# Patient Record
Sex: MALE
[De-identification: names, ages, dates, MRNs, and addresses within clinical notes are randomized per-mention and may not be internally consistent; named-entity substitution may affect disease eponyms.]

---

## 2017-04-28 ENCOUNTER — HOSPITAL ENCOUNTER (OUTPATIENT)
Dept: HOSPITAL 31 - C.ER | Age: 52
Setting detail: OBSERVATION
LOS: 2 days | Discharge: LEFT BEFORE DISCHARGE | End: 2017-04-30
Payer: MEDICARE

## 2017-04-28 VITALS — BODY MASS INDEX: 20.3 KG/M2

## 2017-04-28 DIAGNOSIS — E11.51: ICD-10-CM

## 2017-04-28 DIAGNOSIS — Z79.4: ICD-10-CM

## 2017-04-28 DIAGNOSIS — Z87.891: ICD-10-CM

## 2017-04-28 DIAGNOSIS — Z89.612: ICD-10-CM

## 2017-04-28 DIAGNOSIS — K50.90: ICD-10-CM

## 2017-04-28 DIAGNOSIS — R10.13: Primary | ICD-10-CM

## 2017-04-28 LAB
ALBUMIN/GLOB SERPL: 1.2 {RATIO} (ref 1–2.1)
ALP SERPL-CCNC: 92 U/L (ref 38–126)
ALT SERPL-CCNC: 14 U/L (ref 21–72)
AST SERPL-CCNC: 23 U/L (ref 17–59)
BASOPHILS # BLD AUTO: 0.1 K/UL (ref 0–0.2)
BASOPHILS NFR BLD: 1.1 % (ref 0–2)
BILIRUB SERPL-MCNC: 0.6 MG/DL (ref 0.2–1.3)
BILIRUB UR-MCNC: NEGATIVE MG/DL
BUN SERPL-MCNC: 45 MG/DL (ref 9–20)
CALCIUM SERPL-MCNC: 9.5 MG/DL (ref 8.6–10.4)
CHLORIDE SERPL-SCNC: 102 MMOL/L (ref 98–107)
CO2 SERPL-SCNC: 19 MMOL/L (ref 22–30)
EOSINOPHIL # BLD AUTO: 0.7 K/UL (ref 0–0.7)
EOSINOPHIL NFR BLD: 8.3 % (ref 0–4)
ERYTHROCYTE [DISTWIDTH] IN BLOOD BY AUTOMATED COUNT: 13.8 % (ref 11.5–14.5)
GLOBULIN SER-MCNC: 3.7 GM/DL (ref 2.2–3.9)
GLUCOSE SERPL-MCNC: 204 MG/DL (ref 75–110)
GLUCOSE UR STRIP-MCNC: (no result) MG/DL
HCT VFR BLD CALC: 29.5 % (ref 35–51)
HYALINE CASTS #/AREA URNS LPF: (no result) /LPF (ref 0–2)
KETONES UR STRIP-MCNC: (no result) MG/DL
LEUKOCYTE ESTERASE UR-ACNC: (no result) LEU/UL
LYMPHOCYTES # BLD AUTO: 2.3 K/UL (ref 1–4.3)
LYMPHOCYTES NFR BLD AUTO: 27.2 % (ref 20–40)
MCH RBC QN AUTO: 30 PG (ref 27–31)
MCHC RBC AUTO-ENTMCNC: 32.8 G/DL (ref 33–37)
MCV RBC AUTO: 91.4 FL (ref 80–94)
MONOCYTES # BLD: 0.4 K/UL (ref 0–0.8)
MONOCYTES NFR BLD: 5.2 % (ref 0–10)
NRBC BLD AUTO-RTO: 0 % (ref 0–2)
PH UR STRIP: 5 [PH] (ref 5–8)
PLATELET # BLD: 293 K/UL (ref 130–400)
PMV BLD AUTO: 8.2 FL (ref 7.2–11.7)
POTASSIUM SERPL-SCNC: 4.8 MMOL/L (ref 3.6–5.2)
PROT SERPL-MCNC: 8.1 G/DL (ref 6.3–8.3)
PROT UR STRIP-MCNC: (no result) MG/DL
RBC # UR STRIP: NEGATIVE /UL
RBC #/AREA URNS HPF: 1 /HPF (ref 0–3)
SODIUM SERPL-SCNC: 139 MMOL/L (ref 132–148)
SP GR UR STRIP: 1.02 (ref 1–1.03)
UROBILINOGEN UR-MCNC: NORMAL MG/DL (ref 0.2–1)
WBC # BLD AUTO: 8.5 K/UL (ref 4.8–10.8)
WBC #/AREA URNS HPF: 2 /HPF (ref 0–5)

## 2017-04-28 PROCEDURE — 82948 REAGENT STRIP/BLOOD GLUCOSE: CPT

## 2017-04-28 PROCEDURE — 96360 HYDRATION IV INFUSION INIT: CPT

## 2017-04-28 PROCEDURE — 76700 US EXAM ABDOM COMPLETE: CPT

## 2017-04-28 PROCEDURE — 85025 COMPLETE CBC W/AUTO DIFF WBC: CPT

## 2017-04-28 PROCEDURE — 83690 ASSAY OF LIPASE: CPT

## 2017-04-28 PROCEDURE — 81001 URINALYSIS AUTO W/SCOPE: CPT

## 2017-04-28 PROCEDURE — 80053 COMPREHEN METABOLIC PANEL: CPT

## 2017-04-28 PROCEDURE — 96374 THER/PROPH/DIAG INJ IV PUSH: CPT

## 2017-04-28 PROCEDURE — 36415 COLL VENOUS BLD VENIPUNCTURE: CPT

## 2017-04-28 PROCEDURE — 74176 CT ABD & PELVIS W/O CONTRAST: CPT

## 2017-04-28 PROCEDURE — 99285 EMERGENCY DEPT VISIT HI MDM: CPT

## 2017-04-28 NOTE — CT
PROCEDURE:  CT Abdomen and Pelvis without intravenous contrast



HISTORY:

cramping abdominal pain



COMPARISON:

None.



TECHNIQUE:

Unenhanced study.  Neither oral nor intravenous contrast 

administered. Sensitivity and specificity for acute inflammatory 

processes limited by the absence of oral and intravenous contrast. 



Radiation dose:



Total exam DLP = 535.58 mGy-cm.



This CT exam was performed using one or more of the following dose 

reduction techniques: Automated exposure control, adjustment of the 

mA and/or kV according to patient size, and/or use of iterative 

reconstruction technique.



FINDINGS:



LOWER THORAX:

Unremarkable. 



LIVER:

Unremarkable. No gross lesion or ductal dilatation.  



GALLBLADDER AND BILE DUCTS:

Unremarkable. 



PANCREAS:

Unremarkable. No gross lesion or ductal dilatation.



SPLEEN:

Unremarkable. 



ADRENALS:

Unremarkable. No mass. 



KIDNEYS AND URETERS:

Unremarkable. No hydronephrosis. No solid mass. 



VASCULATURE:

Unremarkable. No aortic aneurysm. 



BOWEL:

Unremarkable. No obstruction. No gross mural thickening. The 

Constipation without fecal impaction or obstruction. 



APPENDIX:

Unremarkable. Normal appendix. 



PERITONEUM:

Free fluid confined to the pelvis. 



LYMPH NODES:

Unremarkable. No enlarged lymph nodes. 



BLADDER:

Bladder wall thickening either reflective of lack of adequate 

distention or cystitis. No focal bladder abnormalities identified. 



REPRODUCTIVE:

Unremarkable. 



BONES:

No acute fracture. 



OTHER FINDINGS:

None.



IMPRESSION:

Diffuse bladder wall thickening.  Cystitis should be considered.



Trace fluid in the pelvis.

## 2017-04-28 NOTE — C.PDOC
History Of Present Illness





52 y/o male presents to ED with complaint of cramping occasional severe 

abdominal pain for 2 days. Referred by Dr. Brito. Denies history of biliary 

colic. Dr. Brito requests CT scan of abdomen. Denies fever, chills, nausea, 

vomiting, diarrhea, or other associated symptoms.  





Time Seen by Provider: 04/28/17 14:49


Chief Complaint (Nursing): Abdominal Pain


History Per: Patient


History/Exam Limitations: no limitations


Onset/Duration Of Symptoms: Days


Current Symptoms Are (Timing): Still Present


Location Of Pain/Discomfort: RLQ


Radiation Of Pain To:: None


Quality Of Discomfort: Cramping, "Pain"


Associated Symptoms: denies: Fever, Chills, Nausea, Vomiting, Diarrhea, Urinary 

Symptoms


Recent travel outside of the [place]: No





Past Medical History


Reviewed: Historical Data, Nursing Documentation, Vital Signs


Vital Signs: 


 Last Vital Signs











Temp  98.0 F   04/28/17 14:42


 


Pulse  90   04/28/17 14:42


 


Resp  18   04/28/17 14:42


 


BP  110/73   04/28/17 14:42


 


Pulse Ox  98   04/28/17 16:15














- Medical History


PMH: Crohn's Disease, Diabetes, Fractures, HTN





- [facility] Procedures








CLOSED RED-INT FIX FEMUR (06/24/14)


FLUOROSCOPY OF SUPERIOR VENA CAVA, GUIDANCE (10/26/16)


INSERTION OF INFUSION DEV INTO SUP VENA CAVA, PERC APPROACH (10/26/16)








Family History: States: Unknown Family Hx





- Social History


Hx Tobacco Use: Yes


Hx Alcohol Use: No


Hx Substance Use: No





- Immunization History


Hx Tetanus Toxoid Vaccination: No


Hx Influenza Vaccination: No


Hx Pneumococcal Vaccination: No





Review Of Systems


Except As Marked, All Systems Reviewed And Found Negative.


Constitutional: Negative for: Fever, Chills


Cardiovascular: Negative for: Chest Pain


Respiratory: Negative for: Cough


Gastrointestinal: Positive for: Abdominal Pain.  Negative for: Nausea, Vomiting


Skin: Negative for: Rash





Physical Exam





- Physical Exam


Appears: Non-toxic, No Acute Distress


Skin: Warm, Dry


Head: Atraumatic, Normacephalic


Oral Mucosa: Moist


Chest: Symmetrical


Cardiovascular: Rhythm Regular


Respiratory: No Rales, No Rhonchi, No Wheezing


Gastrointestinal/Abdominal: Soft, No Tenderness, Other (dull to percussion)


Back: Normal Inspection, No CVA Tenderness


Extremity: Capillary Refill (< 2 sec. ), Other (Left BKA )


Neurological/Psych: Oriented x3, Normal Speech, Normal Cognition





ED Course And Treatment





- Laboratory Results


Result Diagrams: 


 04/28/17 15:11





 04/28/17 15:11


O2 Sat by Pulse Oximetry: 98 (RA)


Pulse Ox Interpretation: Normal


Progress Note: Treated with Toradol and IVFs. Labs and CT abdomen/pelvis 

ordered.


Reevaluation Time: 17:14


Reassessment Condition: Improved





- Physician Consult Information


Outcome Of Conversation: 1700, 1710; d/w Dr. Brito, ok to Obs





Medical Decision Making


Medical Decision Making: 





mild pancreatitis, lipase 620, normal CT abd





Disposition


Doctor Will See Patient In The: Hospital


Counseled Patient/Family Regarding: Studies Performed, Diagnosis





- Disposition


Disposition: HOSPITALIZED


Disposition Time: 17:15


Condition: GOOD





- Clinical Impression


Clinical Impression: 


 Pancreatitis








- Scribe Statement


The provider has reviewed the documentation as recorded by the Suze Lloyd 





Provider Scribe Attestation:


All medical record entries made by the Edyibkyara were at my direction and 

personally dictated by me. I have reviewed the chart and agree that the record 

accurately reflects my personal performance of the history, physical exam, 

medical decision making, and the department course for this patient. I have 

also personally directed, reviewed, and agree with the discharge instructions 

and disposition.

## 2017-04-29 VITALS — OXYGEN SATURATION: 98 % | RESPIRATION RATE: 20 BRPM

## 2017-04-29 LAB
ALBUMIN/GLOB SERPL: 1.1 {RATIO} (ref 1–2.1)
ALP SERPL-CCNC: 81 U/L (ref 38–126)
ALT SERPL-CCNC: 24 U/L (ref 21–72)
AST SERPL-CCNC: 25 U/L (ref 17–59)
BASOPHILS # BLD AUTO: 0.1 K/UL (ref 0–0.2)
BASOPHILS NFR BLD: 1.3 % (ref 0–2)
BILIRUB SERPL-MCNC: 0.6 MG/DL (ref 0.2–1.3)
BUN SERPL-MCNC: 38 MG/DL (ref 9–20)
CALCIUM SERPL-MCNC: 8.6 MG/DL (ref 8.6–10.4)
CHLORIDE SERPL-SCNC: 108 MMOL/L (ref 98–107)
CO2 SERPL-SCNC: 20 MMOL/L (ref 22–30)
EOSINOPHIL # BLD AUTO: 0.6 K/UL (ref 0–0.7)
EOSINOPHIL NFR BLD: 10.8 % (ref 0–4)
ERYTHROCYTE [DISTWIDTH] IN BLOOD BY AUTOMATED COUNT: 13.6 % (ref 11.5–14.5)
GLOBULIN SER-MCNC: 3.2 GM/DL (ref 2.2–3.9)
GLUCOSE SERPL-MCNC: 158 MG/DL (ref 75–110)
HCT VFR BLD CALC: 28.3 % (ref 35–51)
LYMPHOCYTES # BLD AUTO: 2 K/UL (ref 1–4.3)
LYMPHOCYTES NFR BLD AUTO: 35.9 % (ref 20–40)
MCH RBC QN AUTO: 30.4 PG (ref 27–31)
MCHC RBC AUTO-ENTMCNC: 32.6 G/DL (ref 33–37)
MCV RBC AUTO: 93 FL (ref 80–94)
MONOCYTES # BLD: 0.3 K/UL (ref 0–0.8)
MONOCYTES NFR BLD: 5.5 % (ref 0–10)
NRBC BLD AUTO-RTO: 0 % (ref 0–2)
PLATELET # BLD: 233 K/UL (ref 130–400)
PMV BLD AUTO: 8.4 FL (ref 7.2–11.7)
POTASSIUM SERPL-SCNC: 5.2 MMOL/L (ref 3.6–5.2)
PROT SERPL-MCNC: 6.8 G/DL (ref 6.3–8.3)
SODIUM SERPL-SCNC: 140 MMOL/L (ref 132–148)
WBC # BLD AUTO: 5.5 K/UL (ref 4.8–10.8)

## 2017-04-29 RX ADMIN — INSULIN ASPART SCH: 100 INJECTION, SOLUTION INTRAVENOUS; SUBCUTANEOUS at 08:04

## 2017-04-29 RX ADMIN — INSULIN ASPART SCH UNIT: 100 INJECTION, SOLUTION INTRAVENOUS; SUBCUTANEOUS at 13:34

## 2017-04-29 RX ADMIN — PANTOPRAZOLE SODIUM SCH MG: 40 TABLET, DELAYED RELEASE ORAL at 13:34

## 2017-04-29 RX ADMIN — INSULIN ASPART SCH UNIT: 100 INJECTION, SOLUTION INTRAVENOUS; SUBCUTANEOUS at 22:07

## 2017-04-29 RX ADMIN — INSULIN ASPART SCH UNIT: 100 INJECTION, SOLUTION INTRAVENOUS; SUBCUTANEOUS at 16:19

## 2017-04-29 NOTE — US
HISTORY:

abdominal pain



COMPARISON:

CT abdomen and pelvis without oral or IV contrast performed 4/28/17



TECHNIQUE:

Sonographic evaluation of the abdomen.



FINDINGS:

Examination limited by excessive bowel gas. 



LIVER:

Measures 17.9 cm in sagittal dimension and appears otherwise grossly 

unremarkable. No focal hepatic mass identified. The main portal vein 

appears patent with normal directional flow.   No intrahepatic bile 

duct dilatation.



GALLBLADDER:

No gallstones. No gallbladder wall thickening. Negative sonographic 

Núñez's sign as assessed by the sonographer.



COMMON BILE DUCT:

Measures 6 mm. 



PANCREAS:

Not well visualized.



RIGHT KIDNEY:

Measures 8.4 x 3.8 x 3.5 cm. 1.7 x 1.5 x 2.2 cm hypodensity with 

central 0.8 x 0.4 x 1.1 cm echogenicity; this is not evident on CT 

performed 4/28/17 . No hydronephrosis. 



LEFT KIDNEY:

Measures 10.2 x 5.2 x 4.2 cm. No obstructing calculus or 

hydronephrosis identified. 



SPLEEN:

Measures approximately 9.9 cm. 



AORTA:

Limited views appear unremarkable. 



IVC:

Limited views appear unremarkable. 



OTHER FINDINGS:

None. 



IMPRESSION:

Right-sided 1.7 x 1.5 x 2.2 cm hypo echogenicity with central 0.8 x 

0.4 x 1.1 cm echogenic focus ; this is not evident on CT performed 

4/28/17 and is of dubious significance. Dedicated repeat renal 

ultrasound suggested as this may be artifactual.

## 2017-04-29 NOTE — CP.PCM.CON
Addendum entered and electronically signed by Maye Gutierrez DO  04/29/ 17 11:11: 





colonoscopy over ten years ago- endorsed to be normal





Original Note:








<Maye Gutierrez - Last Filed: 04/29/17 11:06>





History of Present Illness





- History of Present Illness


History of Present Illness: 


Gastroenterology Fellow/PGY4 Consult Note





51 year old male with history of Hypertension, Diabetes on Humalog and Toujeo, 

CKD Stage 3, and left AKA secondary to diabetes complication presenting with 

abdominal pain. Patient describes progressive epigastric discomfort without 

radiation to back for the last two days. Denies nausea, vomiting, diarrhea, 

constipation, heartburn, indigestion, bloating, melena, hematochezia, 

hematemesis, or unintentional weight loss. Denies NSAID use or alcohol use. No 

recent travel, antibiotics, or sick contacts. Notes remote history of 

intermittent abdominal pain throughout adult life. States he was told he may 

have Crohn's disease but never required therapy. Prior colonoscopy over ten 

years ago endorsed to be normal. No prior EGD.





Family-denies liver cancer, colon cancer, stomach cancer


Social-denies tobacco, alcohol, illicit drug use


Surgery- left AKA, , B/L LE vein stripping, multiple stitches





Review of Systems





- Review of Systems


Review of Systems: 


A 12-point review of systems negative except for as above





Past Patient History





- Past Medical History & Family History


Past Medical History?: Yes





- Past Social History


Smoking Status: Former Smoker





- CARDIAC


Hx Hypertension: Yes





- PULMONARY


Hx Respiratory Disorders: No


Other/Comment: QUIT SMOKING X 7 MONTH AGO





- NEUROLOGICAL


Hx Neurological Disorder: No


Other/Comment: AAOX3





- HEENT


Hx HEENT Problems: No


Other/Comment: WEARS GLASSES





- RENAL


Hx Chronic Kidney Disease: No





- ENDOCRINE/METABOLIC


Hx Diabetes Mellitus Type 1: Yes





- HEMATOLOGICAL/ONCOLOGICAL


Hx Blood Disorders: Yes


Hx Blood Transfusions: Yes


Hx Blood Transfusion Reaction: No





- INTEGUMENTARY


Hx Dermatological Problems: No





- MUSCULOSKELETAL/RHEUMATOLOGICAL


Hx Falls: Yes (last fall 04/26/2017)


Hx Fractures: Yes (Broke hip 2014 or 2015)





- GASTROINTESTINAL


Hx Crohn's Disease: Yes





- GENITOURINARY/GYNECOLOGICAL


Hx Genitourinary Disorders: No





- PSYCHIATRIC


Hx Depression: Yes


Hx Substance Use: No





- SURGICAL HISTORY


Hx Surgeries: Yes


Hx Amputation: Yes (left BKA)





- ANESTHESIA


Hx Anesthesia: Yes


Hx Anesthesia Reactions: No


Hx Malignant Hyperthermia: No


Has any member of the family had a problem w/ anesthesia?: No





Meds


Allergies/Adverse Reactions: 


 Allergies











Allergy/AdvReac Type Severity Reaction Status Date / Time


 


acetaminophen [From Percocet] Allergy   Verified 10/26/16 10:52


 


oxycodone HCl [From Percocet] Allergy   Verified 10/26/16 10:52


 


Penicillins Allergy   Verified 10/26/16 10:52














- Medications


Medications: 


 Current Medications





Sodium Chloride (Sodium Chloride 0.9%)  1,000 mls @ 100 mls/hr IV .Q10H [facility]


   Last Admin: 04/29/17 04:13 Dose:  100 mls/hr


Insulin Aspart (Novolog)  0 unit SC ACHS [facility]


   PRN Reason: Protocol


   Last Admin: 04/29/17 08:04 Dose:  Not Given


Morphine Sulfate (Morphine)  2 mg IVP Q6 PRN


   PRN Reason: Pain, moderate (4-7)


   Last Admin: 04/29/17 08:18 Dose:  2 mg











Physical Exam





- Constitutional


Appears: Non-toxic, No Acute Distress





- Head Exam


Head Exam: ATRAUMATIC, NORMOCEPHALIC





- Eye Exam


Eye Exam: EOMI, PERRL





- ENT Exam


ENT Exam: Mucous Membranes Moist, Normal Oropharynx





- Neck Exam


Neck exam: Positive for: Full Rom, Normal Inspection





- Respiratory Exam


Respiratory Exam: Clear to Auscultation Bilateral.  absent: Rales, Rhonchi, 

Wheezes





- Cardiovascular Exam


Cardiovascular Exam: RRR, +S1, +S2.  absent: Gallop, Rubs





- GI/Abdominal Exam


GI & Abdominal Exam: Normal Bowel Sounds, Soft, Tenderness.  absent: Distended, 

Firm, Guarding, Organomegaly, Rebound, Rigid


Additional comments: 


epigastric tenderness to palpation 








- Extremities Exam


Extremities exam: Positive for: full ROM.  Negative for: pedal edema





- Neurological Exam


Neurological exam: Alert





- Psychiatric Exam


Psychiatric exam: Normal Affect, Normal Mood





- Skin


Skin Exam: Dry, Intact, Normal Color, Warm





Results





- Vital Signs


Recent Vital Signs: 


 Last Vital Signs











Temp  97.4 F L  04/29/17 07:44


 


Pulse  66   04/29/17 07:44


 


Resp  20   04/29/17 07:44


 


BP  113/69   04/29/17 07:44


 


Pulse Ox  98   04/29/17 07:44














- Labs


Result Diagrams: 


 04/28/17 15:11





 04/28/17 15:11


Labs: 


 Laboratory Results - last 24 hr











  04/29/17





  07:02


 


POC Glucose (mg/dL)  152 H














Assessment & Plan





- Assessment and Plan (Free Text)


Assessment: 


51 year old male with history of Hypertension, Diabetes on Humalog and Toujeo, 

and left AKA secondary to diabetes complication presenting with epigastric 

pain. CT A/P without acute pancreatic findings and lipase 620.  No prior EGD or 

colonoscopy.





Plan: 





>DDX: acute mild pancreatitis, Gastritis, cholelithiasis


>aggressive IVFs-bolus 1L LR


>maintenance /hr


>ordered CBC, CMP


>ordered Lipid panel


>ordered IgG4  to evaluate for autoimmune pancreatitis


>denies alcohol use, no signs of inciting drugs


>monitor for improvement in BUN/Cr


>pending Ultrasound


>evaluate for gallstone pancreatitis


>continue clear liquids


>supportive care: pain control, antiemetics


>further recommendations based on clinical course





<Mercedes Frankel - Last Filed: 04/29/17 11:29>





Meds





- Medications


Medications: 


 Current Medications





Sodium Chloride (Sodium Chloride 0.9%)  1,000 mls @ 100 mls/hr IV .Q10H [facility]


   Last Admin: 04/29/17 04:13 Dose:  100 mls/hr


Insulin Aspart (Novolog)  0 unit SC ACHS [facility]


   PRN Reason: Protocol


   Last Admin: 04/29/17 08:04 Dose:  Not Given


Morphine Sulfate (Morphine)  2 mg IVP Q6 PRN


   PRN Reason: Pain, moderate (4-7)


   Last Admin: 04/29/17 08:18 Dose:  2 mg











Results





- Vital Signs


Recent Vital Signs: 


 Last Vital Signs











Temp  97.4 F L  04/29/17 07:44


 


Pulse  66   04/29/17 07:44


 


Resp  20   04/29/17 07:44


 


BP  113/69   04/29/17 07:44


 


Pulse Ox  98   04/29/17 07:44














- Labs


Result Diagrams: 


 04/28/17 15:11





 04/28/17 15:11


Labs: 


 Laboratory Results - last 24 hr











  04/29/17





  07:02


 


POC Glucose (mg/dL)  152 H














Attending/Attestation





- Attestation


I have personally seen and examined this patient.: Yes


I have fully participated in the care of the patient.: Yes


I have reviewed all pertinent clinical information: Yes


Notes (Text): 


Patient seen and examined with GI fellow.  Agree with her note as documented 

above with the following additions/exceptions.  This is a 51 year old male with 

history of HTN, insulin dependent diabetes, s/p L AKA who is admitted with 

sudden onset abdominal pain.  He is found to have mildly elevated lipase with 

normal CT, consistent with chemical pancreatitis.  There is no associated nausea

/vomiting or diarrhea.He denies ETOH use.  He reports being told he may have 

Crohn's disease in past due to food intolerances, although never formally 

diagnosed and has never been on therapy.  He states that his abdominal pain is 

overall improved today.  Recommend continued supportive care, IVF hydration, 

pain control as needed.  Follow up repeat CMP.  Add PPI therapy.  Abdominal 

ultrasound pending.  Would advance diet slowly as tolerated.  He would benefit 

from outpatient colonoscopy for screening purposes.





04/29/17 11:21

## 2017-04-30 VITALS — TEMPERATURE: 97.9 F | DIASTOLIC BLOOD PRESSURE: 77 MMHG | SYSTOLIC BLOOD PRESSURE: 135 MMHG | HEART RATE: 73 BPM

## 2017-04-30 RX ADMIN — INSULIN ASPART SCH: 100 INJECTION, SOLUTION INTRAVENOUS; SUBCUTANEOUS at 11:40

## 2017-04-30 RX ADMIN — INSULIN ASPART SCH UNIT: 100 INJECTION, SOLUTION INTRAVENOUS; SUBCUTANEOUS at 08:35

## 2017-04-30 RX ADMIN — PANTOPRAZOLE SODIUM SCH MG: 40 TABLET, DELAYED RELEASE ORAL at 10:33

## 2017-04-30 NOTE — CP.PCM.PN
<Maye Gutierrez - Last Filed: 04/30/17 08:55>





Subjective





- Date & Time of Evaluation


Date of Evaluation: 04/30/17


Time of Evaluation: 07:12





- Subjective


Subjective: 


Gastroenterology Fellow/PGY4 Progress Note





Patient notes improved abdominal pain. Tolerating regular diet. Notes two bowel 

movements since yesterday morning. A 12-point review of systems negative except 

for as above.











Objective





- Vital Signs/Intake and Output


Vital Signs (last 24 hours): 


 











Temp Pulse Resp BP Pulse Ox


 


 97.8 F   78   20   135/80   98 


 


 04/29/17 23:40  04/29/17 23:40  04/29/17 23:40  04/29/17 23:40  04/29/17 23:40








Intake and Output: 


 











 04/30/17 04/30/17





 06:59 18:59


 


Intake Total 790 


 


Output Total 200 


 


Balance 590 














- Medications


Medications: 


 Current Medications





Sodium Chloride (Sodium Chloride 0.9%)  1,000 mls @ 100 mls/hr IV .Q10H [facility]


   Last Admin: 04/30/17 06:06 Dose:  Not Given


Insulin Aspart (Novolog)  0 unit SC ACHS [facility]


   PRN Reason: Protocol


   Last Admin: 04/29/17 22:07 Dose:  2 unit


Morphine Sulfate (Morphine)  2 mg IVP Q6 PRN


   PRN Reason: Pain, moderate (4-7)


   Last Admin: 04/29/17 08:18 Dose:  2 mg


Pantoprazole Sodium (Protonix Ec Tab)  40 mg PO DAILY [facility]


   Last Admin: 04/29/17 13:34 Dose:  40 mg











- Labs


Labs: 


 





 04/29/17 11:55 





 04/29/17 11:55 











- Constitutional


Appears: Non-toxic, No Acute Distress





- Head Exam


Head Exam: ATRAUMATIC, NORMOCEPHALIC





- Eye Exam


Eye Exam: EOMI, PERRL


Pupil Exam: PERRL.  absent: Miosis, Mydriatic





- ENT Exam


ENT Exam: Mucous Membranes Moist, Normal Oropharynx





- Neck Exam


Neck Exam: Full ROM, Normal Inspection





- Respiratory Exam


Respiratory Exam: Clear to Ausculation Bilateral.  absent: Rales, Rhonchi, 

Wheezes





- Cardiovascular Exam


Cardiovascular Exam: RRR, +S1, +S2.  absent: Gallop, Rubs





- GI/Abdominal Exam


GI & Abdominal Exam: Soft, Normal Bowel Sounds.  absent: Distended, Firm, 

Guarding, Rigid, Tenderness, Organomegaly, Rebound





- Extremities Exam


Extremities Exam: Full ROM.  absent: Pedal Edema





- Neurological Exam


Neurological Exam: Alert, Awake





- Psychiatric Exam


Psychiatric exam: Normal Affect, Normal Mood





- Skin


Skin Exam: Dry, Intact, Normal Color, Warm





Assessment and Plan





- Assessment and Plan (Free Text)


Assessment: 


51 year old male with history of Hypertension, Diabetes on Humalog and Toujeo, 

and left AKA presenting with epigastric pain. CT A/P without acute pancreatic 

changes and lipase 620.  No prior EGD or colonoscopy.








Plan: 





>likely chemical pancreatitis, Gastritis


>Ultrasound- no gallstones


>symptoms resolved


>regular diet


>clear for discharge from GI standpoint


>outpatient follow up with Dr. Frankel for elective colonoscopy for CRC 

screening





<Mercedes Frankel - Last Filed: 04/30/17 12:42>





Objective





- Vital Signs/Intake and Output


Vital Signs (last 24 hours): 


 











Temp Pulse Resp BP Pulse Ox


 


 97.9 F   73   20   135/77   98 


 


 04/30/17 08:08  04/30/17 08:08  04/30/17 08:08  04/30/17 08:08  04/30/17 08:08








Intake and Output: 


 











 04/30/17 04/30/17





 06:59 18:59


 


Intake Total 790 


 


Output Total 200 


 


Balance 590 














- Medications


Medications: 


 Current Medications





Sodium Chloride (Sodium Chloride 0.9%)  1,000 mls @ 100 mls/hr IV .Q10H [facility]


   Last Admin: 04/30/17 06:06 Dose:  Not Given


Insulin Aspart (Novolog)  0 unit SC ACHS [facility]


   PRN Reason: Protocol


   Last Admin: 04/30/17 11:40 Dose:  Not Given


Morphine Sulfate (Morphine)  2 mg IVP Q6 PRN


   PRN Reason: Pain, moderate (4-7)


   Last Admin: 04/29/17 08:18 Dose:  2 mg


Pantoprazole Sodium (Protonix Ec Tab)  40 mg PO DAILY [facility]


   Last Admin: 04/30/17 10:33 Dose:  40 mg











- Labs


Labs: 


 





 04/29/17 11:55 





 04/29/17 11:55 











Attending/Attestation





- Attestation


I have personally seen and examined this patient.: Yes


I have fully participated in the care of the patient.: Yes


I have reviewed all pertinent clinical information, including history, physical 

exam and plan: Yes


Notes (Text): 


Patient seen and examined with GI fellow.  Agree with her note as documented 

above with the following additions/exceptions.  





This is a 51 year old male with history of HTN, insulin dependent diabetes, s/p 

L AKA who is admitted with sudden onset abdominal pain.  He is found to have 

mildly elevated lipase with normal CT, consistent with chemical pancreatitis.  

Abd sonogram without evidence of hepatobiliary pathology.  He symptoms are 

resolved, no further abdominal pain and he is tolerating PO without difficulty.

  No further inpatient GI work up at this time.  He can follow up with me as 

outpatient (will need colonoscopy for CRC screening).  Office information was 

provided to the patient.  Please call with any further questions or concerns.





04/30/17 12:41

## 2017-05-01 NOTE — DS
REASON FOR ADMISSION:  This is a 51-year-old  male with history of 
multiple medical problems, was admitted for abdominal pain and found to have 
pancreatitis.



COURSE OF HOSPITALIZATION:  The patient was admitted to medical floor and 
thought to have alcohol-related pancreatitis.  The patient's abdominal 
ultrasound did not show any cholelithiasis. The patient was kept on IV steroid 
and initially n.p.o.  The patient had a GI consultation.  The patient's 
ultrasound showed that there is grossly unremarkable liver and gallbladder.  
The patient's diet was advanced and patient was tolerating well, but he signed 
against medical advice to continue hydration and monitor the patient on diet.



FINAL DIAGNOSES:

1.  Acute pancreatitis, alcohol-related

2.  Peripheral vascular disease with left below knee amputation.

3.  Hypertension.





__________________________________________

[facility] JOSH Brito MD







cc:   



DD: 05/01/2017 21:00:24  [id number]

TT: 05/01/2017 22:43:29

Job # [id number]

[email or web address] HUNT

## 2017-05-22 ENCOUNTER — HOSPITAL ENCOUNTER (OUTPATIENT)
Dept: HOSPITAL 31 - C.ENDO | Age: 52
Discharge: HOME | End: 2017-05-22
Attending: INTERNAL MEDICINE
Payer: MEDICARE

## 2017-05-22 VITALS — OXYGEN SATURATION: 97 %

## 2017-05-22 VITALS — DIASTOLIC BLOOD PRESSURE: 75 MMHG | SYSTOLIC BLOOD PRESSURE: 122 MMHG | HEART RATE: 72 BPM

## 2017-05-22 VITALS — RESPIRATION RATE: 12 BRPM

## 2017-05-22 VITALS — TEMPERATURE: 98.6 F

## 2017-05-22 VITALS — BODY MASS INDEX: 20.7 KG/M2

## 2017-05-22 DIAGNOSIS — K20.8: Primary | ICD-10-CM

## 2017-05-22 DIAGNOSIS — K29.80: ICD-10-CM

## 2017-05-22 DIAGNOSIS — K64.8: ICD-10-CM

## 2017-05-22 DIAGNOSIS — Z12.11: ICD-10-CM

## 2017-05-22 DIAGNOSIS — K29.50: ICD-10-CM

## 2017-05-22 DIAGNOSIS — R10.84: ICD-10-CM

## 2017-05-22 PROCEDURE — 88313 SPECIAL STAINS GROUP 2: CPT

## 2017-05-22 PROCEDURE — 88342 IMHCHEM/IMCYTCHM 1ST ANTB: CPT

## 2017-05-22 PROCEDURE — 82948 REAGENT STRIP/BLOOD GLUCOSE: CPT

## 2017-05-22 PROCEDURE — 43239 EGD BIOPSY SINGLE/MULTIPLE: CPT

## 2017-05-22 PROCEDURE — 88305 TISSUE EXAM BY PATHOLOGIST: CPT

## 2017-05-22 PROCEDURE — 45378 DIAGNOSTIC COLONOSCOPY: CPT

## 2017-07-24 ENCOUNTER — HOSPITAL ENCOUNTER (EMERGENCY)
Dept: HOSPITAL 31 - C.ER | Age: 52
Discharge: HOME | End: 2017-07-24
Payer: MEDICARE

## 2017-07-24 VITALS — HEART RATE: 76 BPM | RESPIRATION RATE: 16 BRPM | SYSTOLIC BLOOD PRESSURE: 100 MMHG | DIASTOLIC BLOOD PRESSURE: 68 MMHG

## 2017-07-24 VITALS — BODY MASS INDEX: 20.7 KG/M2

## 2017-07-24 VITALS — TEMPERATURE: 98 F

## 2017-07-24 VITALS — OXYGEN SATURATION: 100 %

## 2017-07-24 DIAGNOSIS — M79.605: Primary | ICD-10-CM
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
## 2017-07-24 NOTE — RAD
Left knee three views 



History: Pain. Below-knee amputation. 



Comparison: None available. 



Findings: 



No evidence of acute displaced fracture or dislocation. 



Mild productive change and or heterotopic bone at the surgical sites 

of the proximal tibia and fibula. 



Impression: 



No evidence of acute displaced fracture or dislocation. 



Mild productive change and or heterotopic bone at the surgical sites 

of the proximal tibia and fibula. 



If pain persists, consider MRI.

## 2017-08-25 ENCOUNTER — HOSPITAL ENCOUNTER (INPATIENT)
Dept: HOSPITAL 31 - C.ER | Age: 52
LOS: 4 days | Discharge: HOME | DRG: 638 | End: 2017-08-29
Payer: MEDICARE

## 2017-08-25 VITALS — BODY MASS INDEX: 21.4 KG/M2

## 2017-08-25 DIAGNOSIS — E10.21: ICD-10-CM

## 2017-08-25 DIAGNOSIS — E10.69: Primary | ICD-10-CM

## 2017-08-25 DIAGNOSIS — I70.209: ICD-10-CM

## 2017-08-25 DIAGNOSIS — Z87.891: ICD-10-CM

## 2017-08-25 DIAGNOSIS — M86.9: ICD-10-CM

## 2017-08-25 DIAGNOSIS — F32.9: ICD-10-CM

## 2017-08-25 DIAGNOSIS — N18.3: ICD-10-CM

## 2017-08-25 DIAGNOSIS — L03.031: ICD-10-CM

## 2017-08-25 DIAGNOSIS — L97.519: ICD-10-CM

## 2017-08-25 DIAGNOSIS — K50.90: ICD-10-CM

## 2017-08-25 DIAGNOSIS — E10.22: ICD-10-CM

## 2017-08-25 DIAGNOSIS — E10.621: ICD-10-CM

## 2017-08-25 DIAGNOSIS — Z79.4: ICD-10-CM

## 2017-08-25 DIAGNOSIS — I12.9: ICD-10-CM

## 2017-08-25 LAB
BUN SERPL-MCNC: 36 MG/DL (ref 9–20)
CALCIUM SERPL-MCNC: 9 MG/DL (ref 8.6–10.4)
CHLORIDE SERPL-SCNC: 95 MMOL/L (ref 98–107)
CO2 SERPL-SCNC: 22 MMOL/L (ref 22–30)
GLUCOSE SERPL-MCNC: 582 MG/DL (ref 75–110)
POTASSIUM SERPL-SCNC: 4.9 MMOL/L (ref 3.6–5.2)
SODIUM SERPL-SCNC: 129 MMOL/L (ref 132–148)

## 2017-08-25 RX ADMIN — INSULIN ASPART SCH UNIT: 100 INJECTION, SOLUTION INTRAVENOUS; SUBCUTANEOUS at 22:00

## 2017-08-25 NOTE — RAD
PROCEDURE:  CHEST RADIOGRAPH, 1 VIEW



HISTORY:

admission, PICC confirmation



COMPARISON:

08/25/2017 at 10:45 a.m.



FINDINGS:

The right PICC line terminates at the cavoatrial junction. 



LUNGS:

The lungs are well inflated and clear.



PLEURA:

No pneumothorax or pleural fluid seen.



CARDIOVASCULAR:

Normal.



OSSEOUS STRUCTURES:

No significant abnormalities.



VISUALIZED UPPER ABDOMEN:

Normal.



OTHER FINDINGS:

None. 



IMPRESSION:

Right PICC line terminates at the cavoatrial junction. No acute 

findings.

## 2017-08-25 NOTE — CP.PCM.CON
History of Present Illness





- History of Present Illness


History of Present Illness: 





iv cubicin ordered empirically


hx dm   left bka  here for right foot infection


allergic to pcn





Review of Systems





- Constitutional


Constitutional: As Per HPI





- EENT


Eyes: absent: As Per HPI, Blind Spots, Blurred Vision, Change in Vision, 

Decreased Night Vision, Diplopia, Discharge, Dry Eye, Exophthalmos, Floaters, 

Irritation, Itchy Eyes, Loss of Peripheral Vision, Pain, Photophobia, Requires 

Corrective Lenses, Sees Flashes, Spots in Vision, Tunnel Vision, Other Visual 

Disturbances, Loss of Vision, Other


Ears: absent: As Per HPI, Decreased Hearing, Ear Discharge, Ear Pain, Tinnitus, 

Abnormal Hearing, Disequilibrium, Dizziness, Other


Nose/Mouth/Throat: absent: As Per HPI, Epistaxis, Nasal Congestion, Nasal 

Discharge, Nasal Obstruction, Nasal Trauma, Nose Pain, Post Nasal Drip, Sinus 

Pain, Sinus Pressure, Bleeding Gums, Change in Voice, Dental Pain, Dry Mouth, 

Dysphagia, Halitosis, Hoarsness, Lip Swelling, Mouth Lesions, Mouth Pain, 

Odynophagia, Sore Throat, Throat Swelling, Tongue Swelling, Facial Pain, Neck 

Pain, Neck Mass, Other





- Cardiovascular


Cardiovascular: absent: As Per HPI, Acrocyanosis, Chest Pain, Chest Pain at Rest

, Chest Pain with Activity, Claudication, Diaphoresis, Dyspnea, Dyspnea on 

Exertion, Edema, Irregular Heart Rhythm, Pain Radiating to Arm/Neck/Jaw, Leg 

Edema, Leg Ulcers, Lightheadedness, Orthopnea, Palpitations, Paroxysmal 

Nocturnal Dyspnea, Pedal Edema, Radiating Pain, Rapid Heart Rate, Slow Heart 

Rate, Syncope, Other





- Respiratory


Respiratory: absent: As Per HPI, Cough, Dyspnea, Hemoptysis, Dyspnea on Exertion

, Wheezing, Snoring, Stridor, Pain on Inspiration, Chest Congestion, Excessive 

Mucous Production, Change in Mucous Color, Pain with Coughing, Other





- Gastrointestinal


Gastrointestinal: absent: As Per HPI, Abdominal Pain, Belching, Bloating, 

Change in Bowel Habits, Change in Stool Character, Coffee Ground Emesis, 

Constipation, Cramping, Diarrhea, Dyspepsia, Dysphagia, Early Satiety, 

Excessive Flatus, Fecal Incontinence, Heartburn, Hematemesis, Hematochezia, 

Loose Stools, Melena, Nausea, Odynophagia, Temesmus, Vomiting, Other





- Genitourinary


Genitourinary: absent: As Per HPI, Change in Urinary Stream, Difficulty 

Urinating, Dysuria, Flank Pain, Hematuria, Pyuria, Nocturia, Urinary 

Incontinence, Urinary Frequency, Urinary Hesitance, Urinary Urgency, Voiding 

Freq/Small Amts, Freq UTI, Hx Renal/Bladder Calculi, Hx /Renal Surgery, 

Bladder Distension, Other





- Musculoskeletal


Musculoskeletal: As Per HPI





- Integumentary


Integumentary: As Per HPI





Past Patient History





- Past Medical History & Family History


Past Medical History?: Yes





- Past Social History


Smoking Status: Former Smoker





- CARDIAC


Hx Hypertension: No (PT DENIES)





- PULMONARY


Hx Respiratory Disorders: No





- NEUROLOGICAL


Hx Neurological Disorder: No





- HEENT


Hx HEENT Problems: No





- RENAL


Hx Chronic Kidney Disease: No





- ENDOCRINE/METABOLIC


Hx Endocrine Disorders: Yes


Hx Diabetes Mellitus Type 2: Yes





- HEMATOLOGICAL/ONCOLOGICAL


Hx Anemia: Yes





- INTEGUMENTARY


Hx Dermatological Problems: No





- MUSCULOSKELETAL/RHEUMATOLOGICAL


Hx Fractures: Yes (Broke hip 2014 or 2015)





- GASTROINTESTINAL


Hx Crohn's Disease: Yes





- GENITOURINARY/GYNECOLOGICAL


Hx Genitourinary Disorders: No





- PSYCHIATRIC


Hx Depression: Yes


Hx Substance Use: No





- SURGICAL HISTORY


Hx Surgeries: Yes


Hx Amputation: Yes (LEFT AKA)


Hx Orthopedic Surgery: Yes (LEFT FOOT X 6-8)


Other/Comment: BILATERAL VEIN STRIPPING AND LIGATION





- ANESTHESIA


Hx Anesthesia: Yes


Hx Anesthesia Reactions: No


Hx Malignant Hyperthermia: No





Meds


Home Medications: 


 Home Medication List











 Medication  Instructions  Recorded  Confirmed  Type


 


DAPTOmycin [Cubicin] 340 mg IV Q24H #42 vial 08/29/17  Rx


 


Pantoprazole [Protonix] 40 mg PO DAILY #30 ect 08/29/17  Rx











Allergies/Adverse Reactions: 


 Allergies











Allergy/AdvReac Type Severity Reaction Status Date / Time


 


acetaminophen [From Percocet] Allergy   Verified 08/25/17 16:53


 


oxycodone HCl [From Percocet] Allergy   Verified 08/25/17 16:53


 


Penicillins Allergy   Verified 08/25/17 16:53














- Medications


Medications: 


 Current Medications





Aspirin (Aspirin Chewable)  81 mg PO DAILY [facility]


Daptomycin (Cubicin)  340 mg 4 mg/kg (340 mg) IV Q24H [facility]


   Stop: 08/30/17 19:46


Enalapril Maleate (Vasotec)  2.5 mg PO DAILY [facility]


Gabapentin (Neurontin)  100 mg PO DAILY [facility]


Home Med (L.Acidoph,Paracasei, B.Lactis [Probiotic])  1 each PO DAILY [facility]


Home Med (Multivitamin [Multivitamins])  1 each PO DAILY [facility]


Insulin Aspart (Novolog)  0 unit SC ACHS [facility]


   PRN Reason: Protocol


Insulin Glargine (Lantus)  16 unit SC BIDAC [facility]


Pantoprazole Sodium (Protonix Ec Tab)  40 mg PO DAILY [facility]


Sertraline HCl (Zoloft)  100 mg PO DAILY [facility]











Physical Exam





- Constitutional


Appears: Non-toxic, Chronically Ill





- Head Exam


Head Exam: NORMOCEPHALIC





- Eye Exam


Eye Exam: PERRL





- ENT Exam


ENT Exam: Mucous Membranes Dry, Normal External Ear Exam





- Neck Exam


Neck exam: Negative for: Lymphadenopathy





- Respiratory Exam


Respiratory Exam: Decreased Breath Sounds





- Cardiovascular Exam


Cardiovascular Exam: REGULAR RHYTHM





- GI/Abdominal Exam


GI & Abdominal Exam: Diminished Bowel Sounds





- Rectal Exam


Rectal Exam: Deferred





-  Exam


 Exam: NORMAL INSPECTION





- Extremities Exam


Extremities exam: Positive for: pedal edema, tenderness, pedal pulses present.  

Negative for: calf tenderness





- Back Exam


Back exam: absent: CVA tenderness (L), CVA tenderness (R)





- Neurological Exam


Neurological exam: Alert, CN II-XII Intact, Oriented x3, Reflexes Normal





- Psychiatric Exam


Psychiatric exam: Normal Mood





- Skin


Skin Exam: Dry





Results





- Vital Signs


Recent Vital Signs: 


 Last Vital Signs











Temp  98.6 F   08/25/17 16:53


 


Pulse  87   08/25/17 16:53


 


Resp  18   08/25/17 16:53


 


BP  131/79   08/25/17 16:53


 


Pulse Ox  98   08/25/17 19:43














- Labs


Result Diagrams: 


 08/29/17 11:34





 08/29/17 11:34





Assessment & Plan


(1) Cellulitis


Status: Acute   





(2) Crohn's disease


Status: Acute   





(3) Diabetes


Status: Acute   





(4) Diabetic ulcer of foot associated with diabetes mellitus due to underlying 

condition, limited to breakdown of skin


Status: Acute   





(5) History of left below knee amputation


Status: Acute   





(6) Leg pain


Status: Acute   





(7) Osteomyelitis of ankle or foot


Status: Acute   





(8) Uncontrolled diabetes mellitus


Status: Acute   





- Assessment and Plan (Free Text)


Assessment: 





cont iv antibotics and wound care

## 2017-08-25 NOTE — C.PDOC
History Of Present Illness


50 y/o male sent to ED by Dr. Maza for admission for osteomyelitis of right 

2nd toe. Pt had a PICC line inserted today, states he was supposed to get 

Avelox today but was that medication is back ordered.  Pt had blood work done 

today which showed elevated creatinine level.   He denies pain, fever, chills, 

falls/ injury.


Time Seen by Provider: 08/25/17 17:15


Chief Complaint (Nursing): Abnormal Labs


History Per: Patient


History/Exam Limitations: no limitations


Onset/Duration Of Symptoms: Days


Current Symptoms Are (Timing): Still Present


Recent travel outside of the [place]: No


Additional History Per: Patient





Past Medical History


Reviewed: Historical Data, Nursing Documentation, Vital Signs


Vital Signs: 


 Last Vital Signs











Temp  98.1 F   08/29/17 15:05


 


Pulse  78   08/29/17 15:05


 


Resp  20   08/29/17 15:05


 


BP  132/79   08/29/17 15:05


 


Pulse Ox  98   08/29/17 15:05














- Medical History


PMH: Anemia, Crohn's Disease, Depression, Diabetes, Fractures (Broke hip 2014 

or 2015)





- [facility] Procedures








CLOSED RED-INT FIX FEMUR (06/24/14)


FLUOROSCOPY OF SUPERIOR VENA CAVA, GUIDANCE (10/26/16)


INSERTION OF INFUSION DEV INTO SUP VENA CAVA, PERC APPROACH (10/26/16)








Family History: States: No Known Family Hx





- Social History


Hx Tobacco Use: Yes


Hx Alcohol Use: No


Hx Substance Use: No





- Immunization History


Hx Tetanus Toxoid Vaccination: No


Hx Influenza Vaccination: No


Hx Pneumococcal Vaccination: No





Review Of Systems


Except As Marked, All Systems Reviewed And Found Negative.


Constitutional: Negative for: Fever, Chills


Cardiovascular: Negative for: Chest Pain, Palpitations


Respiratory: Negative for: Shortness of Breath


Gastrointestinal: Negative for: Nausea, Vomiting, Abdominal Pain


Musculoskeletal: Positive for: Other (redness and swelling to right 2nd toe).  

Negative for: Foot Pain


Neurological: Negative for: Weakness, Numbness





Physical Exam





- Physical Exam


Appears: Well, Non-toxic, No Acute Distress


Skin: Warm, Dry, Other (mild erythema to plantar aspect of right 2nd toe)


Eye(s): bilateral: Normal Inspection


Oral Mucosa: Moist


Neck: Supple


Cardiovascular: Rhythm Regular


Respiratory: Normal Breath Sounds, No Rales, No Rhonchi, No Wheezing


Gastrointestinal/Abdominal: Normal Exam, Bowel Sounds, Soft, No Tenderness


Extremity: Normal ROM, No Tenderness, Capillary Refill (<2 sec all digits ), 

Swelling (right 2nd toe), Other (1cm of open wound to plantar aspect of right 

2nd toe with surrounding swelling; left BKA)


Pulses: Left Dorsalis Pedis: Normal, Right Dorsalis Pedis: Normal


Neurological/Psych: Oriented x3, Normal Motor, No Normal Sensation (decreased 

sensation to right foot)





ED Course And Treatment





- Laboratory Results


Result Diagrams: 


 08/29/17 11:34





 08/29/17 11:34


O2 Sat by Pulse Oximetry: 98 (on RA)


Pulse Ox Interpretation: Normal


Progress Note: Plan: Blood work, CXR ordered and reviewed.  Patient given IV NS 

bolus, IV insulin.  Spoke with Dr. Maza requests I wait to give antibiotics 

until ID Dr. Trinidad consult.  He states he did not want to give outpatient IV 

Vanco due to elevated Bun/Cr.





- Physician Consult Information


Physician Contacted: Liliane Brito


Outcome Of Conversation: Discussed patient with Dr. Brito, agrees with 

admission for osteomyelitis.





Disposition





- Disposition


Disposition: HOSPITALIZED


Disposition Time: 18:14


Condition: STABLE





- Clinical Impression


Clinical Impression: 


 Uncontrolled diabetes mellitus, Renal failure, Osteomyelitis








- Scribe Statement


The provider has reviewed the documentation as recorded by the Scribe





Anabella Gary





All medical record entries made by the Edyibkyara were at my direction and 

personally dictated by me. I have reviewed the chart and agree that the record 

accurately reflects my personal performance of the history, physical exam, 

medical decision making, and the department course for this patient. I have 

also personally directed, reviewed, and agree with the discharge instructions 

and disposition.





Decision To Admit





- .


Admitting Physician: Liliane Brito


Patient Diagnosis: 


 Renal failure, Osteomyelitis, Uncontrolled diabetes mellitus

## 2017-08-26 LAB
ALBUMIN/GLOB SERPL: 1 {RATIO} (ref 1–2.1)
ALP SERPL-CCNC: 80 U/L (ref 38–126)
ALT SERPL-CCNC: 23 U/L (ref 21–72)
AST SERPL-CCNC: 18 U/L (ref 17–59)
BASOPHILS # BLD AUTO: 0 K/UL (ref 0–0.2)
BASOPHILS NFR BLD: 0.8 % (ref 0–2)
BILIRUB SERPL-MCNC: 0.5 MG/DL (ref 0.2–1.3)
BUN SERPL-MCNC: 27 MG/DL (ref 9–20)
CALCIUM SERPL-MCNC: 9.3 MG/DL (ref 8.6–10.4)
CHLORIDE SERPL-SCNC: 105 MMOL/L (ref 98–107)
CO2 SERPL-SCNC: 22 MMOL/L (ref 22–30)
EOSINOPHIL # BLD AUTO: 0.3 K/UL (ref 0–0.7)
EOSINOPHIL NFR BLD: 6.1 % (ref 0–4)
ERYTHROCYTE [DISTWIDTH] IN BLOOD BY AUTOMATED COUNT: 14.1 % (ref 11.5–14.5)
GLOBULIN SER-MCNC: 3.5 GM/DL (ref 2.2–3.9)
GLUCOSE SERPL-MCNC: 285 MG/DL (ref 75–110)
HCT VFR BLD CALC: 26.8 % (ref 35–51)
LYMPHOCYTES # BLD AUTO: 1.9 K/UL (ref 1–4.3)
LYMPHOCYTES NFR BLD AUTO: 33.6 % (ref 20–40)
MCH RBC QN AUTO: 29.9 PG (ref 27–31)
MCHC RBC AUTO-ENTMCNC: 33.5 G/DL (ref 33–37)
MCV RBC AUTO: 89.1 FL (ref 80–94)
MONOCYTES # BLD: 0.3 K/UL (ref 0–0.8)
MONOCYTES NFR BLD: 5.7 % (ref 0–10)
NRBC BLD AUTO-RTO: 0.1 % (ref 0–2)
PLATELET # BLD: 231 K/UL (ref 130–400)
PMV BLD AUTO: 8 FL (ref 7.2–11.7)
POTASSIUM SERPL-SCNC: 4.4 MMOL/L (ref 3.6–5.2)
PROT SERPL-MCNC: 7 G/DL (ref 6.3–8.3)
SODIUM SERPL-SCNC: 141 MMOL/L (ref 132–148)
WBC # BLD AUTO: 5.7 K/UL (ref 4.8–10.8)

## 2017-08-26 RX ADMIN — Medication SCH CAP: at 09:45

## 2017-08-26 RX ADMIN — INSULIN ASPART SCH UNIT: 100 INJECTION, SOLUTION INTRAVENOUS; SUBCUTANEOUS at 22:12

## 2017-08-26 RX ADMIN — Medication SCH TAB: at 09:46

## 2017-08-26 RX ADMIN — INSULIN GLARGINE SCH: 100 INJECTION, SOLUTION SUBCUTANEOUS at 17:30

## 2017-08-26 RX ADMIN — INSULIN GLARGINE SCH UNITS: 100 INJECTION, SOLUTION SUBCUTANEOUS at 22:12

## 2017-08-26 RX ADMIN — INSULIN ASPART SCH UNIT: 100 INJECTION, SOLUTION INTRAVENOUS; SUBCUTANEOUS at 17:30

## 2017-08-26 RX ADMIN — INSULIN GLARGINE SCH: 100 INJECTION, SOLUTION SUBCUTANEOUS at 08:22

## 2017-08-26 RX ADMIN — INSULIN ASPART SCH UNIT: 100 INJECTION, SOLUTION INTRAVENOUS; SUBCUTANEOUS at 12:23

## 2017-08-26 RX ADMIN — PANTOPRAZOLE SODIUM SCH MG: 40 TABLET, DELAYED RELEASE ORAL at 09:46

## 2017-08-26 RX ADMIN — INSULIN ASPART SCH UNIT: 100 INJECTION, SOLUTION INTRAVENOUS; SUBCUTANEOUS at 08:17

## 2017-08-26 NOTE — CP.PCM.CON
History of Present Illness





- History of Present Illness


History of Present Illness: 





Surgery: Dr. Guerrero





CC: Vascular assessment B/L LE





HPI: 51M w. PMH of DM and anemia presents to [facility] for evaluation of B/L LE 

ulcers. Pt states that he has been seeing Dr. Maza for R 2nd toe osteomyelitis 

for the last 3-4 months. Pt states that over the last few weeks the ulcer has 

been getting redder and he notices drainage ranging from blood to pus. He has 

not noticed any odor. He does not have any pain 2/2 neuropathy. He denies F/C. 

Pt also has a hx of L BKA. Pt states that about a month ago he fell and injured 

the stump on his left leg. He states that an ulcer developed. It has been 

gradually improving over the last month but still persists. He has not noticed 

any drainage from this. Pt has no other complaints. Denies HA/blurred vision, 

no CP/palpitations, no SOB/cough, no hematuria/dysuria, no weakness/fatigue.





PMH: DM, anemia


PSH: L BKA, L hip


Meds: MAR reviewed


ALL: Percocet, PCN


SOcial: Former smoker, no ETOH/drugs


FHx: non-contributory





Review of Systems





- Review of Systems


All systems: reviewed and no additional remarkable complaints except (HPI)





Past Patient History





- Past Medical History & Family History


Past Medical History?: Yes





- Past Social History


Smoking Status: Former Smoker





- CARDIAC


Hx Hypertension: No (PT DENIES)





- PULMONARY


Hx Respiratory Disorders: No





- NEUROLOGICAL


Hx Neurological Disorder: No





- HEENT


Hx HEENT Problems: No





- RENAL


Hx Chronic Kidney Disease: No





- ENDOCRINE/METABOLIC


Hx Endocrine Disorders: Yes


Hx Diabetes Mellitus Type 2: Yes





- HEMATOLOGICAL/ONCOLOGICAL


Hx Anemia: Yes





- INTEGUMENTARY


Hx Dermatological Problems: No





- MUSCULOSKELETAL/RHEUMATOLOGICAL


Hx Falls: Yes





- GASTROINTESTINAL


Hx Crohn's Disease: No





- GENITOURINARY/GYNECOLOGICAL


Hx Genitourinary Disorders: No





- PSYCHIATRIC


Hx Substance Use: No





- SURGICAL HISTORY


Hx Surgeries: Yes


Hx Amputation: Yes (LEFT AKA)


Hx Orthopedic Surgery: Yes (LEFT FOOT X 6-8)


Other/Comment: BILATERAL VEIN STRIPPING AND LIGATION





- ANESTHESIA


Hx Anesthesia: Yes


Hx Anesthesia Reactions: No


Hx Malignant Hyperthermia: No





Meds


Allergies/Adverse Reactions: 


 Allergies











Allergy/AdvReac Type Severity Reaction Status Date / Time


 


acetaminophen [From Percocet] Allergy   Verified 08/25/17 16:53


 


oxycodone HCl [From Percocet] Allergy   Verified 08/25/17 16:53


 


Penicillins Allergy   Verified 08/25/17 16:53














- Medications


Medications: 


 Current Medications





Aspirin (Aspirin Chewable)  81 mg PO DAILY [facility]


   Last Admin: 08/26/17 09:46 Dose:  81 mg


Enalapril Maleate (Vasotec)  2.5 mg PO DAILY [facility]


   Last Admin: 08/26/17 09:50 Dose:  2.5 mg


Gabapentin (Neurontin)  100 mg PO DAILY [facility]


   Last Admin: 08/26/17 09:46 Dose:  100 mg


Daptomycin 340 mg/ Sodium (Chloride)  100 mls @ 200 mls/hr IV Q24H [facility]


   Stop: 08/30/17 20:01


   Last Admin: 08/26/17 20:26 Dose:  200 mls/hr


Insulin Aspart (Novolog)  0 unit SC ACHS [facility]


   PRN Reason: Protocol


   Last Admin: 08/26/17 17:30 Dose:  4 unit


Insulin Glargine (Lantus)  16 unit SC BIDAC [facility]


   Last Admin: 08/26/17 17:30 Dose:  Not Given


Lactobacillus Acidophilus (Bacid Acidophilus)  1 cap PO DAILY [facility]


   Last Admin: 08/26/17 09:45 Dose:  1 cap


Multivitamins (Hexavitamin)  1 tab PO DAILY [facility]


   Last Admin: 08/26/17 09:46 Dose:  1 tab


Mupirocin (Bactroban Ointment)  15 gm TOP BID [facility]


   Last Admin: 08/26/17 18:00 Dose:  1 applic


Pantoprazole Sodium (Protonix Ec Tab)  40 mg PO DAILY [facility]


   Last Admin: 08/26/17 09:46 Dose:  40 mg


Sertraline HCl (Zoloft)  100 mg PO DAILY [facility]


   Last Admin: 08/26/17 09:46 Dose:  100 mg











Physical Exam





- Constitutional


Appears: Non-toxic, No Acute Distress





- Head Exam


Head Exam: ATRAUMATIC, NORMOCEPHALIC





- Eye Exam


Eye Exam: EOMI





- ENT Exam


ENT Exam: Mucous Membranes Moist, Normal External Ear Exam





- Neck Exam


Neck exam: Positive for: Full Rom





- Respiratory Exam


Respiratory Exam: NORMAL BREATHING PATTERN.  absent: Accessory Muscle Use, 

Respiratory Distress





- Cardiovascular Exam


Cardiovascular Exam: REGULAR RHYTHM





- GI/Abdominal Exam


GI & Abdominal Exam: Soft.  absent: Tenderness





- Extremities Exam


Additional comments: 





RLE ~ 1x1cm ulcer distal/plantar aspect of R 2nd toe, no drainage noted


L BKA: ~2.5x2.5 cm ulcer noted over stump





- Neurological Exam


Neurological exam: Alert, Oriented x3





- Psychiatric Exam


Psychiatric exam: Normal Affect, Normal Mood





- Skin


Skin Exam: Dry, Warm





Results





- Vital Signs


Recent Vital Signs: 


 Last Vital Signs











Temp  97.4 F L  08/26/17 15:00


 


Pulse  81   08/26/17 15:00


 


Resp  20   08/26/17 15:00


 


BP  116/76   08/26/17 15:00


 


Pulse Ox  100   08/26/17 15:00














- Labs


Result Diagrams: 


 08/26/17 10:48





 08/26/17 10:48


Labs: 


 Laboratory Results - last 24 hr











  08/25/17 08/26/17 08/26/17





  21:53 01:56 06:16


 


WBC   


 


RBC   


 


Hgb   


 


Hct   


 


MCV   


 


MCH   


 


MCHC   


 


RDW   


 


Plt Count   


 


MPV   


 


Neut % (Auto)   


 


Lymph % (Auto)   


 


Mono % (Auto)   


 


Eos % (Auto)   


 


Baso % (Auto)   


 


Neut #   


 


Lymph #   


 


Mono #   


 


Eos #   


 


Baso #   


 


Sodium   


 


Potassium   


 


Chloride   


 


Carbon Dioxide   


 


Anion Gap   


 


BUN   


 


Creatinine   


 


Est GFR ( Amer)   


 


Est GFR (Non-Af Amer)   


 


POC Glucose (mg/dL)  259 H  230 H  315 H


 


Random Glucose   


 


Calcium   


 


Total Bilirubin   


 


AST   


 


ALT   


 


Alkaline Phosphatase   


 


Total Protein   


 


Albumin   


 


Globulin   


 


Albumin/Globulin Ratio   














  08/26/17 08/26/17 08/26/17





  10:48 10:48 11:17


 


WBC  5.7  


 


RBC  3.01 L  


 


Hgb  9.0 L  


 


Hct  26.8 L  


 


MCV  89.1  


 


MCH  29.9  


 


MCHC  33.5  


 


RDW  14.1  


 


Plt Count  231  


 


MPV  8.0  


 


Neut % (Auto)  53.8  


 


Lymph % (Auto)  33.6  


 


Mono % (Auto)  5.7  


 


Eos % (Auto)  6.1 H  


 


Baso % (Auto)  0.8  


 


Neut #  3.1  


 


Lymph #  1.9  


 


Mono #  0.3  


 


Eos #  0.3  


 


Baso #  0.0  


 


Sodium   141 


 


Potassium   4.4 


 


Chloride   105 


 


Carbon Dioxide   22 


 


Anion Gap   18 


 


BUN   27 H 


 


Creatinine   1.4 


 


Est GFR ( Amer)   > 60 


 


Est GFR (Non-Af Amer)   54 


 


POC Glucose (mg/dL)    303 H


 


Random Glucose   285 H 


 


Calcium   9.3 


 


Total Bilirubin   0.5 


 


AST   18 


 


ALT   23 


 


Alkaline Phosphatase   80 


 


Total Protein   7.0 


 


Albumin   3.5 


 


Globulin   3.5 


 


Albumin/Globulin Ratio   1.0 














  08/26/17





  16:21


 


WBC 


 


RBC 


 


Hgb 


 


Hct 


 


MCV 


 


MCH 


 


MCHC 


 


RDW 


 


Plt Count 


 


MPV 


 


Neut % (Auto) 


 


Lymph % (Auto) 


 


Mono % (Auto) 


 


Eos % (Auto) 


 


Baso % (Auto) 


 


Neut # 


 


Lymph # 


 


Mono # 


 


Eos # 


 


Baso # 


 


Sodium 


 


Potassium 


 


Chloride 


 


Carbon Dioxide 


 


Anion Gap 


 


BUN 


 


Creatinine 


 


Est GFR ( Amer) 


 


Est GFR (Non-Af Amer) 


 


POC Glucose (mg/dL)  278 H


 


Random Glucose 


 


Calcium 


 


Total Bilirubin 


 


AST 


 


ALT 


 


Alkaline Phosphatase 


 


Total Protein 


 


Albumin 


 


Globulin 


 


Albumin/Globulin Ratio 














Assessment & Plan





- Assessment and Plan (Free Text)


Assessment: 





51M w. R 2nd toe osteo and ulcer on L BKA stump


-Arterial PVR ordered, further recommendation based on findings


-c/w local wound care and abx


-d/w attending





[place] PGY3

## 2017-08-26 NOTE — CP.PCM.CON
History of Present Illness





- History of Present Illness


History of Present Illness: 





Podiatry Consult note for Dr. Maza





50 y/o male seen at bedside with attending Dr. Maza for right 2nd digit OM. 

Patient is AAOx3 and is in NAD. Patient is well known to attending Dr. Maza 

and has been following up with him for a while. Patient denies of any pain to 

the 2nd digit today. Patient appears to be resting comfortably in his bed. 

Patient denies of any other pedal complains. Patient denies of any recent F/N/V/

C/SOB/CP today.





Review of Systems





- Constitutional


Constitutional: As Per HPI





Past Patient History





- Past Medical History & Family History


Past Medical History?: Yes





- Past Social History


Smoking Status: Former Smoker





- CARDIAC


Hx Hypertension: No (PT DENIES)





- PULMONARY


Hx Respiratory Disorders: No





- NEUROLOGICAL


Hx Neurological Disorder: No





- HEENT


Hx HEENT Problems: No





- RENAL


Hx Chronic Kidney Disease: No





- ENDOCRINE/METABOLIC


Hx Endocrine Disorders: Yes


Hx Diabetes Mellitus Type 2: Yes





- HEMATOLOGICAL/ONCOLOGICAL


Hx Anemia: Yes





- INTEGUMENTARY


Hx Dermatological Problems: No





- MUSCULOSKELETAL/RHEUMATOLOGICAL


Hx Falls: Yes





- GASTROINTESTINAL


Hx Crohn's Disease: No





- GENITOURINARY/GYNECOLOGICAL


Hx Genitourinary Disorders: No





- PSYCHIATRIC


Hx Substance Use: No





- SURGICAL HISTORY


Hx Surgeries: Yes


Hx Amputation: Yes (LEFT AKA)


Hx Orthopedic Surgery: Yes (LEFT FOOT X 6-8)


Other/Comment: BILATERAL VEIN STRIPPING AND LIGATION





- ANESTHESIA


Hx Anesthesia: Yes


Hx Anesthesia Reactions: No


Hx Malignant Hyperthermia: No





Meds


Allergies/Adverse Reactions: 


 Allergies











Allergy/AdvReac Type Severity Reaction Status Date / Time


 


acetaminophen [From Percocet] Allergy   Verified 08/25/17 16:53


 


oxycodone HCl [From Percocet] Allergy   Verified 08/25/17 16:53


 


Penicillins Allergy   Verified 08/25/17 16:53














- Medications


Medications: 


 Current Medications





Aspirin (Aspirin Chewable)  [facility]


   Last Admin: 08/26/17 09:46 Dose:  81 mg


Enalapril Maleate (Vasotec)  2.5 mg PO [facility]ILY [facility]


   Last Admin: 08/26/17 09:50 Dose:  2.5 mg


Gabapentin (Neurontin)  100 mg PO [facility]ILY [facility]


   Last Admin: 08/26/17 09:46 Dose:  100 mg


Daptomycin 340 mg/ Sodium (Chloride)  100 mls @ 200 mls/hr IV [facility]H [facility]


   Stop: 08/30/17 20:01


   Last Admin: 08/25/17 21:01 Dose:  200 mls/hr


Insulin Aspart (Novolog)  0 unit SC [facility]CHS [facility]


   PRN Reason: Protocol


   Last Admin: 08/26/17 12:23 Dose:  6 unit


Insulin Glargine (Lantus)  16 unit SC [facility]IDAC [facility]


   Last Admin: 08/26/17 08:22 Dose:  Not Given


Lactobacillus Acidophilus (Bacid Acidophilus)  1 cap PO DAILY [facility]


   Last Admin: 08/26/17 09:45 Dose:  1 cap


Multivitamins (Hexavitamin)  1 tab PO [facility]ILY [facility]


   Last Admin: 08/26/17 09:46 Dose:  1 tab


Pantoprazole Sodium (Protonix Ec Tab)  40 mg PO [facility]ILY [facility]


   Last Admin: 08/26/17 09:46 Dose:  40 mg


Sertraline HCl (Zoloft)  100 mg PO [facility]ILY [facility]


   Last Admin: 08/26/17 09:46 Dose:  100 mg











Physical Exam





- Constitutional


Appears: Well, Non-toxic, No Acute Distress





- Extremities Exam


Additional comments: 





Right LE examination:





VASC: DP/PT pulses are faintly palpable 1/4, CRT: < 3 sec to digits, TG: warm 

to cool from proximal to distal, non-pitting edema noted on the right 2nd digit 

dorsally





DERM: Circular ulcer measuring approx. 0.8 cm x 0.5 x 0.2 cm noted at the 

distal aspect of the 2nd digit, no active drainage, no malodor, no probe to bone

, no undermining, wound base is fibrogranular, no erythema noted





NEURO: Protective sensation grossly diminished 





ORTHO: mild tenderness on palpation of the 2nd digit, BKA on the left 





- Neurological Exam


Neurological exam: Alert, Oriented x3





- Psychiatric Exam


Psychiatric exam: Normal Affect, Normal Mood





Results





- Vital Signs


Recent Vital Signs: 


 Last Vital Signs











Temp  97.4 F L  08/26/17 15:00


 


Pulse  81   08/26/17 15:00


 


Resp  20   08/26/17 15:00


 


BP  116/76   08/26/17 15:00


 


Pulse Ox  100   08/26/17 15:00














- Labs


Result Diagrams: 


 08/26/17 10:48





 08/26/17 10:48


Labs: 


 Laboratory Results - last 24 hr











  08/25/17 08/25/17 08/26/17





  19:41 21:53 01:56


 


WBC   


 


RBC   


 


Hgb   


 


Hct   


 


MCV   


 


MCH   


 


MCHC   


 


RDW   


 


Plt Count   


 


MPV   


 


Neut % (Auto)   


 


Lymph % (Auto)   


 


Mono % (Auto)   


 


Eos % (Auto)   


 


Baso % (Auto)   


 


Neut #   


 


Lymph #   


 


Mono #   


 


Eos #   


 


Baso #   


 


Sodium   


 


Potassium   


 


Chloride   


 


Carbon Dioxide   


 


Anion Gap   


 


BUN   


 


Creatinine   


 


Est GFR ( Amer)   


 


Est GFR (Non-Af Amer)   


 


POC Glucose (mg/dL)  158 H  259 H  230 H


 


Random Glucose   


 


Calcium   


 


Total Bilirubin   


 


AST   


 


ALT   


 


Alkaline Phosphatase   


 


Total Protein   


 


Albumin   


 


Globulin   


 


Albumin/Globulin Ratio   














  08/26/17 08/26/17 08/26/17





  06:16 10:48 10:48


 


WBC   5.7 


 


RBC   3.01 L 


 


Hgb   9.0 L 


 


Hct   26.8 L 


 


MCV   89.1 


 


MCH   29.9 


 


MCHC   33.5 


 


RDW   14.1 


 


Plt Count   231 


 


MPV   8.0 


 


Neut % (Auto)   53.8 


 


Lymph % (Auto)   33.6 


 


Mono % (Auto)   5.7 


 


Eos % (Auto)   6.1 H 


 


Baso % (Auto)   0.8 


 


Neut #   3.1 


 


Lymph #   1.9 


 


Mono #   0.3 


 


Eos #   0.3 


 


Baso #   0.0 


 


Sodium    141


 


Potassium    4.4


 


Chloride    105


 


Carbon Dioxide    22


 


Anion Gap    18


 


BUN    27 H


 


Creatinine    1.4


 


Est GFR ( Amer)    > 60


 


Est GFR (Non-Af Amer)    54


 


POC Glucose (mg/dL)  315 H  


 


Random Glucose    285 H


 


Calcium    9.3


 


Total Bilirubin    0.5


 


AST    18


 


ALT    23


 


Alkaline Phosphatase    80


 


Total Protein    7.0


 


Albumin    3.5


 


Globulin    3.5


 


Albumin/Globulin Ratio    1.0














  08/26/17





  11:17


 


WBC 


 


RBC 


 


Hgb 


 


Hct 


 


MCV 


 


MCH 


 


MCHC 


 


RDW 


 


Plt Count 


 


MPV 


 


Neut % (Auto) 


 


Lymph % (Auto) 


 


Mono % (Auto) 


 


Eos % (Auto) 


 


Baso % (Auto) 


 


Neut # 


 


Lymph # 


 


Mono # 


 


Eos # 


 


Baso # 


 


Sodium 


 


Potassium 


 


Chloride 


 


Carbon Dioxide 


 


Anion Gap 


 


BUN 


 


Creatinine 


 


Est GFR ( Amer) 


 


Est GFR (Non-Af Amer) 


 


POC Glucose (mg/dL)  303 H


 


Random Glucose 


 


Calcium 


 


Total Bilirubin 


 


AST 


 


ALT 


 


Alkaline Phosphatase 


 


Total Protein 


 


Albumin 


 


Globulin 


 


Albumin/Globulin Ratio 














Assessment & Plan





- Assessment and Plan (Free Text)


Assessment: 





50 y/o male seen at bedside for right 2nd digit OM


Plan: 





Patient evaluated and charts reviewed with attending Dr. Maza


Labs and vitals reviewed (afebrile, WBC @ 5.7)


Dressing applied using bactroban, DSD


Dr. Maza discussed patient with Dr. Guerrero


   - Vascular consult placed


Patient to receive IV abx as per ID


Thank you for the podiatry consult - Podiatry to follow while patient is in 

house








- Date & Time


Date: 08/26/17


Time: 09:00

## 2017-08-26 NOTE — HP
HISTORY OF PRESENT ILLNESS:  The patient is seen today, 08/26/2017.  He is

51 years old  male with history of multiple medical problems, was

admitted to the hospital after evaluation in emergency room for

osteomyelitis of the right foot.  The patient is status post left BKA.  Due

to his abnormal kidney function in addition to the need for extended period

of IV antibiotics, the patient was admitted to the hospital for ID

consultation as well as initiation of the IV antibiotics and decision

regarding the place where the patient will get his needed IV antibiotics. 

The patient admitted that he has pain and some occasional discharge from

the right foot wound.  The patient is status post left BKA about 4 years

ago.



OTHER REVIEW OF SYSTEMS:  Negative.



ALLERGIES:  THE PATIENT HAS ALLERGY TO ACETAMINOPHEN, OXYCODONE AND

PENICILLIN.



SOCIAL HISTORY:  Positive smoker, no ETOH or substance abuse.



FAMILY HISTORY:  Noncontributory.



PAST MEDICAL HISTORY:  Chronic kidney disease, stage III; type 1 diabetes

mellitus; diabetic nephropathy; peripheral vascular disease, status post

left BKA.



PHYSICAL EXAMINATION:

GENERAL:  The patient is in bed, comfortable at the time of this

examination.

VITAL SIGNS:  Blood pressure 129/79, temperature 97.6, respiratory rate 20

and pulse 78.

HEENT:  Pupils equal, reactive to light.  Normal appearing mucosa of the

conjunctivae, oropharyngeal and nasal membrane mucosa.

NECK:  Supple.  No JVD.  No carotid bruit.  No lymph node.  No thyromegaly.

CHEST AND LUNGS:  Bilateral symmetrical expansion.  Good air exchange.  No

rales.  No rhonchi.

CARDIOVASCULAR SYSTEM:  PMI not localized.  S1 and S2.  No additional

sounds.

ABDOMEN:  Normoactive bowel sounds.  No tenderness.  No organomegaly.  No

masses.

EXTREMITIES:  Left BKA with slight redness of the stump and the right foot

is surgically dressed by podiatrist.

CENTRAL NERVOUS SYSTEM:  Alert, awake and oriented x3.  No neurological

deficits could be appreciated.



ASSESSMENT:

1.  Osteomyelitis of the right foot.

2.  Chronic kidney disease.

3.  Diabetic nephropathy.

4.  Chronic kidney disease stage III.

5.  Type 1 diabetes mellitus.



PLAN:  ID and podiatry consult.  Follow recommendations of ID consultant

regarding the IV antibiotics.  Reviewing patient's home medications.







__________________________________________

Liliane Brito MD





DD:  08/26/2017 13:24:30

DT:  08/26/2017 15:20:55

[facility] # [id number]

## 2017-08-26 NOTE — CP.PCM.CON
History of Present Illness





- History of Present Illness


History of Present Illness: 





51 year old male for OM of toe 2 right foot .Pt presented in mid July 2017 with 

swollen toe and MRI


revealed OM .Pt declined hospital at that time stating "business concerns" and 

family vacation.


He was given oral Cipro and instructed he needed Iv antibiotics and vascular 

eval or he could


lose leg .





Past Patient History





- Past Medical History & Family History


Past Medical History?: Yes





- Past Social History


Smoking Status: Former Smoker





- CARDIAC


Hx Hypertension: No (PT DENIES)





- PULMONARY


Hx Respiratory Disorders: No





- NEUROLOGICAL


Hx Neurological Disorder: No





- HEENT


Hx HEENT Problems: No





- RENAL


Hx Chronic Kidney Disease: No





- ENDOCRINE/METABOLIC


Hx Endocrine Disorders: Yes


Hx Diabetes Mellitus Type 2: Yes





- HEMATOLOGICAL/ONCOLOGICAL


Hx Anemia: Yes





- INTEGUMENTARY


Hx Dermatological Problems: No





- MUSCULOSKELETAL/RHEUMATOLOGICAL


Hx Falls: Yes





- GASTROINTESTINAL


Hx Crohn's Disease: No





- GENITOURINARY/GYNECOLOGICAL


Hx Genitourinary Disorders: No





- PSYCHIATRIC


Hx Substance Use: No





- SURGICAL HISTORY


Hx Surgeries: Yes


Hx Amputation: Yes (LEFT AKA)


Hx Orthopedic Surgery: Yes (LEFT FOOT X 6-8)


Other/Comment: BILATERAL VEIN STRIPPING AND LIGATION





- ANESTHESIA


Hx Anesthesia: Yes


Hx Anesthesia Reactions: No


Hx Malignant Hyperthermia: No





Meds


Allergies/Adverse Reactions: 


 Allergies











Allergy/AdvReac Type Severity Reaction Status Date / Time


 


acetaminophen [From Percocet] Allergy   Verified 08/25/17 16:53


 


oxycodone HCl [From Percocet] Allergy   Verified 08/25/17 16:53


 


Penicillins Allergy   Verified 08/25/17 16:53














- Medications


Medications: 


 Current Medications





Aspirin (Aspirin Chewable)  81 mg PO DAILY [facility]


Enalapril Maleate (Vasotec)  2.5 mg PO DAILY [facility]


Gabapentin (Neurontin)  100 mg PO DAILY [facility]


Daptomycin 340 mg/ Sodium (Chloride)  100 mls @ 200 mls/hr IV Q24H [facility]


   Stop: 08/30/17 20:01


   Last Admin: 08/25/17 21:01 Dose:  200 mls/hr


Insulin Aspart (Novolog)  0 unit SC ACHS [facility]


   PRN Reason: Protocol


   Last Admin: 08/26/17 08:17 Dose:  6 unit


Insulin Glargine (Lantus)  16 unit SC BIDAC [facility]


   Last Admin: 08/26/17 08:22 Dose:  Not Given


Lactobacillus Acidophilus (Bacid Acidophilus)  1 cap PO DAILY [facility]


Multivitamins (Hexavitamin)  1 tab PO DAILY [facility]


Pantoprazole Sodium (Protonix Ec Tab)  40 mg PO DAILY [facility]


Sertraline HCl (Zoloft)  100 mg PO DAILY [facility]











Results





- Vital Signs


Recent Vital Signs: 


 Last Vital Signs











Temp  97.6 F   08/26/17 07:50


 


Pulse  78   08/26/17 07:50


 


Resp  20   08/26/17 07:50


 


BP  127/69   08/26/17 07:50


 


Pulse Ox  99   08/26/17 07:50














- Labs


Result Diagrams: 


 08/26/17 10:48





 08/26/17 10:48


Labs: 


 Laboratory Results - last 24 hr











  08/25/17 08/25/17 08/26/17





  19:41 21:53 01:56


 


POC Glucose (mg/dL)  158 H  259 H  230 H














  08/26/17





  06:16


 


POC Glucose (mg/dL)  315 H

## 2017-08-27 RX ADMIN — INSULIN ASPART SCH: 100 INJECTION, SOLUTION INTRAVENOUS; SUBCUTANEOUS at 22:31

## 2017-08-27 RX ADMIN — PANTOPRAZOLE SODIUM SCH MG: 40 TABLET, DELAYED RELEASE ORAL at 09:07

## 2017-08-27 RX ADMIN — Medication SCH CAP: at 09:07

## 2017-08-27 RX ADMIN — INSULIN GLARGINE SCH UNITS: 100 INJECTION, SOLUTION SUBCUTANEOUS at 08:16

## 2017-08-27 RX ADMIN — INSULIN GLARGINE SCH UNITS: 100 INJECTION, SOLUTION SUBCUTANEOUS at 17:06

## 2017-08-27 RX ADMIN — Medication SCH TAB: at 09:07

## 2017-08-27 RX ADMIN — INSULIN ASPART SCH UNIT: 100 INJECTION, SOLUTION INTRAVENOUS; SUBCUTANEOUS at 08:17

## 2017-08-27 RX ADMIN — INSULIN ASPART SCH UNIT: 100 INJECTION, SOLUTION INTRAVENOUS; SUBCUTANEOUS at 12:39

## 2017-08-27 RX ADMIN — INSULIN ASPART SCH UNIT: 100 INJECTION, SOLUTION INTRAVENOUS; SUBCUTANEOUS at 17:06

## 2017-08-27 NOTE — CP.PCM.CON
History of Present Illness





- History of Present Illness


History of Present Illness: 





2 y/o male seen at bedside for right 2nd digit OM. Patient is AAOx3 and is in 

NAD. Patient is well known to attending Dr. Maza and has been following up 

with him for a while. Patient denies of any pain to the 2nd digit today. 

Patient appears to be resting comfortably in his bed. Patient denies of any 

other pedal complains. Patient denies of any recent F/N/V/C/SOB/CP today.








Review of Systems





- Review of Systems


All systems: reviewed and no additional remarkable complaints except





- Constitutional


Constitutional: As Per HPI





- EENT


Eyes: absent: As Per HPI, Blind Spots, Blurred Vision, Change in Vision, 

Decreased Night Vision, Diplopia, Discharge, Dry Eye, Exophthalmos, Floaters, 

Irritation, Itchy Eyes, Loss of Peripheral Vision, Pain, Photophobia, Requires 

Corrective Lenses, Sees Flashes, Spots in Vision, Tunnel Vision, Other Visual 

Disturbances, Loss of Vision, Other


Ears: absent: As Per HPI, Decreased Hearing, Ear Discharge, Ear Pain, Tinnitus, 

Abnormal Hearing, Disequilibrium, Dizziness, Other


Nose/Mouth/Throat: absent: As Per HPI, Epistaxis, Nasal Congestion, Nasal 

Discharge, Nasal Obstruction, Nasal Trauma, Nose Pain, Post Nasal Drip, Sinus 

Pain, Sinus Pressure, Bleeding Gums, Change in Voice, Dental Pain, Dry Mouth, 

Dysphagia, Halitosis, Hoarsness, Lip Swelling, Mouth Lesions, Mouth Pain, 

Odynophagia, Sore Throat, Throat Swelling, Tongue Swelling, Facial Pain, Neck 

Pain, Neck Mass, Other





- Cardiovascular


Cardiovascular: absent: As Per HPI, Acrocyanosis, Chest Pain, Chest Pain at Rest

, Chest Pain with Activity, Claudication, Diaphoresis, Dyspnea, Dyspnea on 

Exertion, Edema, Irregular Heart Rhythm, Pain Radiating to Arm/Neck/Jaw, Leg 

Edema, Leg Ulcers, Lightheadedness, Orthopnea, Palpitations, Paroxysmal 

Nocturnal Dyspnea, Pedal Edema, Radiating Pain, Rapid Heart Rate, Slow Heart 

Rate, Syncope, Other





- Respiratory


Respiratory: absent: As Per HPI, Cough, Dyspnea, Hemoptysis, Dyspnea on Exertion

, Wheezing, Snoring, Stridor, Pain on Inspiration, Chest Congestion, Excessive 

Mucous Production, Change in Mucous Color, Pain with Coughing, Other





- Gastrointestinal


Gastrointestinal: absent: As Per HPI, Abdominal Pain, Belching, Bloating, 

Change in Bowel Habits, Change in Stool Character, Coffee Ground Emesis, 

Constipation, Cramping, Diarrhea, Dyspepsia, Dysphagia, Early Satiety, 

Excessive Flatus, Fecal Incontinence, Heartburn, Hematemesis, Hematochezia, 

Loose Stools, Melena, Nausea, Odynophagia, Temesmus, Vomiting, Other





- Genitourinary


Genitourinary: absent: As Per HPI, Change in Urinary Stream, Difficulty 

Urinating, Dysuria, Flank Pain, Hematuria, Pyuria, Nocturia, Urinary 

Incontinence, Urinary Frequency, Urinary Hesitance, Urinary Urgency, Voiding 

Freq/Small Amts, Freq UTI, Hx Renal/Bladder Calculi, Hx /Renal Surgery, 

Bladder Distension, Other





- Musculoskeletal


Musculoskeletal: absent: As Per HPI, Abnormal Gait, Arthralgias, Atrophy, Back 

Pain, Deformity, Joint Swelling, Limited Range of Motion, Loss of Height, 

Muscle Cramps, Muscle Weakness, Myalgias, Neck Pain, Numbness, Radiating Pain 

into Limb, Stiffness, Tingling, Other





- Integumentary


Integumentary: absent: As Per HPI, Acne, Alopecia, Bleeding Lesions, Change in 

Hair, Change in Nails, Change in Pigmentation, Changing Lesions, Dry Skin, 

Erythema, Furuncle, Hirsutism, Lesions, New Lesions, Non-Healing Lesions, 

Photosensitivity, Pruritus, Rash, Skin Pain, Skin Ulcer, Sores, Striae, Swelling

, Unusual Bruising, Wounds, Jaundice, Other





- Neurological


Neurological: absent: As Per HPI, Abnormal Gait, Abnormal Hearing, Abnormal 

Movements, Abnormal Speech, Behavioral Changes, Burning Sensations, Confusion, 

Convulsions, Disequilibrium, Dizziness, Numbness, Focal Weakness, Frequent Falls

, Headaches, Lack of Coordination, Loss of Vision, Memory Loss, Paresthesias, 

Radicular Pain, Restless Legs, Sensory Deficit, Syncope, Tingling, Tremor, 

Vertigo, Weakness, Other Visual Disturbances, Other





- Psychiatric


Psychiatric: absent: As Per HPI, Abnormal Sleep Pattern, Anhedonia, Anxiety, 

Auditory Hallucinations, Behavioral Changes, Change in Appetite, Change in 

Libido, Confusion, Depression, Difficulty Concentrating, Hallucinations, 

Homicidal Ideation, Hopelessness, Irritability, Memory Loss, Mood Swings, Panic 

Attacks, Paranoia, Suicidal Ideation, Visual Hallucinations, Tactile 

Hallucinations, Other





Past Patient History





- Past Medical History & Family History


Past Medical History?: Yes





- Past Social History


Smoking Status: Former Smoker





- CARDIAC


Hx Hypertension: No (PT DENIES)





- PULMONARY


Hx Respiratory Disorders: No





- NEUROLOGICAL


Hx Neurological Disorder: No





- HEENT


Hx HEENT Problems: No





- RENAL


Hx Chronic Kidney Disease: No





- ENDOCRINE/METABOLIC


Hx Endocrine Disorders: Yes


Hx Diabetes Mellitus Type 2: Yes





- HEMATOLOGICAL/ONCOLOGICAL


Hx Anemia: Yes





- INTEGUMENTARY


Hx Dermatological Problems: No





- MUSCULOSKELETAL/RHEUMATOLOGICAL


Hx Falls: Yes





- GASTROINTESTINAL


Hx Crohn's Disease: No





- GENITOURINARY/GYNECOLOGICAL


Hx Genitourinary Disorders: No





- PSYCHIATRIC


Hx Substance Use: No





- SURGICAL HISTORY


Hx Surgeries: Yes


Hx Amputation: Yes (LEFT AKA)


Hx Orthopedic Surgery: Yes (LEFT FOOT X 6-8)


Other/Comment: BILATERAL VEIN STRIPPING AND LIGATION





- ANESTHESIA


Hx Anesthesia: Yes


Hx Anesthesia Reactions: No


Hx Malignant Hyperthermia: No





Meds


Allergies/Adverse Reactions: 


 Allergies











Allergy/AdvReac Type Severity Reaction Status Date / Time


 


acetaminophen [From Percocet] Allergy   Verified 08/25/17 16:53


 


oxycodone HCl [From Percocet] Allergy   Verified 08/25/17 16:53


 


Penicillins Allergy   Verified 08/25/17 16:53














- Medications


Medications: 


 Current Medications





Aspirin (Aspirin Chewable)  81 mg PO DAILY [facility]


   Last Admin: 08/27/17 09:07 Dose:  81 mg


Enalapril Maleate (Vasotec)  2.5 mg PO DAILY [facility]


   Last Admin: 08/27/17 09:11 Dose:  2.5 mg


Gabapentin (Neurontin)  100 mg PO DAILY [facility]


   Last Admin: 08/27/17 09:07 Dose:  100 mg


Daptomycin 340 mg/ Sodium (Chloride)  100 mls @ 200 mls/hr IV Q24H [facility]


   Stop: 08/30/17 20:01


   Last Admin: 08/26/17 20:26 Dose:  200 mls/hr


Insulin Aspart (Novolog)  0 unit SC ACHS [facility]


   PRN Reason: Protocol


   Last Admin: 08/27/17 12:39 Dose:  3 unit


Insulin Glargine (Lantus)  16 unit SC BIDAC [facility]


   Last Admin: 08/27/17 08:16 Dose:  16 units


Lactobacillus Acidophilus (Bacid Acidophilus)  1 cap PO DAILY [facility]


   Last Admin: 08/27/17 09:07 Dose:  1 cap


Morphine Sulfate (Morphine)  1 mg IVP Q4 PRN


   PRN Reason: Pain, moderate (4-7)


   Last Admin: 08/27/17 02:19 Dose:  1 mg


Multivitamins (Hexavitamin)  1 tab PO DAILY [facility]


   Last Admin: 08/27/17 09:07 Dose:  1 tab


Mupirocin (Bactroban Ointment)  15 gm TOP BID [facility]


   Last Admin: 08/27/17 09:08 Dose:  1 applic


Pantoprazole Sodium (Protonix Ec Tab)  40 mg PO DAILY [facility]


   Last Admin: 08/27/17 09:07 Dose:  40 mg


Sertraline HCl (Zoloft)  100 mg PO DAILY [facility]


   Last Admin: 08/27/17 09:07 Dose:  100 mg











Physical Exam





- Constitutional


Appears: Non-toxic, Chronically Ill





- Head Exam


Head Exam: NORMOCEPHALIC





- Eye Exam


Eye Exam: PERRL





- ENT Exam


ENT Exam: Mucous Membranes Dry, Normal External Ear Exam





- Neck Exam


Neck exam: Negative for: Tenderness





- Respiratory Exam


Respiratory Exam: Clear to Auscultation Bilateral





- Cardiovascular Exam


Cardiovascular Exam: REGULAR RHYTHM





- GI/Abdominal Exam


GI & Abdominal Exam: Diminished Bowel Sounds, Soft





- Rectal Exam


Rectal Exam: Deferred





-  Exam


 Exam: NORMAL INSPECTION





- Extremities Exam


Extremities exam: Positive for: joint swelling, pedal edema, tenderness


Additional comments: 





left bka





- Back Exam


Back exam: absent: CVA tenderness (L), CVA tenderness (R)





- Neurological Exam


Neurological exam: Alert, CN II-XII Intact, Oriented x3, Reflexes Normal





- Psychiatric Exam


Psychiatric exam: Normal Mood





- Skin


Skin Exam: Dry





Results





- Vital Signs


Recent Vital Signs: 


 Last Vital Signs











Temp  97.5 F L  08/27/17 07:25


 


Pulse  73   08/27/17 07:25


 


Resp  20   08/27/17 07:25


 


BP  122/80   08/27/17 09:11


 


Pulse Ox  98   08/27/17 07:25














- Labs


Result Diagrams: 


 08/26/17 10:48





 08/26/17 10:48


Labs: 


 Laboratory Results - last 24 hr











  08/26/17 08/27/17 08/27/17





  21:03 02:18 06:17


 


POC Glucose (mg/dL)  334 H  310 H  315 H














  08/27/17





  11:28


 


POC Glucose (mg/dL)  221 H














Assessment & Plan


(1) Osteomyelitis of ankle or foot


Status: Acute   





(2) Cellulitis


Status: Acute   





(3) Crohn's disease


Status: Acute   





(4) Diabetic ulcer of foot associated with diabetes mellitus due to underlying 

condition, limited to breakdown of skin


Status: Acute   





(5) History of left below knee amputation


Status: Acute   





- Assessment and Plan (Free Text)


Assessment: 





cont iv cubicin x 6-8 weeks 


Plan: 





possible or/ amp

## 2017-08-27 NOTE — CP.PCM.PN
Subjective





- Date & Time of Evaluation


Date of Evaluation: 08/27/17


Time of Evaluation: 08:40





- Subjective


Subjective: 





Podiatry Consult note for Dr. Maza





52 y/o male seen at bedside for right 2nd digit OM. Patient is AAOx3 and is in 

NAD. Patient denies of any pain to the 2nd digit today. Patient appears to be 

resting comfortably in his bed. Patient denies of any acute overnight events. 

Patient denies of any other pedal complains. Patient denies of any recent F/N/V/

C/SOB/CP today.








Objective





- Vital Signs/Intake and Output


Vital Signs (last 24 hours): 


 











Temp Pulse Resp BP Pulse Ox


 


 98.1 F   77   20   116/74   99 


 


 08/27/17 15:15  08/27/17 15:15  08/27/17 15:15  08/27/17 15:15  08/27/17 15:15








Intake and Output: 


 











 08/27/17 08/27/17





 06:59 18:59


 


Intake Total 600 500


 


Output Total 500 


 


Balance 100 500














- Medications


Medications: 


 Current Medications





Aspirin (Aspirin Chewable)  81 mg PO DAILY [facility]


   Last Admin: 08/27/17 09:07 Dose:  81 mg


Enalapril Maleate (Vasotec)  2.5 mg PO DAILY [facility]


   Last Admin: 08/27/17 09:11 Dose:  2.5 mg


Gabapentin (Neurontin)  100 mg PO DAILY [facility]


   Last Admin: 08/27/17 09:07 Dose:  100 mg


Daptomycin 340 mg/ Sodium (Chloride)  100 mls @ 200 mls/hr IV Q24H [facility]


   Stop: 08/30/17 20:01


   Last Admin: 08/26/17 20:26 Dose:  200 mls/hr


Insulin Aspart (Novolog)  0 unit SC ACHS [facility]


   PRN Reason: Protocol


   Last Admin: 08/27/17 17:06 Dose:  3 unit


Insulin Glargine (Lantus)  16 unit SC BIDAC [facility]


   Last Admin: 08/27/17 17:06 Dose:  16 units


Lactobacillus Acidophilus (Bacid Acidophilus)  1 cap PO DAILY [facility]


   Last Admin: 08/27/17 09:07 Dose:  1 cap


Morphine Sulfate (Morphine)  1 mg IVP Q4 PRN


   PRN Reason: Pain, moderate (4-7)


   Last Admin: 08/27/17 02:19 Dose:  1 mg


Multivitamins (Hexavitamin)  1 tab PO DAILY [facility]


   Last Admin: 08/27/17 09:07 Dose:  1 tab


Mupirocin (Bactroban Ointment)  15 gm TOP BID [facility]


   Last Admin: 08/27/17 17:10 Dose:  Not Given


Pantoprazole Sodium (Protonix Ec Tab)  40 mg PO DAILY [facility]


   Last Admin: 08/27/17 09:07 Dose:  40 mg


Sertraline HCl (Zoloft)  100 mg PO DAILY [facility]


   Last Admin: 08/27/17 09:07 Dose:  100 mg











- Labs


Labs: 


 





 08/26/17 10:48 





 08/26/17 10:48 











- Constitutional


Appears: Well, Non-toxic, No Acute Distress





- Extremities Exam


Additional comments: 





Right LE examination:





VASC: DP/PT pulses are faintly palpable 1/4, CRT: < 3 sec to digits, TG: warm 

to cool from proximal to distal, non-pitting edema noted on the right 2nd digit 

dorsally





DERM: Circular ulcer measuring approx. 0.8 cm x 0.5 x 0.2 cm noted at the 

distal aspect of the 2nd digit, no active drainage, no malodor, no probe to bone

, no undermining, wound base is fibrogranular, no erythema noted





NEURO: Protective sensation grossly diminished 





ORTHO: mild tenderness on palpation of the 2nd digit, BKA on the left 





- Neurological Exam


Neurological Exam: Alert, Awake, Oriented x3





- Psychiatric Exam


Psychiatric exam: Normal Affect, Normal Mood





Assessment and Plan





- Assessment and Plan (Free Text)


Assessment: 





52 y/o male seen at bedside for right 2nd digit OM


Plan: 





Patient evaluated and charts reviewed with attending Dr. Maza


Labs and vitals reviewed (afebrile, WBC @ 5.7 as of yesterday)


Dressing applied using bactroban, DSD


Vascular team is following the patient


   - Vascular team ordered PVR and will act according to the findings 


Patient to receive IV abx as per ID


   -IV cubicin for 6-8 weeks as per Dr. Trinidad 


Podiatry to follow while patient is in house

## 2017-08-27 NOTE — CP.PCM.PN
Subjective





- Date & Time of Evaluation


Date of Evaluation: 08/27/17


Time of Evaluation: 07:50





- Subjective


Subjective: 








S: Patient was seen and examined at bedside. Resting comfortably in NAD. No 

events noted overnight. This morning, patient reports mild pain to the R foot 

and left lower extremity stump. Currently denies signs of CP, SOB, abdominal 

pain, n/v/d. No other complaints noted at this time.











Objective





- Vital Signs/Intake and Output


Vital Signs (last 24 hours): 


 











Temp Pulse Resp BP Pulse Ox


 


 97.5 F L  73   20   122/80   98 


 


 08/27/17 07:25  08/27/17 07:25  08/27/17 07:25  08/27/17 09:11  08/27/17 07:25








Intake and Output: 


 











 08/27/17 08/27/17





 06:59 18:59


 


Intake Total 600 


 


Output Total 500 


 


Balance 100 














- Medications


Medications: 


 Current Medications





Aspirin (Aspirin Chewable)  81 mg PO DAILY [facility]


   Last Admin: 08/27/17 09:07 Dose:  81 mg


Enalapril Maleate (Vasotec)  2.5 mg PO DAILY [facility]


   Last Admin: 08/27/17 09:11 Dose:  2.5 mg


Gabapentin (Neurontin)  100 mg PO DAILY [facility]


   Last Admin: 08/27/17 09:07 Dose:  100 mg


Daptomycin 340 mg/ Sodium (Chloride)  100 mls @ 200 mls/hr IV Q24H [facility]


   Stop: 08/30/17 20:01


   Last Admin: 08/26/17 20:26 Dose:  200 mls/hr


Insulin Aspart (Novolog)  0 unit SC ACHS [facility]


   PRN Reason: Protocol


   Last Admin: 08/27/17 08:17 Dose:  6 unit


Insulin Glargine (Lantus)  16 unit SC BIDAC [facility]


   Last Admin: 08/27/17 08:16 Dose:  16 units


Lactobacillus Acidophilus (Bacid Acidophilus)  1 cap PO DAILY [facility]


   Last Admin: 08/27/17 09:07 Dose:  1 cap


Morphine Sulfate (Morphine)  1 mg IVP Q4 PRN


   PRN Reason: Pain, moderate (4-7)


   Last Admin: 08/27/17 02:19 Dose:  1 mg


Multivitamins (Hexavitamin)  1 tab PO DAILY [facility]


   Last Admin: 08/27/17 09:07 Dose:  1 tab


Mupirocin (Bactroban Ointment)  15 gm TOP BID [facility]


   Last Admin: 08/27/17 09:08 Dose:  1 applic


Pantoprazole Sodium (Protonix Ec Tab)  40 mg PO DAILY [facility]


   Last Admin: 08/27/17 09:07 Dose:  40 mg


Sertraline HCl (Zoloft)  100 mg PO DAILY [facility]


   Last Admin: 08/27/17 09:07 Dose:  100 mg











- Labs


Labs: 


 





 08/26/17 10:48 





 08/26/17 10:48 











- Constitutional


Appears: Non-toxic, No Acute Distress





- Respiratory Exam


Respiratory Exam: Clear to Ausculation Bilateral, NORMAL BREATHING PATTERN





- Cardiovascular Exam


Cardiovascular Exam: Tachycardia, +S1, +S2





- Extremities Exam


Additional comments: 





    Exam: (+) R foot wrapped in dressing, C/D/I. (+) Left stump has a small <

1cm skin irritation to the anteromedial aspect, tender to palpation, no 

fluctuance or drainage noted.











- Neurological Exam


Neurological Exam: Alert, Awake





Assessment and Plan





- Assessment and Plan (Free Text)


Assessment: 





50 yo M s/p left BKA who is here for eval of R foot 2nd digit osteomyelitis and 

left stump lesion.





P: 


- Left stump nodule likely due to chronic compression from prosthetic. Soft 

cushioning was provided.


- F/u PVR tomorrow





Further recs discuss with Dr Cesar Begum PGY2

## 2017-08-28 VITALS — RESPIRATION RATE: 20 BRPM

## 2017-08-28 RX ADMIN — INSULIN GLARGINE SCH UNITS: 100 INJECTION, SOLUTION SUBCUTANEOUS at 08:26

## 2017-08-28 RX ADMIN — INSULIN ASPART SCH UNIT: 100 INJECTION, SOLUTION INTRAVENOUS; SUBCUTANEOUS at 08:26

## 2017-08-28 RX ADMIN — INSULIN ASPART SCH: 100 INJECTION, SOLUTION INTRAVENOUS; SUBCUTANEOUS at 12:30

## 2017-08-28 RX ADMIN — Medication SCH TAB: at 09:33

## 2017-08-28 RX ADMIN — INSULIN GLARGINE SCH UNITS: 100 INJECTION, SOLUTION SUBCUTANEOUS at 18:35

## 2017-08-28 RX ADMIN — PANTOPRAZOLE SODIUM SCH MG: 40 TABLET, DELAYED RELEASE ORAL at 09:33

## 2017-08-28 RX ADMIN — INSULIN ASPART SCH UNIT: 100 INJECTION, SOLUTION INTRAVENOUS; SUBCUTANEOUS at 18:35

## 2017-08-28 RX ADMIN — Medication SCH CAP: at 09:33

## 2017-08-28 NOTE — CP.PCM.PN
Subjective





- Date & Time of Evaluation


Date of Evaluation: 08/28/17


Time of Evaluation: 08:25





- Subjective


Subjective: 


Podiatry Consult note for Dr. Maza





50 y/o male seen at bedside for right 2nd digit OM. Patient is AAOx3 and is in 

NAD. Patient denies of any pain to the 2nd digit today.Patient denies n/v/f/c/

sob/cp.





Objective





- Vital Signs/Intake and Output


Vital Signs (last 24 hours): 


 











Temp Pulse Resp BP Pulse Ox


 


 98.2 F   83   20   121/76   97 


 


 08/27/17 23:25  08/27/17 23:25  08/27/17 23:25  08/27/17 23:25  08/27/17 23:25








Intake and Output: 


 











 08/28/17 08/28/17





 06:59 18:59


 


Intake Total 700 


 


Balance 700 














- Medications


Medications: 


 Current Medications





Aspirin (Aspirin Chewable)  81 mg PO DAILY [facility]


   Last Admin: 08/27/17 09:07 Dose:  81 mg


Enalapril Maleate (Vasotec)  2.5 mg PO DAILY [facility]


   Last Admin: 08/27/17 09:11 Dose:  2.5 mg


Gabapentin (Neurontin)  100 mg PO DAILY [facility]


   Last Admin: 08/27/17 09:07 Dose:  100 mg


Daptomycin 340 mg/ Sodium (Chloride)  100 mls @ 200 mls/hr IV Q24H [facility]


   Stop: 08/30/17 20:01


   Last Admin: 08/27/17 20:02 Dose:  200 mls/hr


Insulin Aspart (Novolog)  0 unit SC ACHS [facility]


   PRN Reason: Protocol


   Last Admin: 08/27/17 22:31 Dose:  Not Given


Insulin Glargine (Lantus)  16 unit SC BIDAC [facility]


   Last Admin: 08/27/17 17:06 Dose:  16 units


Lactobacillus Acidophilus (Bacid Acidophilus)  1 cap PO DAILY [facility]


   Last Admin: 08/27/17 09:07 Dose:  1 cap


Morphine Sulfate (Morphine)  1 mg IVP Q4 PRN


   PRN Reason: Pain, moderate (4-7)


   Last Admin: 08/27/17 19:20 Dose:  1 mg


Multivitamins (Hexavitamin)  1 tab PO DAILY [facility]


   Last Admin: 08/27/17 09:07 Dose:  1 tab


Mupirocin (Bactroban Ointment)  15 gm TOP BID [facility]


   Last Admin: 08/27/17 17:10 Dose:  Not Given


Pantoprazole Sodium (Protonix Ec Tab)  40 mg PO DAILY [facility]


   Last Admin: 08/27/17 09:07 Dose:  40 mg


Sertraline HCl (Zoloft)  100 mg PO DAILY [facility]


   Last Admin: 08/27/17 09:07 Dose:  100 mg











- Labs


Labs: 


 





 08/26/17 10:48 





 08/26/17 10:48 











- Constitutional


Appears: Well, Non-toxic, No Acute Distress





- Extremities Exam


Additional comments: 


Right lower extremity focused examination:





VASC: DP and PT pulses are faintly palpable 1/4, CRT: < 3 sec to digits, TG: 

warm to cool from proximal to distal, non-pitting edema noted on the right 2nd 

digit dorsally





DERM: Ulcer measuring approximately 0.8 cm x 0.5 x 0.2 cm noted at the distal 

aspect of the 2nd digit, no active drainage, no malodor, no probe to bone, no 

undermining, wound base is fibrogranular, no erythema noted





NEURO: Protective sensation grossly diminished 





ORTHO: mild tenderness on palpation of the 2nd digit, BKA on the left 





- Neurological Exam


Neurological Exam: Alert, Awake, Oriented x3





- Psychiatric Exam


Psychiatric exam: Normal Affect, Normal Mood





Assessment and Plan





- Assessment and Plan (Free Text)


Assessment: 


51 year old male seen at bedside for right 2nd digit OM


Plan: 


Patient examined and evaluated 


Discussed with attending Dr. Maza


Labs, chart and vitals reviewed 


Dressing applied using bactroban, DSD


Follow recs per vasc


Cont IV abx per ID


Podiatry to follow while patient is in house

## 2017-08-28 NOTE — CP.PCM.PN
Subjective





- Date & Time of Evaluation


Date of Evaluation: 08/28/17


Time of Evaluation: 07:00





- Subjective


Subjective: 


VASCULAR SURGERY PROGRESS NOTE FOR DR. PAYNE





Patient seen and examined at bedside. Patient reports phantom pain in his left 

BKA stump. He has small blister on the stump. He reports pain in his right leg 

on and off. 





Objective





- Vital Signs/Intake and Output


Vital Signs (last 24 hours): 


 











Temp Pulse Resp BP Pulse Ox


 


 98.0 F   88   20   104/69   98 


 


 08/28/17 15:06  08/28/17 15:06  08/28/17 15:06  08/28/17 15:06  08/28/17 15:06








Intake and Output: 


 











 08/28/17 08/28/17





 06:59 18:59


 


Intake Total 700 500


 


Output Total  900


 


Balance 700 -400














- Medications


Medications: 


 Current Medications





Aspirin (Aspirin Chewable)  81 mg PO DAILY [facility]


   Last Admin: 08/28/17 09:33 Dose:  81 mg


Enalapril Maleate (Vasotec)  2.5 mg PO DAILY [facility]


   Last Admin: 08/28/17 09:33 Dose:  2.5 mg


Gabapentin (Neurontin)  100 mg PO DAILY [facility]


   Last Admin: 08/28/17 09:33 Dose:  100 mg


Daptomycin 340 mg/ Sodium (Chloride)  100 mls @ 200 mls/hr IV Q24H [facility]


   Stop: 08/30/17 20:01


   Last Admin: 08/27/17 20:02 Dose:  200 mls/hr


Insulin Aspart (Novolog)  0 unit SC ACHS [facility]


   PRN Reason: Protocol


   Last Admin: 08/28/17 12:30 Dose:  Not Given


Insulin Glargine (Lantus)  16 unit SC BIDAC [facility]


   Last Admin: 08/28/17 08:26 Dose:  16 units


Lactobacillus Acidophilus (Bacid Acidophilus)  1 cap PO DAILY [facility]


   Last Admin: 08/28/17 09:33 Dose:  1 cap


Morphine Sulfate (Morphine)  1 mg IVP Q4 PRN


   PRN Reason: Pain, moderate (4-7)


   Last Admin: 08/28/17 11:38 Dose:  1 mg


Multivitamins (Hexavitamin)  1 tab PO DAILY [facility]


   Last Admin: 08/28/17 09:33 Dose:  1 tab


Mupirocin (Bactroban Ointment)  15 gm TOP BID [facility]


   Last Admin: 08/28/17 09:36 Dose:  1 applic


Pantoprazole Sodium (Protonix Ec Tab)  40 mg PO DAILY [facility]


   Last Admin: 08/28/17 09:33 Dose:  40 mg


Sertraline HCl (Zoloft)  100 mg PO DAILY [facility]


   Last Admin: 08/28/17 09:33 Dose:  100 mg











- Labs


Labs: 


 





 08/26/17 10:48 





 08/26/17 10:48 











- Constitutional


Appears: Non-toxic, No Acute Distress





- Head Exam


Head Exam: ATRAUMATIC, NORMAL INSPECTION





- Eye Exam


Eye Exam: EOMI, Normal appearance





- Respiratory Exam


Respiratory Exam: NORMAL BREATHING PATTERN.  absent: Respiratory Distress





- Cardiovascular Exam


Cardiovascular Exam: +S1, +S2





- Extremities Exam


Additional comments: 


Palpable strong pulses in right DP and PT 


Dressing clean/dry/intact





- Neurological Exam


Neurological Exam: Alert, Awake, Oriented x3





- Psychiatric Exam


Psychiatric exam: Normal Affect, Normal Mood





Assessment and Plan





- Assessment and Plan (Free Text)


Assessment: 


52yo M s/p Left BKA with right foot 2nd digit osteomyelitis 





- Good strong palpable pulses in right PT and DP


- Cesar reviewed PVR. No surgical intervention necessary at this time


- Further management per podiatry team


- Discussed plan with Dr. Cesar Díaz PGY-3

## 2017-08-29 VITALS
TEMPERATURE: 98.1 F | HEART RATE: 78 BPM | DIASTOLIC BLOOD PRESSURE: 79 MMHG | OXYGEN SATURATION: 98 % | SYSTOLIC BLOOD PRESSURE: 132 MMHG

## 2017-08-29 LAB
BASOPHILS # BLD AUTO: 0.1 K/UL (ref 0–0.2)
BASOPHILS NFR BLD: 1 % (ref 0–2)
BUN SERPL-MCNC: 26 MG/DL (ref 9–20)
CALCIUM SERPL-MCNC: 9.2 MG/DL (ref 8.6–10.4)
CHLORIDE SERPL-SCNC: 104 MMOL/L (ref 98–107)
CO2 SERPL-SCNC: 23 MMOL/L (ref 22–30)
EOSINOPHIL # BLD AUTO: 0.5 K/UL (ref 0–0.7)
EOSINOPHIL NFR BLD: 6.9 % (ref 0–4)
ERYTHROCYTE [DISTWIDTH] IN BLOOD BY AUTOMATED COUNT: 14.1 % (ref 11.5–14.5)
GLUCOSE SERPL-MCNC: 147 MG/DL (ref 75–110)
HCT VFR BLD CALC: 27.6 % (ref 35–51)
LYMPHOCYTES # BLD AUTO: 2.6 K/UL (ref 1–4.3)
LYMPHOCYTES NFR BLD AUTO: 35.4 % (ref 20–40)
MCH RBC QN AUTO: 29.5 PG (ref 27–31)
MCHC RBC AUTO-ENTMCNC: 33.1 G/DL (ref 33–37)
MCV RBC AUTO: 89.1 FL (ref 80–94)
MONOCYTES # BLD: 0.4 K/UL (ref 0–0.8)
MONOCYTES NFR BLD: 5.5 % (ref 0–10)
NRBC BLD AUTO-RTO: 0 % (ref 0–2)
PLATELET # BLD: 221 K/UL (ref 130–400)
PMV BLD AUTO: 7.9 FL (ref 7.2–11.7)
POTASSIUM SERPL-SCNC: 4.5 MMOL/L (ref 3.6–5.2)
SODIUM SERPL-SCNC: 139 MMOL/L (ref 132–148)
WBC # BLD AUTO: 7.3 K/UL (ref 4.8–10.8)

## 2017-08-29 RX ADMIN — Medication SCH CAP: at 09:11

## 2017-08-29 RX ADMIN — INSULIN ASPART SCH UNIT: 100 INJECTION, SOLUTION INTRAVENOUS; SUBCUTANEOUS at 13:28

## 2017-08-29 RX ADMIN — INSULIN ASPART SCH UNIT: 100 INJECTION, SOLUTION INTRAVENOUS; SUBCUTANEOUS at 18:13

## 2017-08-29 RX ADMIN — PANTOPRAZOLE SODIUM SCH MG: 40 TABLET, DELAYED RELEASE ORAL at 09:11

## 2017-08-29 RX ADMIN — INSULIN ASPART SCH UNIT: 100 INJECTION, SOLUTION INTRAVENOUS; SUBCUTANEOUS at 09:09

## 2017-08-29 RX ADMIN — INSULIN GLARGINE SCH UNITS: 100 INJECTION, SOLUTION SUBCUTANEOUS at 17:30

## 2017-08-29 RX ADMIN — INSULIN GLARGINE SCH: 100 INJECTION, SOLUTION SUBCUTANEOUS at 09:14

## 2017-08-29 RX ADMIN — Medication SCH TAB: at 09:11

## 2017-08-29 NOTE — CP.PCM.PN
Subjective





- Date & Time of Evaluation


Date of Evaluation: 08/29/17


Time of Evaluation: 15:00





- Subjective


Subjective: 





PT D/C HOME TODAY PER DR. WILLSON.  CLEARED BY PODIATRY (DR. POWELL TO SEE PT 

PRIOR TO D/C AND THEN HE CAN GO HOME) AND ID.  DISCUSSED WITH PT PLAN FOR ABX 

UPON D/C.  PT TO BEGIN IV ABX IN [facility]ION [facility] TOMORROW MORNING AT 1000; ALL 

ARRANGEMENTS MADE BY CM PAT.  PT VERBALIZES UNDERSTANDING OF PLAN.  TO BE D/C 

WITH PICC LINE.  PT HAS HAD PICC BEFORE AND HE IS AWARE OF HOW TO CARE FOR IT.  

WILL F/U WITH DR. WILLSON IN THE OFFICE WITHIN 1 WEEK AND WITH DR. POWELL AS 

DIRECTED BY HIM AND POD RESIDENT.  NO FURTHER ORDERS. 





Objective





- Vital Signs/Intake and Output


Vital Signs (last 24 hours): 


 











Temp Pulse Resp BP Pulse Ox


 


 97.7 F   87   20   113/64   97 


 


 08/29/17 07:00  08/29/17 07:00  08/29/17 07:00  08/29/17 09:12  08/29/17 07:00








Intake and Output: 


 











 08/29/17 08/29/17





 06:59 18:59


 


Intake Total 450 700


 


Output Total 575 700


 


Balance -125 0














- Medications


Medications: 


 Current Medications





Aspirin (Aspirin Chewable)  81 mg PO DAILY [facility]


   Last Admin: 08/29/17 09:11 Dose:  81 mg


Enalapril Maleate (Vasotec)  2.5 mg PO DAILY [facility]


   Last Admin: 08/29/17 09:12 Dose:  2.5 mg


Gabapentin (Neurontin)  100 mg PO DAILY [facility]


   Last Admin: 08/29/17 09:10 Dose:  100 mg


Insulin Aspart (Novolog)  0 unit SC ACHS [facility]


   PRN Reason: Protocol


   Last Admin: 08/29/17 13:28 Dose:  2 unit


Insulin Glargine (Lantus)  16 unit SC BIDAC [facility]


   Last Admin: 08/29/17 09:14 Dose:  Not Given


Lactobacillus Acidophilus (Bacid Acidophilus)  1 cap PO DAILY [facility]


   Last Admin: 08/29/17 09:11 Dose:  1 cap


Morphine Sulfate (Morphine)  1 mg IVP Q4 PRN


   PRN Reason: Pain, moderate (4-7)


   Last Admin: 08/29/17 14:38 Dose:  1 mg


Multivitamins (Hexavitamin)  1 tab PO DAILY [facility]


   Last Admin: 08/29/17 09:11 Dose:  1 tab


Mupirocin (Bactroban Ointment)  15 gm TOP BID [facility]


   Last Admin: 08/29/17 09:12 Dose:  1 applic


Pantoprazole Sodium (Protonix Ec Tab)  40 mg PO DAILY [facility]


   Last Admin: 08/29/17 09:11 Dose:  40 mg


Sertraline HCl (Zoloft)  100 mg PO DAILY [facility]


   Last Admin: 08/29/17 09:11 Dose:  100 mg











- Labs


Labs: 


 





 08/29/17 11:34 





 08/29/17 11:34

## 2017-08-29 NOTE — PN
DATE:  08/28/2017



DAILY PROGRESS NOTE



SUBJECTIVE:  The patient is seen today, 08/28/2017.



PHYSICAL EXAMINATION:

VITAL SIGNS:  He is not in any cardiopulmonary distress with blood pressure

of 113/71, temperature of 97.8, respiratory rate 18, and pulses 88.

HEENT:  Pupils are equal and reactive to light.  Normal appearing mucosa of

the conjunctivae, oropharynx, and nasal membrane mucosa.

NECK:  Supple.  No JVD.  No carotid bruit.  No lymph node.  No thyromegaly.

CHEST AND LUNGS:  Bilateral symmetrical expansion.  Good air exchange.  No

rales.  No rhonchi.

CARDIOVASCULAR:  PMI not localized.  S1 and S2.  No additional sounds.

ABDOMEN:  Normoactive bowel sounds.  No tenderness.  No organomegaly.  No

masses.

EXTREMITIES:  Left BKA and the right foot is surgically dressed.  No edema.

CENTRAL NERVOUS SYSTEM:  Alert, awake, and oriented x3.  No neurological

deficit could be appreciated.



ASSESSMENT:

1.  Osteomyelitis of the right foot.

2.  Type 2 diabetes mellitus.

3.  Chronic kidney disease.

4.  Anemia of chronic disease.

5.  Peripheral vascular disease, status post left below-knee amputation.



PLAN:  Follow surgical recommendations and plan to discharge the patient on

a daily dose daptomycin as ordered by ID guided by all the factors

mentioned before.





__________________________________________

Liliane Brito MD





DD:  08/28/2017 23:26:27

DT:  08/28/2017 23:51:58

Job # [id number]

## 2017-08-29 NOTE — CP.PCM.PN
<Nasreen Melton - Last Filed: 08/29/17 11:20>





Subjective





- Date & Time of Evaluation


Date of Evaluation: 08/29/17


Time of Evaluation: 10:23





- Subjective


Subjective: 


Podiatry Consult note for Dr. Maza





51 year old male seen at bedside resting comfortably regarding right 2nd digit 

OM. Patient NAD. Patient admits to intermittent pain of the right 2nd 

digit.Patient denies n/v/f/c/sob/cp.





Objective





- Vital Signs/Intake and Output


Vital Signs (last 24 hours): 


 











Temp Pulse Resp BP Pulse Ox


 


 97.7 F   87   20   113/64   97 


 


 08/29/17 07:00  08/29/17 07:00  08/29/17 07:00  08/29/17 09:12  08/29/17 07:00








Intake and Output: 


 











 08/29/17 08/29/17





 06:59 18:59


 


Intake Total 450 


 


Output Total 575 


 


Balance -125 














- Medications


Medications: 


 Current Medications





Aspirin (Aspirin Chewable)  81 mg PO DAILY [facility]


   Last Admin: 08/29/17 09:11 Dose:  81 mg


Enalapril Maleate (Vasotec)  2.5 mg PO DAILY [facility]


   Last Admin: 08/29/17 09:12 Dose:  2.5 mg


Gabapentin (Neurontin)  100 mg PO DAILY [facility]


   Last Admin: 08/29/17 09:10 Dose:  100 mg


Daptomycin 340 mg/ Sodium (Chloride)  100 mls @ 200 mls/hr IV Q24H [facility]


   Stop: 08/30/17 20:01


   Last Admin: 08/28/17 19:55 Dose:  200 mls/hr


Insulin Aspart (Novolog)  0 unit SC ACHS [facility]


   PRN Reason: Protocol


   Last Admin: 08/29/17 09:09 Dose:  3 unit


Insulin Glargine (Lantus)  16 unit SC BIDAC [facility]


   Last Admin: 08/29/17 09:14 Dose:  Not Given


Lactobacillus Acidophilus (Bacid Acidophilus)  1 cap PO DAILY [facility]


   Last Admin: 08/29/17 09:11 Dose:  1 cap


Morphine Sulfate (Morphine)  1 mg IVP Q4 PRN


   PRN Reason: Pain, moderate (4-7)


   Last Admin: 08/29/17 00:50 Dose:  1 mg


Multivitamins (Hexavitamin)  1 tab PO DAILY [facility]


   Last Admin: 08/29/17 09:11 Dose:  1 tab


Mupirocin (Bactroban Ointment)  15 gm TOP BID [facility]


   Last Admin: 08/29/17 09:12 Dose:  1 applic


Pantoprazole Sodium (Protonix Ec Tab)  40 mg PO DAILY [facility]


   Last Admin: 08/29/17 09:11 Dose:  40 mg


Sertraline HCl (Zoloft)  100 mg PO DAILY [facility]


   Last Admin: 08/29/17 09:11 Dose:  100 mg











- Labs


Labs: 


 





 08/26/17 10:48 





 08/26/17 10:48 











- Constitutional


Appears: Well, Non-toxic, No Acute Distress





- Extremities Exam


Additional comments: 


Right lower extremity focused examination:





VASC: DP and PT pulses are faintly palpable 1/4, CRT: < 3 sec to digits, TG: 

warm to cool from proximal to distal, non-pitting edema noted on the right 2nd 

digit dorsally





DERM: Ulcer measuring approximately 0.8 cm x 0.5 x 0.2 cm noted at the distal 

aspect of the 2nd digit, no active drainage, no malodor, no probe to bone, no 

undermining, wound base is fibrogranular, no erythema noted





NEURO: Protective sensation grossly diminished 





ORTHO: mild tenderness on palpation of the 2nd digit, BKA on the left 





- Neurological Exam


Neurological Exam: Alert, Awake, Oriented x3





- Psychiatric Exam


Psychiatric exam: Normal Affect, Normal Mood





Assessment and Plan





- Assessment and Plan (Free Text)


Assessment: 


51 year old male seen at bedside for right 2nd digit OM


Plan: 


Patient examined and evaluated 


Discussed with attending Dr. Maza


Labs, chart and vitals reviewed;afebrile


Dressing applied using bactroban, DSD


Cont IV abx per ID


Podiatry to follow while patient is in house





<Pacheco Maza - Last Filed: 08/29/17 16:46>





Objective





- Vital Signs/Intake and Output


Vital Signs (last 24 hours): 


 











Temp Pulse Resp BP Pulse Ox


 


 98.1 F   78   20   132/79   98 


 


 08/29/17 16:05  08/29/17 16:05  08/29/17 16:05  08/29/17 16:05  08/29/17 16:05








Intake and Output: 


 











 08/29/17 08/29/17





 06:59 18:59


 


Intake Total 450 700


 


Output Total 575 700


 


Balance -125 0














- Medications


Medications: 


 Current Medications





Aspirin (Aspirin Chewable)  81 mg PO DAILY [facility]


   Last Admin: 08/29/17 09:11 Dose:  81 mg


Enalapril Maleate (Vasotec)  2.5 mg PO DAILY [facility]


   Last Admin: 08/29/17 09:12 Dose:  2.5 mg


Gabapentin (Neurontin)  100 mg PO DAILY [facility]


   Last Admin: 08/29/17 09:10 Dose:  100 mg


Insulin Aspart (Novolog)  0 unit SC ACHS [facility]


   PRN Reason: Protocol


   Last Admin: 08/29/17 13:28 Dose:  2 unit


Insulin Glargine (Lantus)  16 unit SC BIDAC [facility]


   Last Admin: 08/29/17 09:14 Dose:  Not Given


Lactobacillus Acidophilus (Bacid Acidophilus)  1 cap PO DAILY [facility]


   Last Admin: 08/29/17 09:11 Dose:  1 cap


Morphine Sulfate (Morphine)  1 mg IVP Q4 PRN


   PRN Reason: Pain, moderate (4-7)


   Last Admin: 08/29/17 14:38 Dose:  1 mg


Multivitamins (Hexavitamin)  1 tab PO DAILY [facility]


   Last Admin: 08/29/17 09:11 Dose:  1 tab


Mupirocin (Bactroban Ointment)  15 gm TOP BID [facility]


   Last Admin: 08/29/17 09:12 Dose:  1 applic


Pantoprazole Sodium (Protonix Ec Tab)  40 mg PO DAILY [facility]


   Last Admin: 08/29/17 09:11 Dose:  40 mg


Sertraline HCl (Zoloft)  100 mg PO DAILY [facility]


   Last Admin: 08/29/17 09:11 Dose:  100 mg











- Labs


Labs: 


 





 08/29/17 11:34 





 08/29/17 11:34 











Assessment and Plan





- Assessment and Plan (Free Text)


Plan: 


Pt seen at bedside .Toe 2 right improving .Needs 6 wks antibiotics .


Agree with above findings .DR Maza .

## 2017-08-29 NOTE — VASCLAB
STUDY DESCRIPTION:  



HISTORY:

ulcer B/L LE  



PRIORS:

None. 



TECHNIQUE:

Pulse volume recording waveforms and segmental pressures of right 

lower extremities at multiple levels were obtained. Ankle Brachial 

Indices (ABIs) were calculated.



Report prepared by   JAYA Lemons, RVT



RIGHT LOWER EXTREMITY:  

* Brachial artery: Pressure -  mmHg.  



* High thigh: Pressure - 142 mmHg: Ratio - 1.28: PVR waveform -  

Pulsatile



* Low thigh: Pressure - 151 mmHg: Ratio -  1.36 PVR waveform:  

Pulsatile



* Calf: Pressure - 152 mmHg: Ratio - 1.37 PVR waveform:  Pulsatile



* Posterior tibial Artery: Pressure - 151 mmHg: Ratio - 1.36 PVR 

waveform:  Pulsatile 



* Dorsalis pedis Artery: Pressure - 140 mmHg: Ratio - 1.26 PVR 

waveform:  Pulsatile



* Great toe: Pressure -  mmHg: Ratio -  PVR waveform:  



Ankle brachial index (JAJA): 1.36



OTHER FINDINGS:  LEFT Brachial artery: Pressure - 111 mmHg.



IMPRESSION:

Right: The ankle pressure index of the right lower extremity is 

non-diagnostic due to possible arterial wall calcifications. 

Waveforms are pulsatile. Recommend CT angiogram.

## 2017-08-29 NOTE — CP.PCM.PN
Subjective





- Date & Time of Evaluation


Date of Evaluation: 08/29/17


Time of Evaluation: 16:47





- Subjective


Subjective: 





Pt seen on rounds for continued treatment of OM toe 2 right foot .No complaints 

this PM .





Objective





- Vital Signs/Intake and Output


Vital Signs (last 24 hours): 


 











Temp Pulse Resp BP Pulse Ox


 


 98.1 F   78   20   132/79   98 


 


 08/29/17 16:05  08/29/17 16:05  08/29/17 16:05  08/29/17 16:05  08/29/17 16:05








Intake and Output: 


 











 08/29/17 08/29/17





 06:59 18:59


 


Intake Total 450 700


 


Output Total 575 700


 


Balance -125 0














- Medications


Medications: 


 Current Medications





Aspirin (Aspirin Chewable)  81 mg PO DAILY [facility]


   Last Admin: 08/29/17 09:11 Dose:  81 mg


Enalapril Maleate (Vasotec)  2.5 mg PO DAILY [facility]


   Last Admin: 08/29/17 09:12 Dose:  2.5 mg


Gabapentin (Neurontin)  100 mg PO DAILY [facility]


   Last Admin: 08/29/17 09:10 Dose:  100 mg


Insulin Aspart (Novolog)  0 unit SC ACHS [facility]


   PRN Reason: Protocol


   Last Admin: 08/29/17 13:28 Dose:  2 unit


Insulin Glargine (Lantus)  16 unit SC BIDAC [facility]


   Last Admin: 08/29/17 09:14 Dose:  Not Given


Lactobacillus Acidophilus (Bacid Acidophilus)  1 cap PO DAILY [facility]


   Last Admin: 08/29/17 09:11 Dose:  1 cap


Morphine Sulfate (Morphine)  1 mg IVP Q4 PRN


   PRN Reason: Pain, moderate (4-7)


   Last Admin: 08/29/17 14:38 Dose:  1 mg


Multivitamins (Hexavitamin)  1 tab PO DAILY [facility]


   Last Admin: 08/29/17 09:11 Dose:  1 tab


Mupirocin (Bactroban Ointment)  15 gm TOP BID [facility]


   Last Admin: 08/29/17 09:12 Dose:  1 applic


Pantoprazole Sodium (Protonix Ec Tab)  40 mg PO DAILY [facility]


   Last Admin: 08/29/17 09:11 Dose:  40 mg


Sertraline HCl (Zoloft)  100 mg PO DAILY [facility]


   Last Admin: 08/29/17 09:11 Dose:  100 mg











- Labs


Labs: 


 





 08/29/17 11:34 





 08/29/17 11:34 











- Extremities Exam


Additional comments: 





O/Ulcer distal aspect toe 2 improving with granular tissue noted right foot.


   OM toe 2 right foot .Vascular status intact right foot 


   DM neuropathy right foot .





Assessment and Plan





- Assessment and Plan (Free Text)


Assessment: 





A/DM ulcer toe 2 right foot with OM .


Plan: 


P/Bactroban dressing applied right foot .


   Pt will recieve IV Cubicin as outpt x 6 wks .

## 2017-08-30 NOTE — DS
REASON FOR ADMISSION:  This is a 56-year-old white male with history of

multiple medical problems including insulin dependent diabetes mellitus,

was admitted for osteomyelitis of the right foot.



COURSE OF HOSPITALIZATION:  The patient was admitted to medical floor and

he has a podiatry consult done by Dr. Maza.  The patient has also vascular

surgery consult done by Dr. Guerrero.  The patient was started on

daptomycin and the patient already had a PICC line and plan was to

discharge the patient home, to get the daptomycin daily in the infusion

center.



FINAL DIAGNOSES:

1.  Osteomyelitis of the right foot.

2.  Status post left below-knee amputation.

3.  Insulin dependent diabetes mellitus.







__________________________________________

[facility] MD Daryl





DD:  08/29/2017 21:24:55

DT:  08/30/2017 3:28:34

Job # [id number]

## 2017-12-15 ENCOUNTER — HOSPITAL ENCOUNTER (EMERGENCY)
Dept: HOSPITAL 31 - C.ER | Age: 52
Discharge: HOME | End: 2017-12-15
Payer: MEDICARE

## 2017-12-15 VITALS — SYSTOLIC BLOOD PRESSURE: 132 MMHG | DIASTOLIC BLOOD PRESSURE: 76 MMHG | HEART RATE: 88 BPM | OXYGEN SATURATION: 99 %

## 2017-12-15 VITALS — TEMPERATURE: 98.1 F | RESPIRATION RATE: 20 BRPM

## 2017-12-15 VITALS — BODY MASS INDEX: 26.8 KG/M2

## 2017-12-15 DIAGNOSIS — Z45.2: Primary | ICD-10-CM

## 2017-12-15 NOTE — C.PDOC
History Of Present Illness


51 y/o male sent to ED by PMD, Dr. Arguello, to have PICC line removed. Pt presents 

with Rx from Dr. Arguello for PICC line removal. Pt denies any complaints at this 

time. 


Time Seen by Provider: 12/15/17 14:00


Chief Complaint (Nursing): Medical Clearance


History Per: Patient


History/Exam Limitations: no limitations





Past Medical History


Reviewed: Historical Data, Nursing Documentation, Vital Signs


Vital Signs: 


 Last Vital Signs











Temp  98.1 F   12/15/17 15:29


 


Pulse  88   12/15/17 15:29


 


Resp  20   12/15/17 15:29


 


BP  132/76   12/15/17 15:29


 


Pulse Ox  99   12/15/17 16:26














- Medical History


PMH: Anemia, Depression, Diabetes, Fractures (Broke hip 2014 or 2015)


   Denies: Crohn's Disease, HTN (PT DENIES), Chronic Kidney Disease





- [facility] Procedures








CLOSED RED-INT FIX FEMUR (06/24/14)


FLUOROSCOPY OF SUPERIOR VENA CAVA, GUIDANCE (10/26/16)


INSERTION OF INFUSION DEV INTO SUP VENA CAVA, PERC APPROACH (11/05/17)








Family History: States: Unknown Family Hx





- Social History


Hx Tobacco Use: Yes


Hx Alcohol Use: No


Hx Substance Use: No





- Immunization History


Hx Tetanus Toxoid Vaccination: No


Hx Influenza Vaccination: No


Hx Pneumococcal Vaccination: No





Review Of Systems


Except As Marked, All Systems Reviewed And Found Negative.


Constitutional: Negative for: Fever, Chills


Cardiovascular: Negative for: Chest Pain, Palpitations


Respiratory: Negative for: Cough, Shortness of Breath


Gastrointestinal: Negative for: Nausea, Vomiting, Abdominal Pain





Physical Exam





- Physical Exam


Appears: Non-toxic, No Acute Distress


Skin: Normal Color, Warm, Dry


Head: Atraumatic, Normacephalic


Eye(s): bilateral: Normal Inspection


Oral Mucosa: Moist


Chest: Symmetrical


Cardiovascular: Rhythm Regular, No Murmur


Respiratory: Normal Breath Sounds, No Rales, No Rhonchi, No Wheezing


Gastrointestinal/Abdominal: Soft, No Tenderness


Extremity: Normal ROM, Other (PICC line in left arm)


Neurological/Psych: Oriented x3, Normal Speech





ED Course And Treatment


O2 Sat by Pulse Oximetry: 99


Pulse Ox Interpretation: Normal


Progress Note: PICC line removed, without any complications. Pt tolerated 

procedure well.





Disposition





- Disposition


Referrals: 


Ginny Arguello MD [Staff Provider] - 


Disposition: HOME/ ROUTINE


Disposition Time: 15:00


Condition: GOOD


Additional Instructions: 





Thank you for letting us take care of you today. The emergency medical care you 

received today was directed at your acute symptoms. If you were prescribed any 

medication, please fill it and take as directed. It may take several days for 

your symptoms to resolve. Return to the Emergency Department if your symptoms 

worsen, do not improve, or if you have any other problems.





Please contact your doctor or call one of the physicians/clinics you have been 

referred to that are listed on the Patient Visit Information form that is 

included in your discharge packet. Bring any paperwork you were given at 

discharge with you along with any medications you are taking to your follow up 

visit. Our treatment cannot replace ongoing medical care by a primary care 

provider (PCP) outside of the emergency department.





Thank you for allowing the [facility] team to be part of your care today.











If the area becomes red and/or warm to the touch, please return to the 

emergency room as soon as possible.








Follow up with your Dr. Argulelo in 2-3 days for follow up.





Forms:  [facility] (English)





- Clinical Impression


Clinical Impression: 


 PIC line (peripherally inserted central catheter) removal








- Scribe Statement


The provider has reviewed the documentation as recorded by the Scribe


Anabella Gary





All medical record entries made by the Scribe were at my direction and 

personally dictated by me. I have reviewed the chart and agree that the record 

accurately reflects my personal performance of the history, physical exam, 

medical decision making, and the department course for this patient. I have 

also personally directed, reviewed, and agree with the discharge instructions 

and disposition.

## 2018-01-21 ENCOUNTER — HOSPITAL ENCOUNTER (EMERGENCY)
Dept: HOSPITAL 31 - C.ER | Age: 53
Discharge: HOME | End: 2018-01-21
Payer: MEDICARE

## 2018-01-21 VITALS — BODY MASS INDEX: 26.8 KG/M2

## 2018-01-21 VITALS
SYSTOLIC BLOOD PRESSURE: 113 MMHG | HEART RATE: 67 BPM | RESPIRATION RATE: 18 BRPM | DIASTOLIC BLOOD PRESSURE: 76 MMHG | TEMPERATURE: 97.6 F

## 2018-01-21 VITALS — OXYGEN SATURATION: 98 %

## 2018-01-21 DIAGNOSIS — M79.1: Primary | ICD-10-CM

## 2018-01-21 LAB
ALBUMIN SERPL-MCNC: 4.4 G/DL (ref 3.5–5)
ALBUMIN/GLOB SERPL: 1.1 {RATIO} (ref 1–2.1)
ALT SERPL-CCNC: 26 U/L (ref 21–72)
AST SERPL-CCNC: 20 U/L (ref 17–59)
BACTERIA #/AREA URNS HPF: (no result) /[HPF]
BASOPHILS # BLD AUTO: 0.1 K/UL (ref 0–0.2)
BASOPHILS NFR BLD: 0.8 % (ref 0–2)
BILIRUB UR-MCNC: NEGATIVE MG/DL
BUN SERPL-MCNC: 33 MG/DL (ref 9–20)
CALCIUM SERPL-MCNC: 9.5 MG/DL (ref 8.6–10.4)
EOSINOPHIL # BLD AUTO: 0.7 K/UL (ref 0–0.7)
EOSINOPHIL NFR BLD: 8 % (ref 0–4)
ERYTHROCYTE [DISTWIDTH] IN BLOOD BY AUTOMATED COUNT: 15.1 % (ref 11.5–14.5)
GFR NON-AFRICAN AMERICAN: 25
GLUCOSE UR STRIP-MCNC: (no result) MG/DL
HGB BLD-MCNC: 10.5 G/DL (ref 12–18)
HYALINE CASTS #/AREA URNS LPF: (no result) /LPF (ref 0–2)
LEUKOCYTE ESTERASE UR-ACNC: (no result) LEU/UL
LYMPHOCYTES # BLD AUTO: 2.3 K/UL (ref 1–4.3)
LYMPHOCYTES NFR BLD AUTO: 27.3 % (ref 20–40)
MCH RBC QN AUTO: 29.2 PG (ref 27–31)
MCHC RBC AUTO-ENTMCNC: 33.4 G/DL (ref 33–37)
MCV RBC AUTO: 87.2 FL (ref 80–94)
MONOCYTES # BLD: 0.4 K/UL (ref 0–0.8)
MONOCYTES NFR BLD: 4.6 % (ref 0–10)
NEUTROPHILS # BLD: 4.9 K/UL (ref 1.8–7)
NEUTROPHILS NFR BLD AUTO: 59.3 % (ref 50–75)
NRBC BLD AUTO-RTO: 0 % (ref 0–2)
PH UR STRIP: 5 [PH] (ref 5–8)
PLATELET # BLD: 281 K/UL (ref 130–400)
PMV BLD AUTO: 8.1 FL (ref 7.2–11.7)
PROT UR STRIP-MCNC: (no result) MG/DL
RBC # BLD AUTO: 3.61 MIL/UL (ref 4.4–5.9)
RBC # UR STRIP: NEGATIVE /UL
SP GR UR STRIP: 1.02 (ref 1–1.03)
URINE NITRATE: NEGATIVE
UROBILINOGEN UR-MCNC: NORMAL MG/DL (ref 0.2–1)
WBC # BLD AUTO: 8.3 K/UL (ref 4.8–10.8)

## 2018-01-21 PROCEDURE — 71046 X-RAY EXAM CHEST 2 VIEWS: CPT

## 2018-01-21 PROCEDURE — 85025 COMPLETE CBC W/AUTO DIFF WBC: CPT

## 2018-01-21 PROCEDURE — 81001 URINALYSIS AUTO W/SCOPE: CPT

## 2018-01-21 PROCEDURE — 80053 COMPREHEN METABOLIC PANEL: CPT

## 2018-01-21 PROCEDURE — 87804 INFLUENZA ASSAY W/OPTIC: CPT

## 2018-01-21 PROCEDURE — 84132 ASSAY OF SERUM POTASSIUM: CPT

## 2018-01-21 PROCEDURE — 82948 REAGENT STRIP/BLOOD GLUCOSE: CPT

## 2018-01-21 PROCEDURE — 99284 EMERGENCY DEPT VISIT MOD MDM: CPT

## 2018-01-21 PROCEDURE — 36415 COLL VENOUS BLD VENIPUNCTURE: CPT

## 2018-01-21 NOTE — C.PDOC
History Of Present Illness


52 year old male presents to the ED with complaints of vague bilateral lower 

extremity, upper extremity, back, and neck pain beginning this morning. Patient 

also notes feeling lightheaded. Patient is poor historian. Patient denies fever

, chills, shortness of breath, chest pain, trauma, heavy lifting, or other 

complaints at this time. 


Time Seen by Provider: 18 17:19


Chief Complaint (Nursing): Back Pain


History Per: Patient


History/Exam Limitations: other (vague complaints, poor historian )


Onset/Duration Of Symptoms: Hrs


Current Symptoms Are (Timing): Still Present


Quality Of Discomfort: "Pain"


Associated Symptoms: None


Recent travel outside of the [place]: No





Past Medical History


Reviewed: Historical Data, Nursing Documentation, Vital Signs


Vital Signs: 


 Last Vital Signs











Temp  97.6 F   18 21:17


 


Pulse  67   18 21:17


 


Resp  18   18 21:17


 


BP  113/76   18 21:17


 


Pulse Ox  98   18 22:01














- Medical History


PMH: Anemia, Depression, Diabetes, Fractures (Broke hip  or )


   Denies: Crohn's Disease, HTN (PT DENIES)





- [facility] Procedures








CLOSED RED-INT FIX FEMUR (14)


FLUOROSCOPY OF SUPERIOR VENA CAVA, GUIDANCE (10/26/16)


INSERTION OF INFUSION DEV INTO SUP VENA CAVA, PERC APPROACH (17)








Family History: States: Unknown Family Hx





- Social History


Hx Tobacco Use: Yes


Hx Alcohol Use: No


Hx Substance Use: No





- Immunization History


Hx Tetanus Toxoid Vaccination: No


Hx Influenza Vaccination: No


Hx Pneumococcal Vaccination: No





Review Of Systems


Constitutional: Negative for: Fever, Chills


Cardiovascular: Positive for: Light Headedness.  Negative for: Chest Pain, 

Palpitations


Respiratory: Negative for: Cough, Shortness of Breath


Gastrointestinal: Negative for: Nausea, Vomiting, Abdominal Pain


Musculoskeletal: Positive for: Neck Pain, Arm Pain, Back Pain, Leg Pain


Neurological: Negative for: Headache





Physical Exam





- Physical Exam


Appears: Non-toxic, No Acute Distress


Skin: Warm, Dry, No Rash


Head: Atraumatic, Normacephalic, No Tenderness


Eye(s): bilateral: Normal Inspection, PERRL, EOMI


Oral Mucosa: Moist


Teeth: Edentulous


Neck: Normal ROM, Supple


Chest: Symmetrical, No Deformity


Cardiovascular: Rhythm Regular, No Murmur


Respiratory: No Rales, No Rhonchi, No Wheezing, Other (clear to auscultation 

bilaterally )


Back: No Paraspinal Tenderness, Other (mild bilateral trapezious tenderness )


Extremity: Normal ROM, No Tenderness, No Calf Tenderness, Capillary Refill (<2 

seconds ), No Deformity, No Swelling


Pulses: Left Radial: Normal, Right Radial: Normal, Left Dorsalis Pedis: Normal, 

Right Dorsalis Pedis: Normal


Neurological/Psych: Oriented x3, Normal Speech, Normal Cognition, Normal Motor, 

Normal Sensation





ED Course And Treatment





- Laboratory Results


Result Diagrams: 


 18 18:22





 18 20:38


ECG: Interpreted By Me, Viewed By Me


ECG Rhythm: Sinus Rhythm


Rate From EC


O2 Sat by Pulse Oximetry: 98 (RA)


Pulse Ox Interpretation: Normal


Progress Note: Patient was given Tylenol and IV fluids. UA and blood work were 

ordered.





Medical Decision Making


Medical Decision Making: 





mesage left with Dr Brito's service





Spoke to Dr. Brito at  regarding case., requesting cxr, influenza, repeat 

potassium. 





945 pm discussed with Dr Brito, will d/c patient with outpatient f/u





Disposition


Discussed With Dr.: Liliane Brito


Doctor Will See Patient In The: Office


Counseled Patient/Family Regarding: Studies Performed, Diagnosis, Need For 

Followup





- Disposition


Referrals: 


Liliane Brito MD [Staff Provider] - 


Disposition: HOME/ ROUTINE


Disposition Time: 21:59


Condition: STABLE


Additional Instructions: 


Take Tylenol for pain if needed. Follow up wiht Dr Brito in a few days. Return 

to ER for any worsening symptoms. 


Prescriptions: 


Acetaminophen [Tylenol 325mg tab] 650 mg PO Q6 #30 tab


Forms:  [facility] (English), General Discharge Instructions





- Clinical Impression


Clinical Impression: 


 Musculoskeletal pain








- PA / NP / Resident Statement


MD/DO has reviewed & agrees with the documentation as recorded.





- Scribe Statement


The provider has reviewed the documentation as recorded by the Edyibykara Bregeron





All medical record entries made by the Scribe were at my direction and 

personally dictated by me. I have reviewed the chart and agree that the record 

accurately reflects my personal performance of the history, physical exam, 

medical decision making, and the department course for this patient. I have 

also personally directed, reviewed, and agree with the discharge instructions 

and disposition.

## 2018-01-22 NOTE — RAD
Chest x-ray two views 



History: Hypointensity. 



Comparison: 11/05/2017 



Findings: 



No focal infiltrate or effusion. 



Heart size within normal limits. 



Degenerative changes in the spine. 



Impression: 



No focal infiltrate or effusion.

## 2018-01-27 NOTE — CARD
--------------- APPROVED REPORT --------------





EKG Measurement

Heart [id number]

IA [id number]

[id number]

[id number]

[id number]



<Conclusion>

Normal sinus rhythm

Normal ECG

## 2018-07-27 ENCOUNTER — HOSPITAL ENCOUNTER (EMERGENCY)
Dept: HOSPITAL 31 - C.ER | Age: 53
Discharge: HOME | End: 2018-07-27
Payer: MEDICARE

## 2018-07-27 VITALS
RESPIRATION RATE: 17 BRPM | DIASTOLIC BLOOD PRESSURE: 70 MMHG | HEART RATE: 70 BPM | OXYGEN SATURATION: 100 % | SYSTOLIC BLOOD PRESSURE: 125 MMHG | TEMPERATURE: 98.1 F

## 2018-07-27 VITALS — BODY MASS INDEX: 26.8 KG/M2

## 2018-07-27 DIAGNOSIS — H61.23: Primary | ICD-10-CM

## 2018-07-27 NOTE — C.PDOC
History Of Present Illness





51 yo male come in for evaluation of B/L earache gradually developed for past 3-

4 days. Pt reports, was seen by PMD and received ear drops Rx: Debrox without 

significant improvement. Pt reports, pain over Left ear worsen. Otherwise, pt 

denies fever, chills, headache, dizziness, vertigo, ear discharges, sore throat

, neck pain, denies any other active complaints. Pt request ear irrigation for 

cerumen removal. Ambulate to ED, not in any apparent distress.


Time Seen by Provider: 07/27/18 19:14


Chief Complaint (Nursing): ENT Problem


History Per: Patient





Past Medical History


Reviewed: Historical Data, Nursing Documentation, Vital Signs


Vital Signs: 


 Last Vital Signs











Temp  98.1 F   07/27/18 19:03


 


Pulse  70   07/27/18 19:03


 


Resp  17   07/27/18 19:03


 


BP  125/70   07/27/18 19:03


 


Pulse Ox  100   07/27/18 19:29














- Medical History


PMH: Anemia, Depression, Diabetes, Fractures (Broke hip 2014 or 2015)


   Denies: Crohn's Disease, HTN (PT DENIES), Chronic Kidney Disease





- [facility] Procedures








CLOSED RED-INT FIX FEMUR (06/24/14)


FLUOROSCOPY OF SUPERIOR VENA CAVA, GUIDANCE (10/26/16)


INSERTION OF INFUSION DEV INTO SUP VENA CAVA, PERC APPROACH (11/05/17)








Family History: States: Unknown Family Hx





- Social History


Hx Tobacco Use: Yes


Hx Alcohol Use: No


Hx Substance Use: No





- Immunization History


Hx Tetanus Toxoid Vaccination: No


Hx Influenza Vaccination: No


Hx Pneumococcal Vaccination: No





Review Of Systems


Except As Marked, All Systems Reviewed And Found Negative.


Constitutional: Negative for: Fever, Chills


Eyes: Negative for: Vision Change


ENT: Positive for: Ear Pain.  Negative for: Ear Discharge, Nose Discharge, Nose 

Congestion, Throat Pain, Throat Swelling


Cardiovascular: Negative for: Chest Pain


Respiratory: Negative for: Cough, Shortness of Breath, Wheezing


Gastrointestinal: Negative for: Vomiting, Abdominal Pain, Diarrhea


Musculoskeletal: Negative for: Neck Pain


Skin: Negative for: Rash


Neurological: Negative for: Headache, Dizziness





Physical Exam





- Physical Exam


Appears: Well, Non-toxic, No Acute Distress


Skin: Normal Color, Warm, No Rash, No Ecchymosis


Head: Normacephalic


Eye(s): bilateral: PERRL


Ear(s): Bilateral: TM Obscured By Wax, Other (no ear canal edema or discharge 

noted. No mastoid tenderness/edema/erythema B/L)


Nose: No Flaring, No Discharge


Oral Mucosa: Moist, No Drooling


Tongue: Normal Appearing


Lips: Normal Appearing


Throat: No Erythema, No Exudate, No Drooling


Neck: Trachea Midline, Supple


Lymphatic: No Adenopathy (cervical B/L)


Neurological/Psych: Oriented x3, Normal Speech





ED Course And Treatment


O2 Sat by Pulse Oximetry: 100


Pulse Ox Interpretation: Normal


Progress Note: On re-eval, pt is afebrile, hemodynamicaly stable.  Non-toxic.  

PulseOx 100% RA.  ENT: exam c/w B/L cerumen obstruction. No mastoid tenderness.

  neck: SUpple, (-) JVD.  Neurologicaly intact.  Procedure: ear irrigation with 

sterile water of B/L ears attempted with partial cerumen removal, mild 

improvemnet in sx. No ear canal edema or erythema, no mastoid tenderness.  Pt 

advised to cont. use Debrox and F/u with ENT in 1-2 days for re-eval.  return 

to ED if any worsening or new changes.





Disposition


Counseled Patient/Family Regarding: Diagnosis, Need For Followup





- Disposition


Referrals: 


Behin,Babak, MD [Staff Provider] - 


Disposition: HOME/ ROUTINE


Disposition Time: 19:45


Condition: STABLE


Additional Instructions: 


Continue use Debrox ear drops for 2-3 days


Follow up with ENT in 1-2 days for re-evaluation and cerumen removal.


return to ED if any worsening or new changes.


Instructions:  Ear Wax Impaction


Forms:  [email or web address] (English)





- Clinical Impression


Clinical Impression: 


 Cerumen impaction

## 2018-08-27 ENCOUNTER — HOSPITAL ENCOUNTER (INPATIENT)
Dept: HOSPITAL 31 - C.ER | Age: 53
LOS: 3 days | Discharge: HOME HEALTH | DRG: 603 | End: 2018-08-30
Payer: MEDICARE

## 2018-08-27 VITALS — RESPIRATION RATE: 20 BRPM

## 2018-08-27 VITALS — BODY MASS INDEX: 23.2 KG/M2

## 2018-08-27 DIAGNOSIS — Z89.612: ICD-10-CM

## 2018-08-27 DIAGNOSIS — E11.65: ICD-10-CM

## 2018-08-27 DIAGNOSIS — E11.40: ICD-10-CM

## 2018-08-27 DIAGNOSIS — L03.115: Primary | ICD-10-CM

## 2018-08-27 DIAGNOSIS — Z79.4: ICD-10-CM

## 2018-08-27 DIAGNOSIS — D63.8: ICD-10-CM

## 2018-08-27 DIAGNOSIS — N18.3: ICD-10-CM

## 2018-08-27 DIAGNOSIS — Z97.14: ICD-10-CM

## 2018-08-27 DIAGNOSIS — E11.22: ICD-10-CM

## 2018-08-27 DIAGNOSIS — F17.200: ICD-10-CM

## 2018-08-27 LAB
ALBUMIN SERPL-MCNC: 4.4 G/DL (ref 3.5–5)
ALBUMIN/GLOB SERPL: 1.4 {RATIO} (ref 1–2.1)
ALT SERPL-CCNC: 18 U/L (ref 21–72)
AST SERPL-CCNC: 15 U/L (ref 17–59)
BASOPHILS # BLD AUTO: 0 K/UL (ref 0–0.2)
BASOPHILS NFR BLD: 0.7 % (ref 0–2)
BUN SERPL-MCNC: 37 MG/DL (ref 9–20)
CALCIUM SERPL-MCNC: 9.2 MG/DL (ref 8.6–10.4)
EOSINOPHIL # BLD AUTO: 0.7 K/UL (ref 0–0.7)
EOSINOPHIL NFR BLD: 10.9 % (ref 0–4)
ERYTHROCYTE [DISTWIDTH] IN BLOOD BY AUTOMATED COUNT: 14.3 % (ref 11.5–14.5)
ERYTHROCYTE [SEDIMENTATION RATE] IN BLOOD: 87 MM/HR (ref 0–15)
GFR NON-AFRICAN AMERICAN: 30
HGB BLD-MCNC: 8.4 G/DL (ref 12–18)
LYMPHOCYTES # BLD AUTO: 1.8 K/UL (ref 1–4.3)
LYMPHOCYTES NFR BLD AUTO: 29.6 % (ref 20–40)
MCH RBC QN AUTO: 30.3 PG (ref 27–31)
MCHC RBC AUTO-ENTMCNC: 33.7 G/DL (ref 33–37)
MCV RBC AUTO: 89.9 FL (ref 80–94)
MONOCYTES # BLD: 0.3 K/UL (ref 0–0.8)
MONOCYTES NFR BLD: 4.6 % (ref 0–10)
NEUTROPHILS # BLD: 3.4 K/UL (ref 1.8–7)
NEUTROPHILS NFR BLD AUTO: 54.2 % (ref 50–75)
NRBC BLD AUTO-RTO: 0 % (ref 0–2)
PLATELET # BLD: 262 K/UL (ref 130–400)
PMV BLD AUTO: 7.7 FL (ref 7.2–11.7)
RBC # BLD AUTO: 2.77 MIL/UL (ref 4.4–5.9)
WBC # BLD AUTO: 6.2 K/UL (ref 4.8–10.8)

## 2018-08-27 NOTE — CP.PCM.CON
History of Present Illness





- History of Present Illness


History of Present Illness: 


Podiatry consult note for attending Dr. Maza, 





51 y/o male with PMHx of Anemia, Depression, and Type II Diabetes presented to 

the ED due to increasing redness and swelling to his right lower extremity with 

multiple wounds and abrasions. Patient states he saw his podiatrist, Dr. Maza, 

1 week ago due to the wounds on his right sided digits. Patient states he was 

prescribed antibiotics at that time, which he is still taking. Patient's wounds 

was dressed with Bactroban and DSD at that time. Patient reports removing the 

dressing at home after 1 days and applying a Silvercell dressing to the site. 

Patient reports the redness and swelling to his right leg worsened in the last 2

-3 days and he decided to come in to the ED. Patient denies any fever, nausea, 

vomiting, SOB, or chest pain. 





PMHx: Diabetes, Anemia, Depression


PSHx: Left sided BKA, hip surgery from 2014


Allergies: Penicillin, Oxycodone HCI





 





Review of Systems





- Review of Systems


All systems: reviewed and no additional remarkable complaints except


Review of Systems: 





As per HPI





Past Patient History





- Infectious Disease


Hx of Infectious Diseases: None





- Past Medical History & Family History


Past Medical History?: Yes





- Past Social History


Smoking Status: Former Smoker





- CARDIAC


Hx Hypertension: No (PT DENIES)





- PULMONARY


Hx Respiratory Disorders: No





- NEUROLOGICAL


Hx Neurological Disorder: No





- HEENT


Hx HEENT Problems: No





- RENAL


Hx Chronic Kidney Disease: No





- ENDOCRINE/METABOLIC


Hx Endocrine Disorders: Yes


Hx Diabetes Mellitus Type 1: Yes





- HEMATOLOGICAL/ONCOLOGICAL


Hx Anemia: Yes





- INTEGUMENTARY


Hx Dermatological Problems: No





- MUSCULOSKELETAL/RHEUMATOLOGICAL


Hx Fractures: Yes (Broke hip 2014 or 2015)





- GASTROINTESTINAL


Hx Crohn's Disease: No





- GENITOURINARY/GYNECOLOGICAL


Hx Genitourinary Disorders: No





- PSYCHIATRIC


Hx Depression: Yes


Hx Substance Use: No





- SURGICAL HISTORY


Hx Surgeries: Yes


Hx Amputation: Yes (LEFT AKA)


Hx Orthopedic Surgery: Yes (LEFT FOOT X 6-8)


Other/Comment: BILATERAL VEIN STRIPPING AND LIGATION





- ANESTHESIA


Hx Anesthesia: Yes


Hx Anesthesia Reactions: No


Hx Malignant Hyperthermia: No





Meds


Allergies/Adverse Reactions: 


 Allergies











Allergy/AdvReac Type Severity Reaction Status Date / Time


 


oxycodone HCl [From Percocet] Allergy  ITCHING Verified 07/27/18 19:02


 


Penicillins Allergy  SHORTNESS Verified 07/27/18 19:02





   OF BREATH  














Physical Exam





- Constitutional


Appears: Well, Non-toxic, No Acute Distress





- Head Exam


Head Exam: ATRAUMATIC, NORMOCEPHALIC





- Extremities Exam


Additional comments: 


Right Lower Extremity, BKA to Left Lower Extremity





VASC: DP 2/4, PT 1/4, CFT delayed to >5 seconds, TG warm to warm, with 

increasing warmth to the right leg circumferentially





NEURO: dimished protective sensation knee down





DERM: erythema and discoloration present to the right leg circumferentially 

from the ankle joint to mid-leg, +2 pitting edema noted as well to the right leg

, hyperpigmentation and discoloration present to the dorsum of the right foot, 

xerosis noted to the right lower extremity with increased xerosis at the 2nd 

digit, 2nd digit- increasingly swollen with eythema, 2 cm X 1 cm ulceration 

present to the dorsum of the second digit with a fibro-granular base, per-wound 

erythema and hyperkeratotic border, no drainage noted, no malodor, no probe to 

bone, no fluctuance, no tunneling, no tracking, multiple small abrasions noted 

to the dorsum of the 3rd digit and 4th digit with no drainage, no malodor, no 

probe to bone, no fluctuance, no tunneling, no tracking. 





ORTHO: ankle range of motion present, hallux range of motion within normal 

limits, patient unable to move digits 2, 3, and 4 due to severe swelling, pain 

on palpation to the anterior aspect of the right leg








- Neurological Exam


Neurological exam: Alert, Oriented x3





- Psychiatric Exam


Psychiatric exam: Normal Affect, Normal Mood





Results





- Vital Signs


Recent Vital Signs: 


 Last Vital Signs











Temp  97.7 F   08/27/18 17:30


 


Pulse  74   08/27/18 17:30


 


Resp  20   08/27/18 17:30


 


BP  142/79   08/27/18 17:30


 


Pulse Ox  98   08/27/18 17:30














- Labs


Result Diagrams: 


 08/27/18 13:18





 08/27/18 14:46


Labs: 


 Laboratory Results - last 24 hr











  08/27/18 08/27/18 08/27/18





  13:18 13:18 14:46


 


WBC  6.2  


 


RBC  2.77 L  


 


Hgb  8.4 L D  


 


Hct  24.9 L  


 


MCV  89.9  D  


 


MCH  30.3  


 


MCHC  33.7  


 


RDW  14.3  


 


Plt Count  262  


 


MPV  7.7  


 


Neut % (Auto)  54.2  


 


Lymph % (Auto)  29.6  


 


Mono % (Auto)  4.6  


 


Eos % (Auto)  10.9 H  


 


Baso % (Auto)  0.7  


 


Neut # (Auto)  3.4  


 


Lymph # (Auto)  1.8  


 


Mono # (Auto)  0.3  


 


Eos # (Auto)  0.7  


 


Baso # (Auto)  0.0  


 


ESR  87 H  


 


Sodium   140 


 


Potassium   6.0 H  6.0 H


 


Chloride   106 


 


Carbon Dioxide   23 


 


Anion Gap   17 


 


BUN   37 H 


 


Creatinine   2.3 H 


 


Est GFR ( Amer)   36 


 


Est GFR (Non-Af Amer)   30 


 


POC Glucose (mg/dL)   


 


Random Glucose   265 H 


 


Calcium   9.2 


 


Total Bilirubin   0.4 


 


AST   15 L D 


 


ALT   18 L D 


 


Alkaline Phosphatase   89 


 


Total Creatine Kinase   79 


 


Total Protein   7.6 


 


Albumin   4.4 


 


Globulin   3.1 


 


Albumin/Globulin Ratio   1.4 














  08/27/18





  17:41


 


WBC 


 


RBC 


 


Hgb 


 


Hct 


 


MCV 


 


MCH 


 


MCHC 


 


RDW 


 


Plt Count 


 


MPV 


 


Neut % (Auto) 


 


Lymph % (Auto) 


 


Mono % (Auto) 


 


Eos % (Auto) 


 


Baso % (Auto) 


 


Neut # (Auto) 


 


Lymph # (Auto) 


 


Mono # (Auto) 


 


Eos # (Auto) 


 


Baso # (Auto) 


 


ESR 


 


Sodium 


 


Potassium 


 


Chloride 


 


Carbon Dioxide 


 


Anion Gap 


 


BUN 


 


Creatinine 


 


Est GFR ( Amer) 


 


Est GFR (Non-Af Amer) 


 


POC Glucose (mg/dL)  185 H


 


Random Glucose 


 


Calcium 


 


Total Bilirubin 


 


AST 


 


ALT 


 


Alkaline Phosphatase 


 


Total Creatine Kinase 


 


Total Protein 


 


Albumin 


 


Globulin 


 


Albumin/Globulin Ratio 














Assessment & Plan





- Assessment and Plan (Free Text)


Assessment: 


51 y/o male with PMHx of Anemia, Depression, and Type II Diabetes presented to 

the ED due to increasing redness and swelling to his right lower extremity with 

multiple wounds and abrasions





Plan: 


Patient seen and evaluated for Dr. Maza


Plan discussed with Dr. Maza


Right Foot X-ray- no soft tissue emphysema visualized


LE Ultrasound- negative for DVT with excellent venous flow


ID Consult already placed- appreciated recommendations


Bactroban will be ordered for dressing to the right 2nd digit


Wound dressed with dry sterile dressing


Podiatry will follow patient while in house

## 2018-08-27 NOTE — C.PDOC
History Of Present Illness


52 year old male presents to the emergency department with wounds on his right 

second toe and behind his left knee. Patient reports that redness in his right 

lower leg wounds have been present for 1-2 weeks, but the swelling in his legs 

has been present for 2-3 days. Patient has been seeing his podiatrist Dr. Maza 

for wound care, and he is currently taking Ciprofloxacin PO.  He denies fever, 

chest pain, shortness of breath. 


Time Seen by Provider: 08/27/18 12:37


Chief Complaint (Nursing): Abnormal Skin Integrity


History Per: Patient


History/Exam Limitations: no limitations


Onset/Duration Of Symptoms: Days (2-3 days of leg swelling), Other (1-2 weeks 

of wounds)


Current Symptoms Are (Timing): Still Present


Location Of Injury: Right: Foot, Left: Knee, Posterior: Knee


Quality Of Symptoms: Swollen, Other (erythematous)


Severity: Moderate





Past Medical History


Reviewed: Historical Data, Nursing Documentation, Vital Signs


Vital Signs: 


 Last Vital Signs











Temp  97.9 F   08/30/18 15:00


 


Pulse  79   08/30/18 15:00


 


Resp  20   08/30/18 15:00


 


BP  133/77   08/30/18 15:00


 


Pulse Ox  98   08/30/18 15:00














- Medical History


PMH: Anemia, Depression, Diabetes, Fractures (Broke hip 2014 or 2015)


Surgical History: No Surg Hx





- [facility] Procedures








CLOSED RED-INT FIX FEMUR (06/24/14)


FLUOROSCOPY OF SUPERIOR VENA CAVA, GUIDANCE (10/26/16)


INSERTION OF INFUSION DEV INTO SUP VENA CAVA, PERC APPROACH (08/27/18)








Family History: States: No Known Family Hx





- Social History


Hx Tobacco Use: Yes


Hx Alcohol Use: No


Hx Substance Use: No





- Immunization History


Hx Tetanus Toxoid Vaccination: No


Hx Influenza Vaccination: No


Hx Pneumococcal Vaccination: No





Review Of Systems


Constitutional: Negative for: Fever


Cardiovascular: Negative for: Chest Pain, Palpitations


Respiratory: Negative for: Cough, Shortness of Breath


Gastrointestinal: Negative for: Nausea, Vomiting, Abdominal Pain


Musculoskeletal: Positive for: Leg Pain (wound behind left knee), Foot Pain (

wounds on right second toe)


Neurological: Negative for: Weakness, Numbness





Physical Exam





- Physical Exam


Appears: Well, Non-toxic, No Acute Distress


Skin: Warm, Dry, Other (see extremity exam)


Head: Atraumatic, Normacephalic


Eye(s): bilateral: Normal Inspection


Oral Mucosa: Moist


Cardiovascular: Rhythm Regular


Respiratory: Normal Breath Sounds, No Rales, No Rhonchi, No Wheezing


Gastrointestinal/Abdominal: Normal Exam, Bowel Sounds, Soft, No Tenderness


Extremity: Normal ROM, Calf Tenderness (right lower leg), No Deformity, 

Swelling (of the right lower leg), Other (left BKA, 2cm abrasion and erythema 

to the popliteal area, erythema to the right lower leg, 2cm ulcer to the right 

second toe, scattered small ulcerations to other right toes. )


Pulses: Left Femoral: Normal, Right Dorsalis Pedis: Normal


Neurological/Psych: Oriented x3, Normal Sensation (decreased sensation right 

foot and lower leg)





ED Course And Treatment





- Laboratory Results


Result Diagrams: 


 08/30/18 10:37





 08/30/18 10:37


ECG: Interpreted By Me, Viewed By Me (sinus rhythm 88bpm, normal axis, PVCs, no 

peaked T waves, no widening of QRS, no acute ST/T wave changes)


ECG Interpretation: No Acute Changes


O2 Sat by Pulse Oximetry: 99 (RA)


Pulse Ox Interpretation: Normal





- Other Rad


  ** RIGHT FOOT XRAY


X-Ray: Viewed By Me, Read By Radiologist


Interpretation: Accession No. : [id number].  Patient Name / ID : JORGE A DENT  / [id number].  Exam Date : 08/27/2018 13:07:03 ( Approved ).  Study 

Comment :  Sex / Age : M  / 52Y.  Creator : Derrick Rebolledo MD.  Dictator 

: Derrick Rebolledo MD.   :  Approver : Derrick Rebolledo MD.  

Approver2 :  Report Date : 08/27/2018 13:45:19.  My Comment :  *****************

******************************************************************.  Date of 

service:  08/27/2018.  PROCEDURE:  Right Foot Radiographs.  HISTORY:  right 

foot wounds, r/o gas/osteo.  COMPARISON:  Right foot radiographs dated 11/05/ 2017.  FINDINGS:  BONES:  Prior 5th transmetatarsal osteotomy. Stable 

flattening of the 2nd metatarsal head. No acute fracture.  JOINTS:  Diffuse 

joint space narrowing.  Multiple hammertoe deformities.  SOFT TISSUES:  No 

subcutaneous gas.  OTHER FINDINGS:  None.  IMPRESSION:  No subcutaneous gas.  

Prior distal 5th metatarsal osteotomy.  No significant interval change.


Progress Note: Blood work ordered and reviewed.   Patient given IV NS bolus, IV 

Vancomycin and IV Cefepime.  IV morphine given for pain, PO Kayexalate given 

for hyperkalemia without EKG changes.





- Physician Consult Information


Physician Contacted: Liliane Brito


Outcome Of Conversation: Discussed patient with PMD, agrees with admission for 

right leg cellulitis, diabetic ulcers, renal insufficiency, hyperkalemia.





Disposition





- Disposition


Disposition: HOSPITALIZED


Disposition Time: 15:30


Condition: STABLE





- Clinical Impression


Clinical Impression: 


 Cellulitis of right leg, Diabetic foot ulcers, Renal insufficiency, 

Hyperkalemia, Failure of outpatient treatment








- Scribe Statement


The provider has reviewed the documentation as recorded by the Scribe (Margarito Acosta)


Provider Attestation: 


All medical record entries made by the Scribe were at my direction and 

personally dictated by me. I have reviewed the chart and agree that the record 

accurately reflects my personal performance of the history, physical exam, 

medical decision making, and the department course for this patient. I have 

also personally directed, reviewed, and agree with the discharge instructions 

and disposition.





Decision To Admit





- Pt Status Changed To:


Hospital Disposition Of: Inpatient





- Admit Certification


Admit to Inpatient:: After my assessment, the patient will require 

hospitalization for at least two midnights.  This is because of the severity of 

symptoms shown, intensity of services needed, and/or the medical risk in this 

patient being treated as an outpatient.





- InPatient:


Physician Admission Certification: I certify that this patient requires 2 or 

more midnights of care for the following reason:: see notes





- .


Bed Request Type: Regular


Admitting Physician: Liliane Brito


Patient Diagnosis: 


 Cellulitis of right leg, Diabetic foot ulcers, Renal insufficiency, 

Hyperkalemia

## 2018-08-27 NOTE — RAD
Date of service: 



08/27/2018



PROCEDURE:  Right Foot Radiographs.



HISTORY:

right foot wounds, r/o gas/osteo  



COMPARISON:

Right foot radiographs dated 11/05/2017.



FINDINGS:



BONES:

Prior 5th transmetatarsal osteotomy. Stable flattening of the 2nd 

metatarsal head. No acute fracture. 



JOINTS:

Diffuse joint space narrowing.  Multiple hammertoe deformities. 



SOFT TISSUES:

No subcutaneous gas. 



OTHER FINDINGS:

None.



IMPRESSION:

No subcutaneous gas.  Prior distal 5th metatarsal osteotomy.  No 

significant interval change.

## 2018-08-27 NOTE — VASCLAB
Date of service: 



08/27/2018



PROCEDURE:  Right Lower Extremity Venous Duplex Exam. 



HISTORY:

Right leg pain, swelling r/o DVT 



PRIORS:

None. 



TECHNIQUE:

Right common femoral, femoral, popliteal and posterior tibial, 

peroneal and great saphenous veins were evaluated. Flow was assessed 

with color Doppler, compressibility, assessment of phasic flow and 

augmentation response.



Report prepared by   BENITO Hamilton



FINDINGS:



RIGHT:

1. Common Femoral Vein: 



1.1. Compressibility - Fully compressible: Thrombus -  None: Flow - 

Phasic: Augmentation -Normal: Reflux - None.



2. Femoral Vein: 



2.1. Compressibility - Fully compressible: Thrombus -  None: Flow - 

Phasic: Augmentation -Normal: Reflux - None.



3. Popliteal Vein: 



3.1. Compressibility - Fully compressible: Thrombus -  None: Flow - 

Phasic: Augmentation -Normal: Reflux - None.



4. Posterior Tibial Vein: 



4.1. Compressibility - Fully compressible: Thrombus -  None: Flow - 

Phasic: Augmentation -Normal: Reflux - None.



5. Peroneal Vein: 



5.1. Compressibility - Fully compressible: Thrombus - None:  Flow - 

Phasic: Augmentation -Normal: Reflux - None.



6. Great Saphenous Vein: (lower only) upper not visualized.

6.1.  Compressibility - Fully compressible: Thrombus -None: Flow - 

Phasic: Augmentation - Normal: Reflux - None.





OTHER FINDINGS:  Incidental findings: Patent right posterior tibial, 

anterior tibial and dorsalis pedis arteries.



IMPRESSION:

No evidence of deep or superficial vein thrombosis of the right lower 

extremity with excellent venous flow. Normal valve function noted of 

the right side.     



Normal venous flow noted in the left common femoral vein.

## 2018-08-28 LAB
ALBUMIN SERPL-MCNC: 3.4 G/DL (ref 3.5–5)
ALBUMIN/GLOB SERPL: 1.3 {RATIO} (ref 1–2.1)
ALT SERPL-CCNC: 26 U/L (ref 21–72)
AST SERPL-CCNC: 15 U/L (ref 17–59)
BUN SERPL-MCNC: 32 MG/DL (ref 9–20)
CALCIUM SERPL-MCNC: 8.2 MG/DL (ref 8.6–10.4)
GFR NON-AFRICAN AMERICAN: 32

## 2018-08-28 RX ADMIN — Medication SCH CAP: at 09:48

## 2018-08-28 RX ADMIN — INSULIN ASPART SCH UNIT: 100 INJECTION, SOLUTION INTRAVENOUS; SUBCUTANEOUS at 17:55

## 2018-08-28 RX ADMIN — CEFEPIME SCH MLS/HR: 1 INJECTION, SOLUTION INTRAVENOUS at 05:43

## 2018-08-28 RX ADMIN — INSULIN ASPART SCH: 100 INJECTION, SOLUTION INTRAVENOUS; SUBCUTANEOUS at 21:54

## 2018-08-28 RX ADMIN — CEFEPIME SCH MLS/HR: 1 INJECTION, SOLUTION INTRAVENOUS at 17:30

## 2018-08-28 RX ADMIN — INSULIN ASPART SCH UNIT: 100 INJECTION, SOLUTION INTRAVENOUS; SUBCUTANEOUS at 12:23

## 2018-08-28 RX ADMIN — INSULIN ASPART SCH UNIT: 100 INJECTION, SOLUTION INTRAVENOUS; SUBCUTANEOUS at 07:57

## 2018-08-28 RX ADMIN — PANTOPRAZOLE SODIUM SCH MG: 40 TABLET, DELAYED RELEASE ORAL at 09:48

## 2018-08-28 RX ADMIN — Medication SCH TAB: at 09:46

## 2018-08-28 NOTE — CP.PCM.CON
History of Present Illness





- History of Present Illness


History of Present Illness: 





dictated





Past Patient History





- Infectious Disease


Hx of Infectious Diseases: None





- Past Medical History & Family History


Past Medical History?: Yes





- Past Social History


Smoking Status: Former Smoker





- CARDIAC


Hx Hypertension: No (PT DENIES)





- PULMONARY


Hx Respiratory Disorders: No





- NEUROLOGICAL


Hx Neurological Disorder: No





- HEENT


Hx HEENT Problems: No





- RENAL


Hx Chronic Kidney Disease: No





- ENDOCRINE/METABOLIC


Hx Endocrine Disorders: Yes


Hx Diabetes Mellitus Type 1: Yes





- HEMATOLOGICAL/ONCOLOGICAL


Hx Anemia: Yes





- INTEGUMENTARY


Hx Dermatological Problems: No





- MUSCULOSKELETAL/RHEUMATOLOGICAL


Hx Falls: No





- GASTROINTESTINAL


Hx Crohn's Disease: No





- GENITOURINARY/GYNECOLOGICAL


Hx Genitourinary Disorders: No





- PSYCHIATRIC


Hx Substance Use: No





- SURGICAL HISTORY


Hx Surgeries: Yes


Hx Amputation: Yes (LEFT AKA)


Hx Orthopedic Surgery: Yes (LEFT FOOT X 6-8)


Other/Comment: BILATERAL VEIN STRIPPING AND LIGATION





- ANESTHESIA


Hx Anesthesia: Yes


Hx Anesthesia Reactions: No


Hx Malignant Hyperthermia: No





Meds


Allergies/Adverse Reactions: 


 Allergies











Allergy/AdvReac Type Severity Reaction Status Date / Time


 


oxycodone HCl [From Percocet] Allergy  ITCHING Verified 07/27/18 19:02


 


Penicillins Allergy  SHORTNESS Verified 07/27/18 19:02





   OF BREATH  














- Medications


Medications: 


 Current Medications





Acetaminophen (Tylenol 325mg Tab)  650 mg PO Q6 PRN


   PRN Reason: Pain, Mild (1-3)


   Last Admin: 08/28/18 00:08 Dose:  650 mg


Aspirin (Aspirin Chewable)  81 mg PO DAILY [facility]


   Last Admin: 08/28/18 09:48 Dose:  81 mg


Enalapril Maleate (Vasotec)  2.5 mg PO DAILY [facility]


   Last Admin: 08/28/18 09:47 Dose:  2.5 mg


Gabapentin (Neurontin)  100 mg PO QID [facility]


   Last Admin: 08/28/18 09:48 Dose:  100 mg


Heparin Sodium (Porcine) (Heparin)  5,000 units SC Q12 [facility]


   Last Admin: 08/28/18 09:47 Dose:  5,000 units


Hydromorphone HCl (Dilaudid)  1 mg PO Q6H PRN


   PRN Reason: Pain, severe (8-10)


Cefepime HCl (Maxipime Iv 2 Gm Premix)  2 gm in 100 mls @ 100 mls/hr IVPB Q12H 

[facility]


   PRN Reason: Protocol


   Stop: 09/02/18 06:01


   Last Admin: 08/28/18 05:43 Dose:  100 mls/hr


Insulin Aspart (Novolog)  0 unit SC ACHS [facility]


   PRN Reason: Protocol


   Last Admin: 08/28/18 12:23 Dose:  4 unit


Lactobacillus Acidophilus (Bacid Acidophilus)  1 cap PO DAILY [facility]


   Last Admin: 08/28/18 09:48 Dose:  1 cap


Multivitamins (Hexavitamin)  1 tab PO DAILY [facility]


   Last Admin: 08/28/18 09:46 Dose:  1 tab


Mupirocin (Bactroban Ointment)  0 gm TOP BID [facility]


Pantoprazole Sodium (Protonix Ec Tab)  40 mg PO DAILY [facility]


   Last Admin: 08/28/18 09:48 Dose:  40 mg


Pneumococcal Polyvalent Vaccine (Pneumovax 23 Vaccine)  0.5 ml IM .ONCE ONE


   Stop: 08/29/18 10:01


Sertraline HCl (Zoloft)  150 mg PO DAILY [facility]


   Last Admin: 08/28/18 09:47 Dose:  150 mg











Results





- Vital Signs


Recent Vital Signs: 


 Last Vital Signs











Temp  98.4 F   08/28/18 07:34


 


Pulse  85   08/28/18 07:34


 


Resp  20   08/28/18 07:34


 


BP  156/87 H  08/28/18 09:47


 


Pulse Ox  96   08/28/18 07:34














- Labs


Result Diagrams: 


 08/27/18 13:18





 08/28/18 07:24


Labs: 


 Laboratory Results - last 24 hr











  08/27/18 08/27/18 08/27/18





  13:18 13:18 14:46


 


WBC  6.2  


 


RBC  2.77 L  


 


Hgb  8.4 L D  


 


Hct  24.9 L  


 


MCV  89.9  D  


 


MCH  30.3  


 


MCHC  33.7  


 


RDW  14.3  


 


Plt Count  262  


 


MPV  7.7  


 


Neut % (Auto)  54.2  


 


Lymph % (Auto)  29.6  


 


Mono % (Auto)  4.6  


 


Eos % (Auto)  10.9 H  


 


Baso % (Auto)  0.7  


 


Neut # (Auto)  3.4  


 


Lymph # (Auto)  1.8  


 


Mono # (Auto)  0.3  


 


Eos # (Auto)  0.7  


 


Baso # (Auto)  0.0  


 


ESR  87 H  


 


Sodium   140 


 


Potassium   6.0 H  6.0 H


 


Chloride   106 


 


Carbon Dioxide   23 


 


Anion Gap   17 


 


BUN   37 H 


 


Creatinine   2.3 H 


 


Est GFR ( Amer)   36 


 


Est GFR (Non-Af Amer)   30 


 


POC Glucose (mg/dL)   


 


Random Glucose   265 H 


 


Calcium   9.2 


 


Total Bilirubin   0.4 


 


AST   15 L D 


 


ALT   18 L D 


 


Alkaline Phosphatase   89 


 


Total Creatine Kinase   79 


 


Total Protein   7.6 


 


Albumin   4.4 


 


Globulin   3.1 


 


Albumin/Globulin Ratio   1.4 


 


Random Vancomycin   














  08/27/18 08/27/18 08/28/18





  17:41 21:10 07:03


 


WBC   


 


RBC   


 


Hgb   


 


Hct   


 


MCV   


 


MCH   


 


MCHC   


 


RDW   


 


Plt Count   


 


MPV   


 


Neut % (Auto)   


 


Lymph % (Auto)   


 


Mono % (Auto)   


 


Eos % (Auto)   


 


Baso % (Auto)   


 


Neut # (Auto)   


 


Lymph # (Auto)   


 


Mono # (Auto)   


 


Eos # (Auto)   


 


Baso # (Auto)   


 


ESR   


 


Sodium   


 


Potassium   


 


Chloride   


 


Carbon Dioxide   


 


Anion Gap   


 


BUN   


 


Creatinine   


 


Est GFR ( Amer)   


 


Est GFR (Non-Af Amer)   


 


POC Glucose (mg/dL)  185 H  134 H  222 H


 


Random Glucose   


 


Calcium   


 


Total Bilirubin   


 


AST   


 


ALT   


 


Alkaline Phosphatase   


 


Total Creatine Kinase   


 


Total Protein   


 


Albumin   


 


Globulin   


 


Albumin/Globulin Ratio   


 


Random Vancomycin   














  08/28/18 08/28/18 08/28/18





  07:24 11:06 11:16


 


WBC   


 


RBC   


 


Hgb   


 


Hct   


 


MCV   


 


MCH   


 


MCHC   


 


RDW   


 


Plt Count   


 


MPV   


 


Neut % (Auto)   


 


Lymph % (Auto)   


 


Mono % (Auto)   


 


Eos % (Auto)   


 


Baso % (Auto)   


 


Neut # (Auto)   


 


Lymph # (Auto)   


 


Mono # (Auto)   


 


Eos # (Auto)   


 


Baso # (Auto)   


 


ESR   


 


Sodium  141  


 


Potassium  5.0  


 


Chloride  108 H  


 


Carbon Dioxide  21 L  


 


Anion Gap  17  


 


BUN  32 H  


 


Creatinine  2.2 H  


 


Est GFR ( Amer)  38  


 


Est GFR (Non-Af Amer)  32  


 


POC Glucose (mg/dL)   283 H 


 


Random Glucose  193 H  


 


Calcium  8.2 L  


 


Total Bilirubin  0.2  


 


AST  15 L  


 


ALT  26  


 


Alkaline Phosphatase  76  


 


Total Creatine Kinase   


 


Total Protein  6.1 L  


 


Albumin  3.4 L D  


 


Globulin  2.7  


 


Albumin/Globulin Ratio  1.3  


 


Random Vancomycin    7.2

## 2018-08-29 LAB
% IRON SATURATION: 19 (ref 20–55)
IRON SERPL-MCNC: 48 UG/DL (ref 49–181)
TIBC SERPL-MCNC: 258 UG/DL (ref 250–450)

## 2018-08-29 PROCEDURE — 02HV33Z INSERTION OF INFUSION DEVICE INTO SUPERIOR VENA CAVA, PERCUTANEOUS APPROACH: ICD-10-PCS | Performed by: RADIOLOGY

## 2018-08-29 RX ADMIN — Medication SCH TAB: at 09:25

## 2018-08-29 RX ADMIN — INSULIN ASPART SCH UNIT: 100 INJECTION, SOLUTION INTRAVENOUS; SUBCUTANEOUS at 17:26

## 2018-08-29 RX ADMIN — CEFEPIME SCH MLS/HR: 1 INJECTION, SOLUTION INTRAVENOUS at 18:20

## 2018-08-29 RX ADMIN — CEFEPIME SCH MLS/HR: 1 INJECTION, SOLUTION INTRAVENOUS at 05:45

## 2018-08-29 RX ADMIN — INSULIN ASPART SCH UNIT: 100 INJECTION, SOLUTION INTRAVENOUS; SUBCUTANEOUS at 22:20

## 2018-08-29 RX ADMIN — PANTOPRAZOLE SODIUM SCH MG: 40 TABLET, DELAYED RELEASE ORAL at 09:25

## 2018-08-29 RX ADMIN — INSULIN ASPART SCH UNIT: 100 INJECTION, SOLUTION INTRAVENOUS; SUBCUTANEOUS at 11:48

## 2018-08-29 RX ADMIN — Medication SCH CAP: at 09:25

## 2018-08-29 RX ADMIN — INSULIN ASPART SCH UNIT: 100 INJECTION, SOLUTION INTRAVENOUS; SUBCUTANEOUS at 07:49

## 2018-08-29 NOTE — RAD
Date of service: 



08/29/2018



PROCEDURE:  CHEST RADIOGRAPH, 1 VIEW



HISTORY:

verify left PICC



COMPARISON:

Chest radiograph dated 01/21/2018.



FINDINGS:



LUNGS:

Clear.



PLEURA:

No pneumothorax or pleural fluid seen.



CARDIOVASCULAR:

Normal.



OSSEOUS STRUCTURES:

Unchanged.



VISUALIZED UPPER ABDOMEN:

Normal.



OTHER FINDINGS:

New left upper extremity PICC with catheter tip at the cavoatrial 

junction. 



IMPRESSION:

Left upper extremity PICC in satisfactory position. No focal 

consolidation or pleural effusion.

## 2018-08-29 NOTE — CARD
--------------- APPROVED REPORT --------------





Date of service: 08/27/2018



EKG Measurement

Heart [id number]

OR [id number]

[id number]

[id number]

[id number]



<Conclusion>

Sinus rhythm with occasional premature ventricular complexes

Otherwise normal ECG

## 2018-08-29 NOTE — HP
Copied To:  Liliane Brito MD

Attending MD:  Liliane Brito MD



HISTORY OF PRESENT ILLNESS:  This is a 52-year-old  male with

history of multiple medical problems, status post left below knee

amputation, presented to emergency room with pain, swelling, redness, and

wounds in the right foot and right lower extremity.  The patient stated

that he injured his right foot as he was in a swimming pool a few days ago.

The patient was using local antibiotic treatment by his own with no

improvement.  The patient presented to emergency room for evaluation and he

was found to have significant cellulitis of the right lower extremity.  The

patient was admitted for further management after he was started on IV

antibiotics.  Other review of system is negative.



ALLERGIES:  POSITIVE FOR PENICILLIN AND OXYCODONE.



SOCIAL HISTORY:  Smoker.  No EtOH or substance abuse.



FAMILY HISTORY:  Noncontributory.



MEDICATIONS AT HOME:  Reviewed as per MAR.



PAST MEDICAL HISTORY:  Type 2 diabetes mellitus, uncontrolled; chronic

kidney disease; peripheral vascular disease, status post left below knee

amputation.



PHYSICAL EXAMINATION:

GENERAL:  The patient is in bed, not in any cardiopulmonary distress.

VITAL SIGNS:  Blood pressure 130/75, temperature 98, respiratory rate 20,

and pulse 68.

HEENT:  Pupils equal, reactive to light.  Slightly pale mucosa of the

conjunctivae.  No exudation or congestion of the oropharynx or nasal

mucosa.

NECK:  Supple.  No JVD.  No carotid bruit.  No lymph node.  No thyromegaly.

CHEST AND LUNGS:  Bilateral symmetrical expansion.  Good air exchange.  No

rales.  No rhonchi.

CARDIOVASCULAR SYSTEM:  PMI not localized.  S1 and S2.  No additional

sounds.

ABDOMEN:  Normoactive bowel sounds.  No tenderness.  No organomegaly.  No

masses.

EXTREMITIES:  The patient has superficial wounds on the dorsum of the

second and fourth digits as well as extensive redness from the foot up to

the knee with redness and swelling of the right lower extremity  The

patient also has left below knee amputation and he has a small superficial

abrasion on the posterior aspect of the left knee at the site of the

prosthesis.



BLOOD WORK:  Done showed BUN of 37, creatinine 2.3, hemoglobin of 8.4,

hematocrit 24.9.



ASSESSMENT:

1.  Cellulitis of the right lower extremity that did not respond to

outpatient treatment.

2.  Uncontrolled type 2 diabetes mellitus.

3.  Anemia of chronic disease.

4.  Chronic kidney disease stage III.



PLAN:  IV antibiotics.  Blood culture and follow an ID consult and follow

recommendations.  Resume the patient's home medications.  Accu-Cheks with

insulin coverage as needed.







__________________________________________

[facility] [place] MD Daryl



DD:  08/29/2018 7:02:41

DT:  08/29/2018 7:05:37

Job # [id number]

## 2018-08-29 NOTE — CON
Copied To:  Leonor Willoughby MD

Attending MD:  Leonor Willoughby MD



DATE:  08/28/2018



INFECTIOUS DISEASE CONSULTATION



REQUESTED BY:  Dr. Brito



HISTORY OF PRESENT ILLNESS:  This patient is a 52-year-old male.  He has a

history of depression, diabetes type 2, and anemia.  He has a prosthesis of

the left leg, and he has right lower extremity multiple wounds and

abrasions and swelling.  He follows with Dr. Maza.  He said one week ago

he went there and his toes were red and was having lot of abrasions and

wounds.  Dr. Maza gave Cipro at that time, and he was taking.  He said

when he removed the dressing at home, he found that it was not getting any

better.  Redness and swelling were worsening and going to his right leg. 

Hence, he decided to come.  He has a dressing which was just changed.  He

did not allow me to remove it at this time.  Denies any fever.  No nausea. 

No vomiting.  No shortness of breath.  No chest pain, but he was on oral

antibiotic, Cipro.



ALLERGIES:  HE IS ALLERGIC TO OXYCODONE AND PENICILLIN.



PAST MEDICAL HISTORY:  Significant for diabetes, anemia, depression.  He

has had a left hip surgery, has bruise there, in 2014 and also went through

left-sided BKA two years back.



SOCIAL HISTORY:  Significant for he is a past smoker.  He follows Dr. Maza.



REVIEW OF SYSTEMS:  He came in because of the right leg swelling and

ulcerations and redness on the foot which are going to the leg.  He has

cardiac history of hypertension.  No respiratory issues.  Denies any

neurological problems.  He denies chronic kidney disease.  No HEENT

problems.  He does have anemia.  Has diabetes mellitus, anemia.  He has

history of fracture.  He has a bruise on his left hip.  He has Crohn

disease.  He has a history of depression.  Surgery wise, he had a left AKA

done.  He has left hip with screws present, and he has bilateral venous

stripping done in the past.  He is allergic to penicillin and oxycodone.



MEDICATIONS:  He is on Tylenol, aspirin.  He is on Maxipime and he is

tolerating it.  He is on Vasotec, Neurontin, heparin, Dilaudid, NovoLog,

Bacid, vitamin, bacitracin, Protonix, Pneumovax, and Zoloft.  _____

vancomycin.



PHYSICAL EXAMINATION:

VITAL SIGNS:  I find, his temperature is 98.4, pulse is 85, blood pressure

is 156/87, respirations are 20.

GENERAL:  He is alert, awake, able to give history.

HEENT:  Head is atraumatic, normocephalic.  Pupils are reacting to light. 

Eye movements are unremarkable.

NECK:  Supple.  JVP is flat.

LUNGS:  Clear.  No crackles or rales present.

HEART:  S1, S2 are regular.

ABDOMEN:  Soft, nontender.  No guarding, no rigidity present.  Left hip has

a surgical scar.  Left leg AKA stump looks unremarkable.  Right leg:  He

has edema.  He has cellulitis on the leg at this time, and he has a

dressing.



ASSESSMENT AND PLAN:  I am going to see the podiatry note that he has

increased swelling and erythema of all the extremities, and he has

abrasions on the third and the fourth toes with no drainage, no malodor,

and no tunneling.  At this time, white count is 6.2, hemoglobin 8.4,

hematocrit 24.9, platelet count is 262.  We will add vancomycin at this

time to the present treatment.  He had an ultrasound done which was

negative for deep venous thrombosis.  We will see if we need to get a CAT

scan of the foot as we cannot do an MRI.  He has metal screws in the hip. 

Impression, there is no subcutaneous _____ distal fifth metatarsal

osteotomy, no significant interval change.  We will take a look at these

ulcerations as possible, but for now I will add vancomycin and we will

follow with the podiatrist and the primary.





__________________________________________

Leonor Willoughby MD





DD:  08/28/2018 13:25:26

DT:  08/28/2018 13:29:21

Job # [id number]

## 2018-08-29 NOTE — CP.PCM.PN
Subjective





- Date & Time of Evaluation


Date of Evaluation: 08/29/18


Time of Evaluation: 13:56





- Subjective


Subjective: 





Podiatry Progress Note for Dr. Maza





52M seen and evaluated at bedside for multiple, superficial wounds to right 

dorsal digits 2-4 along with improved cellulitic changes. Patient is AAO x 3 

and NAD at time of visit. States that he no longer has pain at his digits. 

Denies any acute overnight events or new pedal complaints at this time. Denies 

any recent N/V/F/C/CP/SOB/D. 





Objective





- Vital Signs/Intake and Output


Vital Signs (last 24 hours): 


 











Temp Pulse Resp BP Pulse Ox


 


 97.7 F   77   20   149/80   96 


 


 08/29/18 07:30  08/29/18 07:30  08/29/18 07:30  08/29/18 09:25  08/29/18 07:30








Intake and Output: 


 











 08/29/18 08/29/18





 06:59 18:59


 


Intake Total 625 


 


Balance 625 














- Medications


Medications: 


 Current Medications





Acetaminophen (Tylenol 325mg Tab)  650 mg PO Q6 PRN


   PRN Reason: Pain, Mild (1-3)


   Last Admin: 08/28/18 00:08 Dose:  650 mg


Aspirin (Aspirin Chewable)  81 mg PO DAILY [facility]


   Last Admin: 08/29/18 09:25 Dose:  81 mg


Enalapril Maleate (Vasotec)  2.5 mg PO DAILY [facility]


   Last Admin: 08/29/18 09:25 Dose:  2.5 mg


Gabapentin (Neurontin)  100 mg PO QID [facility]


   Last Admin: 08/29/18 13:29 Dose:  100 mg


Heparin Sodium (Porcine) (Heparin)  5,000 units SC Q12 [facility]


   Last Admin: 08/29/18 09:24 Dose:  5,000 units


Hydromorphone HCl (Dilaudid)  2 mg PO Q6H PRN


   PRN Reason: Pain, severe (8-10)


   Last Admin: 08/28/18 20:24 Dose:  2 mg


Cefepime HCl (Maxipime Iv 2 Gm Premix)  2 gm in 100 mls @ 100 mls/hr IVPB Q12H 

[facility]


   PRN Reason: Protocol


   Stop: 09/02/18 06:01


   Last Admin: 08/29/18 05:45 Dose:  100 mls/hr


Daptomycin 500 mg/ Sodium (Chloride)  100 mls @ 100 mls/hr IV Q24H [facility]


   PRN Reason: Protocol


   Stop: 09/02/18 14:31


   Last Admin: 08/29/18 13:43 Dose:  100 mls/hr


Insulin Aspart (Novolog)  0 unit SC ACHS [facility]


   PRN Reason: Protocol


   Last Admin: 08/29/18 11:48 Dose:  2 unit


Lactobacillus Acidophilus (Bacid Acidophilus)  1 cap PO DAILY [facility]


   Last Admin: 08/29/18 09:25 Dose:  1 cap


Multivitamins (Hexavitamin)  1 tab PO DAILY [facility]


   Last Admin: 08/29/18 09:25 Dose:  1 tab


Mupirocin (Bactroban Ointment)  0 gm TOP BID [facility]


   Last Admin: 08/29/18 09:25 Dose:  1 unit


Pantoprazole Sodium (Protonix Ec Tab)  40 mg PO DAILY [facility]


   Last Admin: 08/29/18 09:25 Dose:  40 mg


Sertraline HCl (Zoloft)  150 mg PO DAILY [facility]


   Last Admin: 08/29/18 09:25 Dose:  150 mg











- Labs


Labs: 


 





 08/27/18 13:18 





 08/28/18 07:24 











- Constitutional


Appears: Well, Non-toxic, No Acute Distress





- Extremities Exam


Additional comments: 





Right Lower Extremity, BKA to Left Lower Extremity





VASC: DP 2/4, PT 1/4, CFT delayed to >5 seconds, Temperature gradient WNL to 

right leg, no increase in temperature noted





NEURO: dimished protective sensation knee down





DERM: Discoloration present to the right leg circumferentially from the ankle 

joint to mid-leg. No erythema noted. Minimal edema noted to right leg. 

Hyperpigmentation and discoloration present to the dorsum of the right foot, 

xerosis noted to the right lower extremity with increased xerosis at the 2nd 

digit, 2nd digit- increasingly swollen with eythema, 2 cm X 1 cm superficial 

ulceration present to the dorsum of the second digit with a fibro-granular base

, ean-wound erythema and hyperkeratotic border, no drainage noted, no malodor, 

no probe to bone, no fluctuance, no tunneling, no tracking, multiple small 

abrasions noted to the dorsum of the 3rd digit and 4th digit with no drainage, 

no malodor, no probe to bone, no fluctuance, no tunneling, no tracking. Wound 

appear to be scabbing over and clinically appear to be healing





ORTHO: ankle range of motion WNL, hallux range of motion within normal limits, 

patient able to minimally flex digits 2-4, pain on palpation to the anterior 

aspect of the right leg. Pain on palpation to digits 2-4





- Neurological Exam


Neurological Exam: Alert, Awake, Oriented x3





- Psychiatric Exam


Psychiatric exam: Normal Affect, Normal Mood





Assessment and Plan





- Assessment and Plan (Free Text)


Assessment: 





52M seen and evaluated at bedside for multiple, superficial wounds to right 

dorsal digits 2-4 along with improved cellulitic changes


Plan: 





Patient seen and evaluated


Plan discussed with Dr. Maza


Afebrile


Continue abx per ID


Foot X-ray (8/27)- negative for soft tissue emphysema


LE US (8/27): negative for DVT


ESR- 87


CRP- Ordered


Wounds dressed with bactroban, DSD


No plan for surgical intervention at this time


Podiatry will continue to follow while patient in house

## 2018-08-30 VITALS — SYSTOLIC BLOOD PRESSURE: 133 MMHG | HEART RATE: 79 BPM | TEMPERATURE: 97.9 F | DIASTOLIC BLOOD PRESSURE: 77 MMHG

## 2018-08-30 LAB
ALBUMIN SERPL-MCNC: 3.4 G/DL (ref 3.5–5)
ALBUMIN/GLOB SERPL: 1.2 {RATIO} (ref 1–2.1)
ALT SERPL-CCNC: 23 U/L (ref 21–72)
AST SERPL-CCNC: 15 U/L (ref 17–59)
BASOPHILS # BLD AUTO: 0.1 K/UL (ref 0–0.2)
BASOPHILS NFR BLD: 1.3 % (ref 0–2)
BUN SERPL-MCNC: 32 MG/DL (ref 9–20)
CALCIUM SERPL-MCNC: 8.9 MG/DL (ref 8.6–10.4)
EOSINOPHIL # BLD AUTO: 0.6 K/UL (ref 0–0.7)
EOSINOPHIL NFR BLD: 11.5 % (ref 0–4)
ERYTHROCYTE [DISTWIDTH] IN BLOOD BY AUTOMATED COUNT: 14.2 % (ref 11.5–14.5)
GFR NON-AFRICAN AMERICAN: 43
HGB BLD-MCNC: 7.8 G/DL (ref 12–18)
LYMPHOCYTES # BLD AUTO: 1.9 K/UL (ref 1–4.3)
LYMPHOCYTES NFR BLD AUTO: 35.6 % (ref 20–40)
MCH RBC QN AUTO: 30 PG (ref 27–31)
MCHC RBC AUTO-ENTMCNC: 33.6 G/DL (ref 33–37)
MCV RBC AUTO: 89.1 FL (ref 80–94)
MONOCYTES # BLD: 0.3 K/UL (ref 0–0.8)
MONOCYTES NFR BLD: 6 % (ref 0–10)
NEUTROPHILS # BLD: 2.4 K/UL (ref 1.8–7)
NEUTROPHILS NFR BLD AUTO: 45.6 % (ref 50–75)
NRBC BLD AUTO-RTO: 0.1 % (ref 0–2)
PLATELET # BLD: 231 K/UL (ref 130–400)
PMV BLD AUTO: 7.5 FL (ref 7.2–11.7)
RBC # BLD AUTO: 2.6 MIL/UL (ref 4.4–5.9)
WBC # BLD AUTO: 5.3 K/UL (ref 4.8–10.8)

## 2018-08-30 RX ADMIN — CEFEPIME SCH MLS/HR: 1 INJECTION, SOLUTION INTRAVENOUS at 17:51

## 2018-08-30 RX ADMIN — Medication SCH TAB: at 09:21

## 2018-08-30 RX ADMIN — CEFEPIME SCH MLS/HR: 1 INJECTION, SOLUTION INTRAVENOUS at 05:37

## 2018-08-30 RX ADMIN — INSULIN ASPART SCH UNIT: 100 INJECTION, SOLUTION INTRAVENOUS; SUBCUTANEOUS at 17:51

## 2018-08-30 RX ADMIN — INSULIN ASPART SCH UNIT: 100 INJECTION, SOLUTION INTRAVENOUS; SUBCUTANEOUS at 08:06

## 2018-08-30 RX ADMIN — INSULIN ASPART SCH UNIT: 100 INJECTION, SOLUTION INTRAVENOUS; SUBCUTANEOUS at 12:31

## 2018-08-30 RX ADMIN — Medication SCH CAP: at 09:20

## 2018-08-30 RX ADMIN — PANTOPRAZOLE SODIUM SCH MG: 40 TABLET, DELAYED RELEASE ORAL at 09:21

## 2018-08-30 NOTE — CP.PCM.PN
Subjective





- Date & Time of Evaluation


Date of Evaluation: 08/30/18


Time of Evaluation: 10:16





- Subjective


Subjective: 





Podiatry Progress Note for Dr. Maza





52M seen and evaluated at bedside with Dr. Maza for multiple, superficial 

wounds to right dorsal digits 2-4. Patient is AAO x 3 and NAD at time of visit. 

States that he continues to have no pain in any of the digits. Denies any acute 

overnight events or new pedal complaints at this time. Denies any recent N/V/F/C

/CP/SOB/D. 





Objective





- Vital Signs/Intake and Output


Vital Signs (last 24 hours): 


 











Temp Pulse Resp BP Pulse Ox


 


 98 F   77   20   130/70   96 


 


 08/30/18 07:38  08/30/18 07:38  08/30/18 07:38  08/30/18 09:20  08/30/18 07:38








Intake and Output: 


 











 08/30/18 08/30/18





 06:59 18:59


 


Intake Total 400 


 


Balance 400 














- Medications


Medications: 


 Current Medications





Acetaminophen (Tylenol 325mg Tab)  650 mg PO Q6 PRN


   PRN Reason: Pain, Mild (1-3)


   Last Admin: 08/28/18 00:08 Dose:  650 mg


Aspirin (Aspirin Chewable)  81 mg PO DAILY [facility]


   Last Admin: 08/30/18 09:21 Dose:  81 mg


Dextrose (Dextrose 50% Inj)  0 ml IV STAT PRN; Protocol


   PRN Reason: Hypoglycemia Protocol


Dextrose (Glutose 15)  0 gm PO ONCE PRN; Protocol


   PRN Reason: Hypoglycemia Protocol


Enalapril Maleate (Vasotec)  2.5 mg PO DAILY [facility]


   Last Admin: 08/30/18 09:20 Dose:  2.5 mg


Ferrous Sulfate (Feosol)  325 mg PO DAILY [facility]


   Last Admin: 08/30/18 09:20 Dose:  325 mg


Gabapentin (Neurontin)  100 mg PO QID [facility]


   Last Admin: 08/30/18 09:21 Dose:  100 mg


Glucagon (Glucagen Diagnostic Kit)  0 mg IM STAT PRN; Protocol


   PRN Reason: Hypoglycemia Protocol


Heparin Sodium (Porcine) (Heparin)  5,000 units SC Q12 [facility]


   Last Admin: 08/30/18 09:21 Dose:  5,000 units


Hydromorphone HCl (Dilaudid)  2 mg PO Q6H PRN


   PRN Reason: Pain, severe (8-10)


   Last Admin: 08/29/18 21:35 Dose:  2 mg


Cefepime HCl (Maxipime Iv 2 Gm Premix)  2 gm in 100 mls @ 100 mls/hr IVPB Q12H 

[facility]


   PRN Reason: Protocol


   Stop: 09/02/18 06:01


   Last Admin: 08/30/18 05:37 Dose:  100 mls/hr


Daptomycin 500 mg/ Sodium (Chloride)  100 mls @ 100 mls/hr IV Q24H [facility]


   PRN Reason: Protocol


   Stop: 09/02/18 14:31


   Last Admin: 08/29/18 13:43 Dose:  100 mls/hr


Dextrose (Dextrose 5% In Water 1000 Ml)  1,000 mls @ 0 mls/hr IV .Q0M PRN; 

Protocol; Per Protocol


   PRN Reason: Hypoglycemia Protocol


Insulin Aspart (Novolog)  0 unit SC ACHS [facility]


   PRN Reason: Protocol


   Last Admin: 08/30/18 08:06 Dose:  4 unit


Lactobacillus Acidophilus (Bacid Acidophilus)  1 cap PO DAILY [facility]


   Last Admin: 08/30/18 09:20 Dose:  1 cap


Multivitamins (Hexavitamin)  1 tab PO DAILY [facility]


   Last Admin: 08/30/18 09:21 Dose:  1 tab


Mupirocin (Bactroban Ointment)  0 gm TOP BID [facility]


   Last Admin: 08/30/18 09:26 Dose:  1 unit


Pantoprazole Sodium (Protonix Ec Tab)  40 mg PO DAILY [facility]


   Last Admin: 08/30/18 09:21 Dose:  40 mg


Sertraline HCl (Zoloft)  150 mg PO DAILY [facility]


   Last Admin: 08/30/18 09:21 Dose:  150 mg











- Labs


Labs: 


 





 08/27/18 13:18 





 08/28/18 07:24 











- Constitutional


Appears: Well, Non-toxic, No Acute Distress





- Extremities Exam


Additional comments: 





Right Lower Extremity, BKA to Left Lower Extremity





VASC: DP 2/4, PT 1/4, CFT delayed to >5 seconds, Temperature gradient WNL to 

right leg, no increase in temperature noted





NEURO: dimished protective sensation knee down





DERM: Discoloration present to the right leg circumferentially from the ankle 

joint to mid-leg. No erythema noted. Minimal edema noted to right leg. 

Hyperpigmentation and discoloration present to the dorsum of the right foot, 

xerosis noted to the right lower extremity with increased xerosis at the 2nd 

digit, 2nd digit- Edematous with erythema improved, 2 cm X 1 cm superficial 

ulceration present to the dorsum of the second digit with a fibro-granular base 

and hyperkeratotic border. No periwound erythema, no drainage noted, no malodor

, no probe to bone, no fluctuance, no tunneling, no tracking, multiple small 

abrasions noted to the dorsum of the 3rd digit and 4th digit with no drainage, 

no malodor, no probe to bone, no fluctuance, no tunneling, no tracking. Wound 

appear to be scabbing over and clinically appear to be healing/improving





ORTHO: ankle range of motion WNL, hallux range of motion within normal limits, 

patient able to minimally flex digits 2-4, pain on palpation to the anterior 

aspect of the right leg. Pain on palpation to digits 2-4, improving





- Neurological Exam


Neurological Exam: Alert, Awake, Oriented x3





- Psychiatric Exam


Psychiatric exam: Normal Affect, Normal Mood





Assessment and Plan





- Assessment and Plan (Free Text)


Assessment: 





52M seen and evaluated at bedside with Dr. Maza for multiple, superficial 

wounds to right dorsal digits 2-4


Plan: 





Patient seen and evaluated with Dr. Maaz


Afebrile


Continue abx per ID


Foot X-ray (8/27)- negative for soft tissue emphysema


LE US (8/27): negative for DVT


ESR- 87


CRP- Ordered


Wound dressed with Bactroban, DSD


No plan for surgical intervention at this time


Podiatry will continue to follow while patient in house

## 2018-08-30 NOTE — CP.PCM.PN
Subjective





- Date & Time of Evaluation


Date of Evaluation: 08/30/18


Time of Evaluation: 12:25





- Subjective


Subjective: 





dictated





Objective





- Vital Signs/Intake and Output


Vital Signs (last 24 hours): 


 











Temp Pulse Resp BP Pulse Ox


 


 98 F   77   20   130/70   96 


 


 08/30/18 07:38  08/30/18 07:38  08/30/18 07:38  08/30/18 09:20  08/30/18 07:38








Intake and Output: 


 











 08/30/18 08/30/18





 06:59 18:59


 


Intake Total 400 


 


Balance 400 














- Medications


Medications: 


 Current Medications





Acetaminophen (Tylenol 325mg Tab)  650 mg PO Q6 PRN


   PRN Reason: Pain, Mild (1-3)


   Last Admin: 08/28/18 00:08 Dose:  650 mg


Aspirin (Aspirin Chewable)  81 mg PO DAILY [facility]


   Last Admin: 08/30/18 09:21 Dose:  81 mg


Dextrose (Dextrose 50% Inj)  0 ml IV STAT PRN; Protocol


   PRN Reason: Hypoglycemia Protocol


Dextrose (Glutose 15)  0 gm PO ONCE PRN; Protocol


   PRN Reason: Hypoglycemia Protocol


Enalapril Maleate (Vasotec)  2.5 mg PO DAILY [facility]


   Last Admin: 08/30/18 09:20 Dose:  2.5 mg


Ferrous Sulfate (Feosol)  325 mg PO DAILY [facility]


   Last Admin: 08/30/18 09:20 Dose:  325 mg


Gabapentin (Neurontin)  100 mg PO QID [facility]


   Last Admin: 08/30/18 09:21 Dose:  100 mg


Glucagon (Glucagen Diagnostic Kit)  0 mg IM STAT PRN; Protocol


   PRN Reason: Hypoglycemia Protocol


Heparin Sodium (Porcine) (Heparin)  5,000 units SC Q12 [facility]


   Last Admin: 08/30/18 09:21 Dose:  5,000 units


Hydromorphone HCl (Dilaudid)  2 mg PO Q6H PRN


   PRN Reason: Pain, severe (8-10)


   Last Admin: 08/29/18 21:35 Dose:  2 mg


Cefepime HCl (Maxipime Iv 2 Gm Premix)  2 gm in 100 mls @ 100 mls/hr IVPB Q12H 

[facility]


   PRN Reason: Protocol


   Stop: 09/02/18 06:01


   Last Admin: 08/30/18 05:37 Dose:  100 mls/hr


Daptomycin 500 mg/ Sodium (Chloride)  100 mls @ 100 mls/hr IV Q24H [facility]


   PRN Reason: Protocol


   Stop: 09/02/18 14:31


   Last Admin: 08/29/18 13:43 Dose:  100 mls/hr


Dextrose (Dextrose 5% In Water 1000 Ml)  1,000 mls @ 0 mls/hr IV .Q0M PRN; 

Protocol; Per Protocol


   PRN Reason: Hypoglycemia Protocol


Insulin Aspart (Novolog)  0 unit SC ACHS [facility]


   PRN Reason: Protocol


   Last Admin: 08/30/18 08:06 Dose:  4 unit


Lactobacillus Acidophilus (Bacid Acidophilus)  1 cap PO DAILY [facility]


   Last Admin: 08/30/18 09:20 Dose:  1 cap


Multivitamins (Hexavitamin)  1 tab PO DAILY [facility]


   Last Admin: 08/30/18 09:21 Dose:  1 tab


Mupirocin (Bactroban Ointment)  0 gm TOP BID [facility]


   Last Admin: 08/30/18 09:26 Dose:  1 unit


Pantoprazole Sodium (Protonix Ec Tab)  40 mg PO DAILY [facility]


   Last Admin: 08/30/18 09:21 Dose:  40 mg


Sertraline HCl (Zoloft)  150 mg PO DAILY [facility]


   Last Admin: 08/30/18 09:21 Dose:  150 mg











- Labs


Labs: 


 





 08/30/18 10:37 





 08/28/18 07:24

## 2018-08-30 NOTE — CP.PCM.DIS
Provider





- Provider


Date of Admission: 


08/27/18 15:31





Attending physician: 


Liliane Brito MD





Primary care physician: 





Dr. Brito


Consults: 





Podiatry: Dr. Maza


ID; Dr. Willoughby


Time Spent in preparation of Discharge (in minutes): 45





Diagnosis





- Discharge Diagnosis


(1) Diabetic neuropathy


Status: Chronic   





(2) Anemia


Status: Chronic   





(3) Cellulitis


Status: Acute   





(4) Diabetes


Status: Chronic   





(5) History of left below knee amputation


Status: Chronic   





Hospital Course





- Lab Results


Lab Results: 


 Micro Results





08/27/18 14:04   Blood   Blood Culture - Preliminary


                            NO GROWTH AFTER 3 DAYS


08/27/18 14:04   Blood   Blood Culture - Preliminary


                            NO GROWTH AFTER 3 DAYS





 Most Recent Lab Values











WBC  5.3 K/uL (4.8-10.8)   08/30/18  10:37    


 


RBC  2.60 Mil/uL (4.40-5.90)  L  08/30/18  10:37    


 


Hgb  7.8 g/dL (12.0-18.0)  L  08/30/18  10:37    


 


Hct  23.1 % (35.0-51.0)  L  08/30/18  10:37    


 


MCV  89.1 fL (80.0-94.0)   08/30/18  10:37    


 


MCH  30.0 pg (27.0-31.0)   08/30/18  10:37    


 


MCHC  33.6 g/dL (33.0-37.0)   08/30/18  10:37    


 


RDW  14.2 % (11.5-14.5)   08/30/18  10:37    


 


Plt Count  231 K/uL (130-400)   08/30/18  10:37    


 


MPV  7.5 fL (7.2-11.7)   08/30/18  10:37    


 


Neut % (Auto)  45.6 % (50.0-75.0)  L  08/30/18  10:37    


 


Lymph % (Auto)  35.6 % (20.0-40.0)   08/30/18  10:37    


 


Mono % (Auto)  6.0 % (0.0-10.0)   08/30/18  10:37    


 


Eos % (Auto)  11.5 % (0.0-4.0)  H  08/30/18  10:37    


 


Baso % (Auto)  1.3 % (0.0-2.0)   08/30/18  10:37    


 


Neut # (Auto)  2.4 K/uL (1.8-7.0)   08/30/18  10:37    


 


Lymph # (Auto)  1.9 K/uL (1.0-4.3)   08/30/18  10:37    


 


Mono # (Auto)  0.3 K/uL (0.0-0.8)   08/30/18  10:37    


 


Eos # (Auto)  0.6 K/uL (0.0-0.7)   08/30/18  10:37    


 


Baso # (Auto)  0.1 K/uL (0.0-0.2)   08/30/18  10:37    


 


ESR  87 mm/hr (0-15)  H  08/27/18  13:18    


 


Sodium  139 mmol/L (132-148)   08/30/18  10:37    


 


Potassium  4.7 mmol/L (3.6-5.2)   08/30/18  10:37    


 


Chloride  106 mmol/L ()   08/30/18  10:37    


 


Carbon Dioxide  22 mmol/L (22-30)   08/30/18  10:37    


 


Anion Gap  16  (10-20)   08/30/18  10:37    


 


BUN  32 mg/dL (9-20)  H  08/30/18  10:37    


 


Creatinine  1.7 mg/dL (0.8-1.5)  H  08/30/18  10:37    


 


Est GFR ( Amer)  52   08/30/18  10:37    


 


Est GFR (Non-Af Amer)  43   08/30/18  10:37    


 


POC Glucose (mg/dL)  333 mg/dL ()  H  08/30/18  11:17    


 


Random Glucose  325 mg/dL ()  H  08/30/18  10:37    


 


Calcium  8.9 mg/dl (8.6-10.4)   08/30/18  10:37    


 


Phosphorus  3.1 mg/dL (2.5-4.5)   08/30/18  10:37    


 


Magnesium  1.3 mg/dL (1.6-2.3)  L  08/30/18  10:37    


 


Iron  48 ug/dL ()  L  08/29/18  17:07    


 


TIBC  258 ug/dL (250-450)   08/29/18  17:07    


 


% Saturation  19  (20-55)  L  08/29/18  17:07    


 


Total Bilirubin  0.3 mg/dL (0.2-1.3)   08/30/18  10:37    


 


AST  15 U/L (17-59)  L  08/30/18  10:37    


 


ALT  23 U/L (21-72)   08/30/18  10:37    


 


Alkaline Phosphatase  78 U/L ()   08/30/18  10:37    


 


Total Creatine Kinase  79 U/L ()   08/27/18  13:18    


 


Total Protein  6.4 g/dL (6.3-8.3)   08/30/18  10:37    


 


Albumin  3.4 g/dL (3.5-5.0)  L  08/30/18  10:37    


 


Globulin  3.0 gm/dL (2.2-3.9)   08/30/18  10:37    


 


Albumin/Globulin Ratio  1.2  (1.0-2.1)   08/30/18  10:37    


 


Random Vancomycin  7.2 ug/mL  08/28/18  11:16    














- Hospital Course


Hospital Course: 





H&P: 51 y/o male with PMHx of Anemia, Depression, and Type II Diabetes 

presented to the ED due to increasing redness and swelling to his right lower 

extremity with multiple wounds and abrasions. Patient states he saw his 

podiatrist, Dr. Maza, 1 week ago due to the wounds on his right sided digits. 

Patient states he was prescribed antibiotics at that time, which he is still 

taking. Patient's wounds was dressed with Bactroban and DSD at that time. 

Patient reports removing the dressing at home after 1 days and applying a 

[place] dressing to the site. Patient reports the redness and swelling to 

his right leg worsened in the last 2-3 days and he decided to come in to the 

ED. Patient denies any fever, nausea, vomiting, SOB, or chest pain. 





Briefly, on admission, patient was noted to have multiple wounds on dorsum of 

left foot.  Patient was afebrile with normal WBC count on admission.  LE venous 

Duplex was negative for DVT.  Left foot x ray was negative for emphysematous 

changes.  Podiatry, Dr. Maza was consulted and recommended local wound care 

with no plan for surgical intervention at this time.  Infectious disease, Dr. Willoughby was also consulted. patient was started on antibiotics (cefepime and 

daptomycin).  Per ID, patient requires long term IV antibiotics.  Patient had 

PICC line placed on 8/29/18 and will be discharged on Rocephin 2 gm [id number] 

per ID recs.  Patient was deemed stable for discharge per Dr. Feliciano. 





Patient stable for discharge home per Dr. Feliciano


Patient is to follow up with primary care doctor, Dr. Brito upon discharge.  


Patient is to follow up with Infectious disease doctor, Dr. Willoughby upon 

discharge in 2 weeks.  Referral is provided for patient.  


Patient is also to follow up with Hematologist, Dr. Ernst upon discharge.  

Referral is provided.  


Patient will require 38 days of IV antibiotics via PICC line.  


Patient will continue Rocephin 2 gm IV every 24 hours for 38 days.  He will 

receive this at the infusion center.  


Patient will need weekly blood work including CBC, CMP, ESR CRP for 3 weeks.  

Results to be faxed to Dr. Brito and Dr. Willoughby.  


Patient will require local wound care in right lower extremity with bactroban, 

dry sterile wrap and cling.  


Continue taking home medications as prescribed.  Patient given refill scripts 

for the following medications: Gabapentin 100 mg po QID, Enalapril 2.5 mg po QD

, Protonix 40 mg po QD, Atorvastatin 10 mg po HS, Sodium Bicarbonate 650 mg po 

BID, Ferrous sulfate 325 mg po QD.


Patient is also given script for lower extremity compression stockings to be 

worn daily.  Please elevate lower extremity to help with swelling.  


Please return to ED if symptoms worsen.





- Date & Time of H&P


Date of H&P: 08/30/18


Time of H&P: 15:18





Discharge Exam





- Head Exam


Head Exam: ATRAUMATIC, NORMOCEPHALIC





- ENT Exam


ENT Exam: Mucous Membranes Moist





- Respiratory Exam


Respiratory Exam: Clear to PA & Lateral, NORMAL BREATHING PATTERN.  absent: 

Rales, Rhonchi, Wheezes





- Cardiovascular Exam


Cardiovascular Exam: REGULAR RHYTHM, +S1, +S2.  absent: Diastolic murmur, Gallop

, Rubs, Systolic Murmur





- GI/Abdominal Exam


GI & Abdominal Exam: Normal Bowel Sounds, Soft.  absent: Distended, Firm, 

Guarding, Tenderness





- Neurological Exam


Neurological exam: Alert, Oriented x3





- Psychiatric Exam


Psychiatric exam: Normal Affect, Normal Mood





- Skin


Additional comments: 





left LE vascular insufficiency noted.  Left foot is bandaged.  RBKA noted 





Discharge Plan





- Discharge Medications


Prescriptions: 


Atorvastatin [Lipitor] 10 mg PO DIN #30 tab


cefTRIAXone [Rocephin] 2 gm IVPB DAILY #1 vial


Compression Panty, Lrg-Ext Lrg [Post Op Panty] 1 each MC DAILY #1 each


Enalapril Maleate [Vasotec] 2.5 mg PO DAILY #30 tab


Ferrous Sulfate [Feosol] 325 mg PO DAILY #30 tab


Gabapentin [Neurontin] 100 mg PO QID #120 cap


L.acidoph,Paracasei, B.lactis [Probiotic] 1 each PO DAILY #30 capsule


Pantoprazole [Protonix EC Tab] 40 mg PO DAILY #30 ect


Sodium Bicarbonate 650 mg PO BID #120 tablet





- Follow Up Plan


Condition: GOOD


Disposition: HOME/ ROUTINE


Additional Instructions: 


Patient stable for discharge home per Dr. Feliciano


Patient is to follow up with primary care doctor, Dr. Brito upon discharge.  


Patient is to follow up with Infectious disease doctor, Dr. Willoughby upon 

discharge in 2 weeks.  Referral is provided for patient.  


Patient is also to follow up with Hematologist, Dr. Ernst upon discharge.  

Referral is provided.  


Patient will require 38 days of IV antibiotics via PICC line.  


Patient will continue Rocephin 2 gm IV every 24 hours for 38 days.  He will 

receive this at the infusion center.  


Patient will need weekly blood work including CBC, CMP, ESR CRP for 3 weeks.  

Results to be faxed to Dr. Brito and Dr. Willoughby.  


Patient will require local wound care in right lower extremity with bactroban, 

dry sterile wrap and cling.  


Continue taking home medications as prescribed.  Patient given refill scripts 

for the following medications: Gabapentin 100 mg po QID, Enalapril 2.5 mg po QD

, Protonix 40 mg po QD, Atorvastatin 10 mg po HS, Sodium Bicarbonate 650 mg po 

BID.


Patient is also given script for lower extremity compression stockings to be 

worn daily.  Please elevate lower extremity to help with swelling.  


Please return to ED if symptoms worsen.  


Referrals: 


Aquilino Ernst MD [Staff Provider] - 


Leonor Willoughby MD [Staff Provider] -

## 2018-08-30 NOTE — CP.PCM.PN
Subjective





- Date & Time of Evaluation


Date of Evaluation: 08/30/18


Time of Evaluation: 09:41





- Subjective


Subjective: 





PGY3 progress note for Dr. Feliciano, covering for Dr. Brito





Objective





- Vital Signs/Intake and Output


Vital Signs (last 24 hours): 


 











Temp Pulse Resp BP Pulse Ox


 


 98 F   77   20   130/70   96 


 


 08/30/18 07:38  08/30/18 07:38  08/30/18 07:38  08/30/18 09:20  08/30/18 07:38








Intake and Output: 


 











 08/30/18 08/30/18





 06:59 18:59


 


Intake Total 400 


 


Balance 400 














- Medications


Medications: 


 Current Medications





Acetaminophen (Tylenol 325mg Tab)  650 mg PO Q6 PRN


   PRN Reason: Pain, Mild (1-3)


   Last Admin: 08/28/18 00:08 Dose:  650 mg


Aspirin (Aspirin Chewable)  81 mg PO DAILY [facility]


   Last Admin: 08/30/18 09:21 Dose:  81 mg


Enalapril Maleate (Vasotec)  2.5 mg PO DAILY [facility]


   Last Admin: 08/30/18 09:20 Dose:  2.5 mg


Ferrous Sulfate (Feosol)  325 mg PO DAILY [facility]


   Last Admin: 08/30/18 09:20 Dose:  325 mg


Gabapentin (Neurontin)  100 mg PO QID [facility]


   Last Admin: 08/30/18 09:21 Dose:  100 mg


Heparin Sodium (Porcine) (Heparin)  5,000 units SC Q12 [facility]


   Last Admin: 08/30/18 09:21 Dose:  5,000 units


Hydromorphone HCl (Dilaudid)  2 mg PO Q6H PRN


   PRN Reason: Pain, severe (8-10)


   Last Admin: 08/29/18 21:35 Dose:  2 mg


Cefepime HCl (Maxipime Iv 2 Gm Premix)  2 gm in 100 mls @ 100 mls/hr IVPB Q12H 

[facility]


   PRN Reason: Protocol


   Stop: 09/02/18 06:01


   Last Admin: 08/30/18 05:37 Dose:  100 mls/hr


Daptomycin 500 mg/ Sodium (Chloride)  100 mls @ 100 mls/hr IV Q24H [facility]


   PRN Reason: Protocol


   Stop: 09/02/18 14:31


   Last Admin: 08/29/18 13:43 Dose:  100 mls/hr


Insulin Aspart (Novolog)  0 unit SC ACHS [facility]


   PRN Reason: Protocol


   Last Admin: 08/30/18 08:06 Dose:  4 unit


Lactobacillus Acidophilus (Bacid Acidophilus)  1 cap PO DAILY [facility]


   Last Admin: 08/30/18 09:20 Dose:  1 cap


Multivitamins (Hexavitamin)  1 tab PO DAILY [facility]


   Last Admin: 08/30/18 09:21 Dose:  1 tab


Mupirocin (Bactroban Ointment)  0 gm TOP BID [facility]


   Last Admin: 08/30/18 09:26 Dose:  1 unit


Pantoprazole Sodium (Protonix Ec Tab)  40 mg PO DAILY [facility]


   Last Admin: 08/30/18 09:21 Dose:  40 mg


Sertraline HCl (Zoloft)  150 mg PO DAILY [facility]


   Last Admin: 08/30/18 09:21 Dose:  150 mg











- Labs


Labs: 


 





 08/27/18 13:18 





 08/28/18 07:24 











Assessment and Plan





- Assessment and Plan (Free Text)


Assessment: 





52 year old male with past medical history of DM, anemia, depression is 

admitted for right foot cellulitis





Right foot cellulitis


- On admission, pt was febrile and normal WBC


- Blood cultures negative 


- Foot x ray showed no emphymetuous changes.  


- ESR 87.  CRP pending 


- Podiatry, Dr. Maza is consulted.  Rec local wound care.  No plan for 

surgical intervention at this time


- Continue Abx Cefepime and daptomycin. Continue lactobacillus


- Continue wound care 


- Tylenol prn and dilaudid 2 mg po q6 prn for pain 


- Pt had PICC line placed yesterday for long term abxs


- Continue PT





DM


- Accuchecks ACHS


- ISS


- Hypoglycemic protocol


- Will check Hgb A1c and lipid panel 





Diabetic neuropathy


- Continue home medication gabapentin  100 mg po QID


- Will consider checking arterial dopplers 


- Continue Aspirin 81 mg po qd 





HTN


- Continue home medication Vasotec 2.5 mg po qd





Anemia


- Will continue to monitor H&H


- iron studies low


- Ferrous sulfate 325 po qd 





Depression


- Continue home medication Zoloft 150 mg po qd





Prophylaxis


- Continue heparin sc q12 


- protonix 





Case will be discussed with attending, Dr. Feliciano

## 2018-08-30 NOTE — PN
Copied To:  Liliane Brito MD

Attending MD:  Liliane Brito MD



DATE:  08/29/2018



SUBJECTIVE:  The patient was seen on 08/29/2018.  He was not in any

cardiopulmonary distress.



PHYSICAL EXAMINATION:

VITAL SIGNS:  Blood pressure 145/83, temperature 98.1, respiratory rate 20,

and pulse 78.

HEENT:  Pupils equal, reactive to light.  Normal-appearing mucosa of the

conjunctivae, oropharynx and nasal membrane mucosa.

NECK:  Supple.  No JVD.  No carotid bruit.  No lymph node.  No thyromegaly.

CHEST AND LUNGS:  Bilateral symmetrical expansion.  Good air exchange.  No

rales.  No rhonchi.

CARDIOVASCULAR SYSTEM:  PMI not localized.  S1 and S2.  No additional

sounds.

ABDOMEN:  Normoactive bowel sounds.  No tenderness.  No organomegaly.  No

masses.

EXTREMITIES:  No cyanosis.  No clubbing.  Right foot has superficial wounds

on the second and fourth digits.  Left BKA.  Cellulitis of the right lower

extremity is improving.

CNS:  Alert, awake, oriented x3.  No neurological deficit could be

appreciated.



ASSESSMENT:  Cellulitis of the right lower extremity with superficial

wounds of the right foot, peripheral vascular disease, status post left

below-the-knee amputation; type 2 diabetes mellitus, uncontrolled; chronic

kidney disease stage III; anemia of chronic disease.



PLAN:  Start the patient on Procrit and continue current antibiotics and

follow recommendations of infectious disease consultants.



I will be away from 08/30/2018 to 09/03/2018.  Hospitalists are covering

for me.







__________________________________________

Liliane Brito MD



DD:  08/30/2018 7:22:53

DT:  08/30/2018 7:25:04

Job # [id number]

## 2018-08-31 VITALS — OXYGEN SATURATION: 99 %

## 2018-10-16 ENCOUNTER — HOSPITAL ENCOUNTER (EMERGENCY)
Dept: HOSPITAL 31 - C.ER | Age: 53
Discharge: HOME | End: 2018-10-16
Payer: MEDICARE

## 2018-10-16 VITALS — HEART RATE: 74 BPM | DIASTOLIC BLOOD PRESSURE: 89 MMHG | OXYGEN SATURATION: 99 % | SYSTOLIC BLOOD PRESSURE: 153 MMHG

## 2018-10-16 VITALS — TEMPERATURE: 98.6 F | RESPIRATION RATE: 20 BRPM

## 2018-10-16 VITALS — BODY MASS INDEX: 24.7 KG/M2
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
## 2018-10-16 DIAGNOSIS — S29.011A: Primary | ICD-10-CM

## 2018-10-16 NOTE — C.PDOC
History Of Present Illness


53 y/o male presents to the ED complaining of left lower rib pain for 

approximately 2 days. Patient states he currently has a cold, with occasional 

cough. Yesterday patient was lifting heavy garbage bags, exacerbating pain to 

the area. Pain is localized and worsens with movement, coughing, and deep 

inspiration. States he has had no relief with over-the-counter patches. 





Time Seen by Provider: 10/16/18 17:55


Chief Complaint (Nursing): Rib Injury


History Per: Patient


History/Exam Limitations: no limitations


Onset/Duration Of Symptoms: Days


Current Symptoms Are (Timing): Still Present





Past Medical History


Reviewed: Historical Data, Nursing Documentation, Vital Signs


Vital Signs: 





                                Last Vital Signs











Temp  98.6 F   10/16/18 17:34


 


Pulse  86   10/16/18 17:34


 


Resp  20   10/16/18 17:34


 


BP  156/84 H  10/16/18 17:34


 


Pulse Ox  98   10/16/18 17:34














- Medical History


PMH: Anemia, Depression, Diabetes, Fractures (Broke hip 2014 or 2015)


   Denies: Crohn's Disease, HTN (PT DENIES), Chronic Kidney Disease





- [facility] Procedures











CLOSED RED-INT FIX FEMUR (06/24/14)


FLUOROSCOPY OF SUPERIOR VENA CAVA, GUIDANCE (10/26/16)


INSERTION OF INFUSION DEV INTO SUP VENA CAVA, PERC APPROACH (08/27/18)








Family History: States: Unknown Family Hx





- Social History


Hx Tobacco Use: Yes


Hx Alcohol Use: No


Hx Substance Use: No





- Immunization History


Hx Tetanus Toxoid Vaccination: Yes


Hx Influenza Vaccination: No


Hx Pneumococcal Vaccination: No





Review Of Systems


Except As Marked, All Systems Reviewed And Found Negative.


Constitutional: Negative for: Fever, Chills


Eyes: Negative for: Vision Change


ENT: Positive for: Nose Congestion


Cardiovascular: Positive for: Other (left rib pain).  Negative for: 

Palpitations, Light Headedness


Respiratory: Positive for: Cough.  Negative for: Shortness of Breath, SOB with 

Excertion


Gastrointestinal: Negative for: Nausea, Vomiting


Neurological: Negative for: Weakness, Dizziness





Physical Exam





- Physical Exam


Appears: Non-toxic, No Acute Distress


Skin: Normal Color, Warm, Dry


Head: Atraumatic, Normacephalic


Eye(s): bilateral: Normal Inspection, PERRL, EOMI


Oral Mucosa: Moist


Neck: Normal ROM


Chest: Symmetrical (appear atraumatic, no ecchymosis or swelling), Tenderness 

(Diffuse tenderness over the lateral lower left chest wall; No crepitus; Skin is

intact)


Cardiovascular: Rhythm Regular, No Murmur


Respiratory: Normal Breath Sounds, No Rales, No Rhonchi, No Wheezing, Other 

(NARD)


Extremity: Bilateral: Atraumatic, Normal Color And Temperature, Normal ROM


Pulses: Left Radial: Normal, Right Radial: Normal


Neurological/Psych: Oriented x3, Normal Speech


Gait: Steady





ED Course And Treatment


O2 Sat by Pulse Oximetry: 98 (RA)


Pulse Ox Interpretation: Normal





Medical Decision Making


Medical Decision Making: 





Plan:


Flexeril 10 mg PO


Lidoderm patch x1


Motrin 600 mg PO


Tessalon perles 200mg PO





Patient reports improvement after treatment given. He remains afebrile, AAOx3, 

in no acute distress. VSS.


Counseled regarding diagnosis and follow-up instructions. 





Disposition


Counseled Patient/Family Regarding: Diagnosis, Need For Followup, Rx Given





- Disposition


Referrals: 


YOUR,PMD [Other]


Disposition: HOME/ ROUTINE


Disposition Time: 18:05


Condition: IMPROVED


Additional Instructions: 


APPLY PATCH TO AFFECTED AREA. MAX 3 PATCHES AT A TIME.


REMOVE PATCH 12 HOURS AFTER INITIAL APPLICATION. ALTERNATE 12 HOURS ON, 12 HOURS

 OFF.








Prescriptions: 


Benzonatate [Tessalon Perles] 200 mg PO TID PRN #15 sgl


 PRN Reason: Cough


Cyclobenzaprine [Flexeril] 10 mg PO TID #15 tab


Ibuprofen [Motrin] 600 mg PO Q6 #30 tab


Lidocaine 5% [Lidoderm] 1 ea TD PRN PRN #10 patch


 PRN Reason: Pain, Moderate (4-7)


Instructions:  Pleuritic Chest Pain (DC)


Forms:  [facility] (English)





- POA


Present On Arrival: None





- Clinical Impression


Clinical Impression: 


 Chest wall muscle strain








- Scribe Statement


The provider has reviewed the documentation as recorded by the Scribe (Debbie Skaggs)


Provider Attestation: 





All medical record entries made by the Scribe were at my direction and 

personally dictated by me. I have reviewed the chart and agree that the record 

accurately reflects my personal performance of the history, physical exam, 

medical decision making, and the department course for this patient. I have also

 personally directed, reviewed, and agree with the discharge instructions and 

disposition.

## 2018-11-06 ENCOUNTER — HOSPITAL ENCOUNTER (EMERGENCY)
Dept: HOSPITAL 31 - C.ER | Age: 53
Discharge: HOME | End: 2018-11-06
Payer: MEDICARE

## 2018-11-06 VITALS
HEART RATE: 85 BPM | TEMPERATURE: 97.8 F | DIASTOLIC BLOOD PRESSURE: 86 MMHG | OXYGEN SATURATION: 98 % | SYSTOLIC BLOOD PRESSURE: 136 MMHG

## 2018-11-06 VITALS — RESPIRATION RATE: 18 BRPM

## 2018-11-06 VITALS — BODY MASS INDEX: 24.7 KG/M2

## 2018-11-06 DIAGNOSIS — Z45.2: Primary | ICD-10-CM

## 2018-11-06 NOTE — C.PDOC
History Of Present Illness





52-year-old male, presents to the emergency department for PICC line removal 

from left arm. Patient dnotes he has been experiencing two-week duration of left

sided rib pain. he was given Motrin and Flexeril with no relief. 


Time Seen by Provider: 11/06/18 11:37


Chief Complaint (Nursing): Vascular Access Device Problem


History Per: Patient


History/Exam Limitations: no limitations





Past Medical History


Reviewed: Historical Data, Nursing Documentation, Vital Signs


Vital Signs: 





                                Last Vital Signs











Temp  98.1 F   11/06/18 11:28


 


Pulse  90   11/06/18 11:28


 


Resp  18   11/06/18 11:28


 


BP  127/81   11/06/18 11:28


 


Pulse Ox  97   11/06/18 11:28














- Medical History


PMH: Anemia, Depression, Diabetes, Fractures (left hip fx)


   Denies: Crohn's Disease, HTN (PT DENIES), Chronic Kidney Disease





- [facility] Procedures











CLOSED RED-INT FIX FEMUR (06/24/14)


FLUOROSCOPY OF SUPERIOR VENA CAVA, GUIDANCE (10/26/16)


INSERTION OF INFUSION DEV INTO SUP VENA CAVA, PERC APPROACH (08/27/18)








Family History: States: No Known Family Hx





- Social History


Hx Tobacco Use: Yes


Hx Alcohol Use: No


Hx Substance Use: No





- Immunization History


Hx Tetanus Toxoid Vaccination: Yes


Hx Influenza Vaccination: No


Hx Pneumococcal Vaccination: No





Review Of Systems


Constitutional: Negative for: Fever


Cardiovascular: Positive for: Other (left rib pain )


Gastrointestinal: Negative for: Nausea, Vomiting


Skin: Negative for: Rash





Physical Exam





- Physical Exam


Appears: Non-toxic, No Acute Distress


Skin: Warm, Dry, No Rash


Head: Atraumatic


Eye(s): bilateral: Normal Inspection


Nose: Normal


Oral Mucosa: Moist


Lips: Normal Appearing


Neck: Normal ROM


Chest: Tenderness (left lateral ribs)


Cardiovascular: Rhythm Regular, No Murmur


Respiratory: Normal Breath Sounds, No Accessory Muscle Use, Other (no apparent 

distress)


Extremity: Other (left arm picc line)


Neurological/Psych: Oriented x3, Normal Speech





ED Course And Treatment


O2 Sat by Pulse Oximetry: 97


Pulse Ox Interpretation: Normal (RA)


Progress Note: PICC line removed from left arm by picc nurse.  CXR ordered and 

reviewed





Disposition





- Disposition





- Scribe Statement


The provider has reviewed the documentation as recorded by the Scribe (Zunaira 

Ella)


Provider Attestation: 








All medical record entries made by the Scribe were at my direction and 

personally dictated by me. I have reviewed the chart and agree that the record 

accurately reflects my personal performance of the history, physical exam, 

medical decision making, and the department course for this patient. I have also

personally directed, reviewed, and agree with the discharge instructions and 

disposition.

## 2018-11-06 NOTE — RAD
HISTORY:

 LEFT SIDED RIB PAIN 



COMPARISON:

Chest x-ray performed 8/29/18 



TECHNIQUE:

Chest PA and lateral



FINDINGS:





LUNGS:

Increased lucencies especially within the bilateral upper lung fields 

compatible with underlying emphysema. No focal consolidation.



Please note that chest x-ray has limited sensitivity for the 

detection of pulmonary masses.



PLEURA:

No significant pleural effusion identified. No definite pneumothorax .



CARDIOVASCULAR:

The cardiomediastinal silhouette appears within normal limits of 

size. No atherosclerotic calcification present.



OSSEOUS STRUCTURES:

Degenerative changes.  Osseous demineralization.



VISUALIZED UPPER ABDOMEN:

Unremarkable.



OTHER FINDINGS:

None.



IMPRESSION:

Emphysematous changes.

## 2018-11-06 NOTE — C.PDOC
Time Seen by Provider: 11/06/18 11:37


Chief Complaint (Nursing): Vascular Access Device Problem





Past Medical History


Vital Signs: 





                                Last Vital Signs











Temp  98.1 F   11/06/18 11:28


 


Pulse  90   11/06/18 11:28


 


Resp  18   11/06/18 11:28


 


BP  127/81   11/06/18 11:28


 


Pulse Ox  97   11/06/18 11:28














- Medical History


PMH: Anemia, Depression, Diabetes, Fractures (left hip fx)


   Denies: Crohn's Disease, HTN (PT DENIES), Chronic Kidney Disease





- [facility] Procedures











CLOSED RED-INT FIX FEMUR (06/24/14)


FLUOROSCOPY OF SUPERIOR VENA CAVA, GUIDANCE (10/26/16)


INSERTION OF INFUSION DEV INTO SUP VENA CAVA, PERC APPROACH (08/27/18)








Family History: States: Unknown Family Hx





- Social History


Hx Tobacco Use: Yes


Hx Alcohol Use: No


Hx Substance Use: No





- Immunization History


Hx Tetanus Toxoid Vaccination: Yes


Hx Influenza Vaccination: No


Hx Pneumococcal Vaccination: No





ED Course And Treatment


O2 Sat by Pulse Oximetry: 97





Disposition


Counseled Patient/Family Regarding: Studies Performed, Diagnosis, Need For 

Followup





- Disposition


Referrals: 


Liliane Brito MD [Staff Provider] - 


Disposition: HOME/ ROUTINE


Disposition Time: 12:30


Condition: STABLE


Additional Instructions: 


FOLLOW UP WITH YOUR DOCTOR IN 1-2 DAYS





RETURN TO ER IF YOU HAVE ANY CONCERNING SYMPTOMS


Instructions:  Peripherally-Inserted Central Catheter Removal


Forms:  [facility] (English), General Discharge Instructions


Print Language: ENGLISH





- Clinical Impression


Clinical Impression: 


 PIC line (peripherally inserted central catheter) removal

## 2019-01-01 NOTE — CP.PCM.PN
Subjective





- Date & Time of Evaluation


Date of Evaluation: 08/28/18


Time of Evaluation: 09:45





- Subjective


Subjective: 





Podiatry Progress Note for Dr. Maza





52M seen and evaluated at bedside for multiple, superficial wounds to right 

dorsal digits 2-4 along with cellulitic changes. Patient is AAO x 3 and NAD at 

time of visit. States that pain is well controlled. Denies any acute overnight 

events or new pedal complaints at this time. Denies any recent N/V/F/C/CP/SOB/

D. Patient is well known to Dr. Maza





Objective





- Vital Signs/Intake and Output


Vital Signs (last 24 hours): 


 











Temp Pulse Resp BP Pulse Ox


 


 98.4 F   85   20   156/87 H  96 


 


 08/28/18 07:34  08/28/18 07:34  08/28/18 07:34  08/28/18 07:34  08/28/18 07:34











- Medications


Medications: 


 Current Medications





Acetaminophen (Tylenol 325mg Tab)  650 mg PO Q6 PRN


   PRN Reason: Pain, Mild (1-3)


   Last Admin: 08/28/18 00:08 Dose:  650 mg


Aspirin (Aspirin Chewable)  81 mg PO DAILY [facility]


Enalapril Maleate (Vasotec)  2.5 mg PO DAILY [facility]


Gabapentin (Neurontin)  100 mg PO QID [facility]


Heparin Sodium (Porcine) (Heparin)  5,000 units SC Q12 [facility]


Cefepime HCl (Maxipime Iv 2 Gm Premix)  2 gm in 100 mls @ 100 mls/hr IVPB Q12H 

[facility]


   PRN Reason: Protocol


   Stop: 09/02/18 06:01


   Last Admin: 08/28/18 05:43 Dose:  100 mls/hr


Insulin Aspart (Novolog)  0 unit SC ACHS [facility]


   PRN Reason: Protocol


   Last Admin: 08/28/18 07:57 Dose:  3 unit


Lactobacillus Acidophilus (Bacid Acidophilus)  1 cap PO DAILY [facility]


Multivitamins (Hexavitamin)  1 tab PO DAILY [facility]


Mupirocin (Bactroban Ointment)  0 gm TOP QAM [facility]


Pantoprazole Sodium (Protonix Ec Tab)  40 mg PO DAILY [facility]


Pneumococcal Polyvalent Vaccine (Pneumovax 23 Vaccine)  0.5 ml IM .ONCE ONE


   Stop: 08/29/18 10:01


Sertraline HCl (Zoloft)  150 mg PO DAILY [facility]











- Labs


Labs: 


 





 08/27/18 13:18 





 08/28/18 07:24 











- Constitutional


Appears: Well, Non-toxic, No Acute Distress





- Extremities Exam


Additional comments: 





Right Lower Extremity, BKA to Left Lower Extremity





VASC: DP 2/4, PT 1/4, CFT delayed to >5 seconds, Temperature gradient WNL to 

right leg, no increase in temperature noted





NEURO: dimished protective sensation knee down





DERM: Minimal erythema and discoloration present to the right leg 

circumferentially from the ankle joint to mid-leg. Minimal edema noted to right 

leg. Hyperpigmentation and discoloration present to the dorsum of the right foot

, xerosis noted to the right lower extremity with increased xerosis at the 2nd 

digit, 2nd digit- increasingly swollen with eythema, 2 cm X 1 cm superficial 

ulceration present to the dorsum of the second digit with a fibro-granular base

, ean-wound erythema and hyperkeratotic border, no drainage noted, no malodor, 

no probe to bone, no fluctuance, no tunneling, no tracking, multiple small 

abrasions noted to the dorsum of the 3rd digit and 4th digit with no drainage, 

no malodor, no probe to bone, no fluctuance, no tunneling, no tracking. 





ORTHO: ankle range of motion WNL, hallux range of motion within normal limits, 

patient unable to move digits 2, 3, and 4 due to severe swelling, pain on 

palpation to the anterior aspect of the right leg. Pain on palpation to digits 2

-4





- Neurological Exam


Neurological Exam: Alert, Awake, Oriented x3





- Psychiatric Exam


Psychiatric exam: Normal Affect, Normal Mood





Assessment and Plan





- Assessment and Plan (Free Text)


Assessment: 





52M seen and evaluated at bedside for multiple, superficial wounds to right 

dorsal digits 2-4 along with cellulitic changes to foot and leg


Plan: 





Patient seen and evaluated with Dr. Maza


Afebrile, absent leukocytosis on last CBC


Continue abx per ID


Foot X-ray (8/27)- negative for soft tissue emphysema


LE US (8/27): negative for DVT


ESR- 87


CRP- Ordered


Bactroban ordered but not present with nurse or at bedside


Wound dressed with DSD for time being


No plan for surgical intervention at this time


Patient advised to remain NWB for time being


Podiatry will continue to follow while patient in house [facility]

## 2019-01-08 ENCOUNTER — HOSPITAL ENCOUNTER (EMERGENCY)
Dept: HOSPITAL 31 - C.ER | Age: 54
Discharge: HOME | End: 2019-01-08
Payer: MEDICARE

## 2019-01-08 VITALS
SYSTOLIC BLOOD PRESSURE: 114 MMHG | TEMPERATURE: 98.1 F | DIASTOLIC BLOOD PRESSURE: 62 MMHG | OXYGEN SATURATION: 98 % | HEART RATE: 74 BPM

## 2019-01-08 VITALS — BODY MASS INDEX: 24.3 KG/M2

## 2019-01-08 VITALS — RESPIRATION RATE: 20 BRPM

## 2019-01-08 DIAGNOSIS — Z72.0: ICD-10-CM

## 2019-01-08 DIAGNOSIS — S82.045A: Primary | ICD-10-CM
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
## 2019-01-08 DIAGNOSIS — E11.9: ICD-10-CM
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
## 2019-01-08 DIAGNOSIS — Z89.512: ICD-10-CM

## 2019-01-08 PROCEDURE — 97116 GAIT TRAINING THERAPY: CPT

## 2019-01-08 PROCEDURE — 97161 PT EVAL LOW COMPLEX 20 MIN: CPT

## 2019-01-08 PROCEDURE — 73562 X-RAY EXAM OF KNEE 3: CPT

## 2019-01-08 PROCEDURE — 99284 EMERGENCY DEPT VISIT MOD MDM: CPT

## 2019-01-08 NOTE — C.PDOC
History Of Present Illness


Patient is a 52 y/o male with a PMHx of diabetes and s/p left BKA. He presents 

today for evaluation of injury to the BKA site. Patient states that last night 

he tripped at home and fell onto his left knee. Now complaining of pain and 

swelling to the left knee. No other injuries. He denies any head trauma or LOC.





Time Seen by Provider: 01/08/19 11:16


Chief Complaint (Nursing): Lower Extremity Problem/Injury


History Per: Patient


History/Exam Limitations: no limitations


Onset/Duration Of Symptoms: Days (x  1)


Current Symptoms Are (Timing): Still Present





Past Medical History


Reviewed: Historical Data, Nursing Documentation, Vital Signs


Vital Signs: 





                                Last Vital Signs











Temp  97.6 F   01/08/19 10:42


 


Pulse  96 H  01/08/19 10:42


 


Resp  20   01/08/19 10:42


 


BP  137/74   01/08/19 10:42


 


Pulse Ox  97   01/08/19 10:42














- Medical History


PMH: Anemia, Depression, Diabetes, Fractures (left hip fx)


   Denies: Crohn's Disease, HTN (PT DENIES), Chronic Kidney Disease





- [facility] Procedures











CLOSED RED-INT FIX FEMUR (06/24/14)


FLUOROSCOPY OF SUPERIOR VENA CAVA, GUIDANCE (10/26/16)


INSERTION OF INFUSION DEV INTO SUP VENA CAVA, PERC APPROACH (08/27/18)








Family History: States: Unknown Family Hx





- Social History


Hx Tobacco Use: Yes


Hx Alcohol Use: No


Hx Substance Use: No





- Immunization History


Hx Tetanus Toxoid Vaccination: Yes


Hx Influenza Vaccination: No


Hx Pneumococcal Vaccination: No





Review Of Systems


Constitutional: Negative for: Fever, Chills, Weakness


Eyes: Negative for: Redness, Other (icterus)


ENT: Negative for: Mouth Swelling


Cardiovascular: Negative for: Chest Pain


Respiratory: Negative for: Cough, Shortness of Breath


Gastrointestinal: Negative for: Nausea, Vomiting, Diarrhea


Genitourinary: Negative for: Dysuria, Hematuria


Musculoskeletal: Positive for: Leg Pain (to left knee).  Negative for: Back Pain


Skin: Negative for: Rash


Neurological: Negative for: Weakness, Numbness, Dizziness





Physical Exam





- Physical Exam


Appears: Non-toxic, No Acute Distress


Skin: Warm, No Rash


Head: Atraumatic, Normacephalic


Eye(s): bilateral: Normal Inspection, PERRL, EOMI


Oral Mucosa: Moist


Neck: Normal ROM, Supple


Chest: Symmetrical


Respiratory: No Accessory Muscle Use, Other (Normal inspiratory effort)


Extremity: Deformity (Left BKA), Swelling (Large left knee effusion with 

ecchymoses over the patella), Other (No open wound)


Pulses: Left Radial: Normal, Right Radial: Normal


Neurological/Psych: Oriented x3, Normal Cranial Nerves





ED Course And Treatment


O2 Sat by Pulse Oximetry: 97 (RA)


Pulse Ox Interpretation: Normal





- Other Rad


  ** XR L knee


X-Ray: Interpreted by Me, Viewed By Me


Interpretation: (+) fracture of left patella





Medical Decision Making


Medical Decision Making: 





Impression: Left BKA/knee pain





Plan:


- Tylenol #3 given for pain


- X-ray taken of left knee





X-Ray shows fractured patella. 


Discussed results with patient in detail. Advised to follow up with orthopedist.







Knee immobilizer not tolerated secondary to BKA. Compression dressing placed to 

knee. 


Patient provided with crutches and crutch education via PT.


Patient is medically stable for discharge. Transport arranged to take patient 

home. 





Disposition


Counseled Patient/Family Regarding: Studies Performed, Diagnosis, Need For 

Followup, Rx Given





- Disposition


Referrals: 


Devon Gutierrez MD [Staff Provider] - 


Disposition: HOME/ ROUTINE


Disposition Time: 13:17


Condition: STABLE


Prescriptions: 


Acetaminophen/Codeine [Tylenol/Codeine 300 MG/30 MG] 1 tab PO Q6H #16 tab


Instructions:  Patella Fracture (DC)


Forms:  [facility] (English), General Discharge Instructions





- Clinical Impression


Clinical Impression: 


 Patella fracture








- PA / NP / Resident Statement


MD/DO has reviewed & agrees with the documentation as recorded.





- Scribe Statement


The provider has reviewed the documentation as recorded by the Edyibkyara Skaggs





All medical record entries made by the Edyibkyara were at my direction and 

personally dictated by me. I have reviewed the chart and agree that the record 

accurately reflects my personal performance of the history, physical exam, 

medical decision making, and the department course for this patient. I have also

personally directed, reviewed, and agree with the discharge instructions and 

disposition.

## 2019-01-08 NOTE — RAD
Date of service: 



01/08/2019



PROCEDURE:  Left Knee Radiographs.



HISTORY:

Pain.



COMPARISON:

7/24/2017



FINDINGS:



BONES:

Nondisplaced comminuted patellar fracture 



Patient is status post near complete amputation tibia fibula 



JOINTS:

Left knee arthrosis 



JOINT EFFUSION:

Interval joint effusion 



OTHER FINDINGS:

None.



IMPRESSION:

Interval nondisplaced acute patellar comminuted fracture 



Interval suprapatellar joint effusion. 



Other findings as above. 



Comments: Study marked for PA review .

## 2019-04-04 NOTE — CP.PCM.CON
History of Present Illness





- History of Present Illness


History of Present Illness: 





Admitted for eval and treatment of OM right foot - under care of Dr Maza he 

recently developed increasing pain and swelling of right foot 


with associated fever and chills   


Also c/o back pain , loss of appetite


Long hx of DM HTN  CKD and multiple foot infections  s/p Left BKA


empiric IV rx ordered 


prognosis for limb salvage guarded  May need fifth ray amputation





Review of Systems





- Review of Systems


All systems: reviewed and no additional remarkable complaints except





- Constitutional


Constitutional: As Per HPI, Chills, Fever, Malaise





- EENT


Eyes: absent: As Per HPI, Blind Spots, Blurred Vision, Change in Vision, 

Decreased Night Vision, Diplopia, Discharge, Dry Eye, Exophthalmos, Floaters, 

Irritation, Itchy Eyes, Loss of Peripheral Vision, Pain, Photophobia, Requires 

Corrective Lenses, Sees Flashes, Spots in Vision, Tunnel Vision, Other Visual 

Disturbances, Loss of Vision, Other


Ears: absent: As Per HPI, Decreased Hearing, Ear Discharge, Ear Pain, Tinnitus, 

Abnormal Hearing, Disequilibrium, Dizziness, Other


Nose/Mouth/Throat: absent: As Per HPI, Epistaxis, Nasal Congestion, Nasal 

Discharge, Nasal Obstruction, Nasal Trauma, Nose Pain, Post Nasal Drip, Sinus 

Pain, Sinus Pressure, Bleeding Gums, Change in Voice, Dental Pain, Dry Mouth, 

Dysphagia, Halitosis, Hoarsness, Lip Swelling, Mouth Lesions, Mouth Pain, 

Odynophagia, Sore Throat, Throat Swelling, Tongue Swelling, Facial Pain, Neck 

Pain, Neck Mass, Other





- Cardiovascular


Cardiovascular: absent: As Per HPI, Acrocyanosis, Chest Pain, Chest Pain at 

Rest, Chest Pain with Activity, Claudication, Diaphoresis, Dyspnea, Dyspnea on 

Exertion, Edema, Irregular Heart Rhythm, Pain Radiating to Arm/Neck/Jaw, Leg 

Edema, Leg Ulcers, Lightheadedness, Orthopnea, Palpitations, Paroxysmal 

Nocturnal Dyspnea, Pedal Edema, Radiating Pain, Rapid Heart Rate, Slow Heart 

Rate, Syncope, Other





- Respiratory


Respiratory: absent: As Per HPI, Cough, Dyspnea, Hemoptysis, Dyspnea on 

Exertion, Wheezing, Snoring, Stridor, Pain on Inspiration, Chest Congestion, 

Excessive Mucous Production, Change in Mucous Color, Pain with Coughing, Other





- Gastrointestinal


Gastrointestinal: absent: As Per HPI, Abdominal Pain, Belching, Bloating, Change

in Bowel Habits, Change in Stool Character, Coffee Ground Emesis, Constipation, 

Cramping, Diarrhea, Dyspepsia, Dysphagia, Early Satiety, Excessive Flatus, Fecal

Incontinence, Heartburn, Hematemesis, Hematochezia, Loose Stools, Melena, 

Nausea, Odynophagia, Temesmus, Vomiting, Other





- Genitourinary


Genitourinary: absent: As Per HPI, Change in Urinary Stream, Difficulty 

Urinating, Dysuria, Flank Pain, Hematuria, Pyuria, Nocturia, Urinary 

Incontinence, Urinary Frequency, Urinary Hesitance, Urinary Urgency, Voiding 

Freq/Small Amts, Freq UTI, Hx Renal/Bladder Calculi, Hx /Renal Surgery, Jose

dder Distension, Other





- Musculoskeletal


Musculoskeletal: As Per HPI





- Integumentary


Integumentary: As Per HPI, New Lesions, Skin Pain, Skin Ulcer





- Neurological


Neurological: As Per HPI, Sensory Deficit





- Psychiatric


Psychiatric: absent: As Per HPI, Abnormal Sleep Pattern, Anhedonia, Anxiety, 

Auditory Hallucinations, Behavioral Changes, Change in Appetite, Change in 

Libido, Confusion, Depression, Difficulty Concentrating, Hallucinations, H

omicidal Ideation, Hopelessness, Irritability, Memory Loss, Mood Swings, Panic 

Attacks, Paranoia, Suicidal Ideation, Visual Hallucinations, Tactile 

Hallucinations, Other





- Endocrine


Endocrine: As Per HPI





- Hematologic/Lymphatic


Hematologic: absent: As Per HPI, Easy Bleeding, Easy Bruising, Lymphadenopathy, 

Other





Past Patient History





- Infectious Disease


Hx of Infectious Diseases: None





- Past Medical History & Family History


Past Medical History?: Yes





- Past Social History


Smoking Status: Former Smoker





- CARDIAC


Hx Hypertension: No (PT DENIES)





- PULMONARY


Hx Respiratory Disorders: No





- NEUROLOGICAL


Hx Neurological Disorder: No





- HEENT


Hx HEENT Problems: No





- RENAL


Hx Chronic Kidney Disease: No





- ENDOCRINE/METABOLIC


Hx Endocrine Disorders: Yes


Hx Diabetes Mellitus Type 2: Yes





- HEMATOLOGICAL/ONCOLOGICAL


Hx Anemia: Yes





- INTEGUMENTARY


Hx Dermatological Problems: No





- MUSCULOSKELETAL/RHEUMATOLOGICAL


Hx Fractures: Yes (left hip fx)





- GASTROINTESTINAL


Hx Crohn's Disease: No





- GENITOURINARY/GYNECOLOGICAL


Hx Genitourinary Disorders: No





- PSYCHIATRIC


Hx Depression: Yes


Hx Substance Use: No





- SURGICAL HISTORY


Hx Surgeries: Yes


Hx Amputation: Yes (Left BKA)


Hx Orthopedic Surgery: Yes (left hip)


Other/Comment: BILATERAL VEIN STRIPPING AND LIGATION





- ANESTHESIA


Hx Anesthesia: Yes


Hx Anesthesia Reactions: No


Hx Malignant Hyperthermia: No





Meds


Allergies/Adverse Reactions: 


                                    Allergies











Allergy/AdvReac Type Severity Reaction Status Date / Time


 


oxycodone HCl [From Percocet] Allergy  ITCHING Verified 04/04/19 14:07


 


Penicillins Allergy  SHORTNESS Verified 04/04/19 14:07





   OF BREATH  














- Medications


Medications: 


                               Current Medications





Acetaminophen/Codeine Phosphate (Tylenol/Codeine 300 Mg/30 Mg)  1 ea PO Q4 PRN


   PRN Reason: Pain, moderate (4-7)


   Last Admin: 04/04/19 18:57 Dose:  1 ea











Physical Exam





- Constitutional


Appears: Well, Toxic, Chronically Ill





- Head Exam


Head Exam: ATRAUMATIC, NORMAL INSPECTION, NORMOCEPHALIC





- Eye Exam


Eye Exam: EOMI, Normal appearance, PERRL


Pupil Exam: NORMAL ACCOMODATION, PERRL





- ENT Exam


ENT Exam: Mucous Membranes Moist, Normal Exam





- Neck Exam


Neck exam: Positive for: Normal Inspection





- Respiratory Exam


Respiratory Exam: Clear to Auscultation Bilateral, NORMAL BREATHING PATTERN





- Cardiovascular Exam


Cardiovascular Exam: REGULAR RHYTHM





- GI/Abdominal Exam


GI & Abdominal Exam: Normal Bowel Sounds, Soft.  absent: Tenderness





- Rectal Exam


Rectal Exam: Deferred





-  Exam


 Exam: NORMAL INSPECTION (x)


Bimanual exam: NORMAL BIMANUAL EXAM





- Extremities Exam


Extremities exam: Positive for: normal inspection


Additional comments: 





Left leg BKA site healed


Right leg is warm up to knee


'wound right leg healed-scar is well healed


right foot swollen and warm with 2+ pulses 


3rd digit with healed dry scab on dorsal surface


'right lateral foot with ulcer / drainage over 5th metatarsal area 


No mariya pus no gas





- Back Exam


Back exam: NORMAL INSPECTION.  absent: CVA tenderness (L), CVA tenderness (R)





- Neurological Exam


Neurological exam: Alert, CN II-XII Intact, Oriented x3, Reflexes Normal





- Psychiatric Exam


Psychiatric exam: Depressed





- Skin


Skin Exam: Dry, Intact, Normal Color





Results





- Vital Signs


Recent Vital Signs: 


                                Last Vital Signs











Temp  100.5 F H  04/04/19 15:47


 


Pulse  99 H  04/04/19 18:52


 


Resp  12   04/04/19 18:52


 


BP  155/68 H  04/04/19 18:52


 


Pulse Ox  94 L  04/04/19 18:52














- Labs


Result Diagrams: 


                                 04/04/19 15:15





                                 04/04/19 15:15


Labs: 


                         Laboratory Results - last 24 hr











  04/04/19 04/04/19 04/04/19





  15:15 15:15 15:15


 


WBC  8.5  


 


RBC  2.57 L  


 


Hgb  7.6 L  


 


Hct  22.8 L  


 


MCV  88.9  


 


MCH  29.5  


 


MCHC  33.2  


 


RDW  14.0  


 


Plt Count  328  


 


MPV  7.2  


 


Neut % (Auto)  79.6 H  


 


Lymph % (Auto)  13.7 L  


 


Mono % (Auto)  3.9  


 


Eos % (Auto)  2.2  


 


Baso % (Auto)  0.6  


 


Neut # (Auto)  6.8  


 


Lymph # (Auto)  1.2  


 


Mono # (Auto)  0.3  


 


Eos # (Auto)  0.2  


 


Baso # (Auto)  0.1  


 


PT   13.0 H 


 


INR   1.2 


 


APTT   33 


 


pO2   


 


VBG pH   


 


VBG pCO2   


 


VBG HCO3   


 


VBG Total CO2   


 


VBG O2 Sat (Calc)   


 


VBG Base Excess   


 


VBG Potassium   


 


Glucose   


 


Lactate   


 


FiO2   


 


Sodium    137


 


Potassium    5.1


 


Chloride    104


 


Carbon Dioxide    26


 


Anion Gap    12


 


BUN    23 H


 


Creatinine    1.8 H


 


Est GFR ( Amer)    48


 


Est GFR (Non-Af Amer)    40


 


Random Glucose    195 H D


 


Calcium    9.0


 


Phosphorus    3.6


 


Magnesium    1.5 L


 


Total Bilirubin    0.5


 


AST    26


 


ALT    16 L D


 


Alkaline Phosphatase    112


 


Troponin I    < 0.0120


 


NT-Pro-B Natriuret Pep    1980 H


 


Total Protein    7.2


 


Albumin    3.9


 


Globulin    3.3


 


Albumin/Globulin Ratio    1.2


 


Venous Blood Potassium   














  04/04/19 04/04/19





  15:20 18:40


 


WBC  


 


RBC  


 


Hgb  


 


Hct  


 


MCV  


 


MCH  


 


MCHC  


 


RDW  


 


Plt Count  


 


MPV  


 


Neut % (Auto)  


 


Lymph % (Auto)  


 


Mono % (Auto)  


 


Eos % (Auto)  


 


Baso % (Auto)  


 


Neut # (Auto)  


 


Lymph # (Auto)  


 


Mono # (Auto)  


 


Eos # (Auto)  


 


Baso # (Auto)  


 


PT  


 


INR  


 


APTT  


 


pO2  29 L  34


 


VBG pH  7.38  7.38


 


VBG pCO2  40  36 L


 


VBG HCO3  22.7  21.4


 


VBG Total CO2  24.9  22.4


 


VBG O2 Sat (Calc)  59.2  72.9 H


 


VBG Base Excess  -1.3 L  -3.3 L


 


VBG Potassium  4.9  4.3


 


Glucose  189 H  160 H


 


Lactate  0.6 L  0.7


 


FiO2  21.0  21.0


 


Sodium  140.0  139.0


 


Potassium  


 


Chloride  107.0  109.0 H


 


Carbon Dioxide  


 


Anion Gap  


 


BUN  


 


Creatinine  


 


Est GFR ( Amer)  


 


Est GFR (Non-Af Amer)  


 


Random Glucose  


 


Calcium  


 


Phosphorus  


 


Magnesium  


 


Total Bilirubin  


 


AST  


 


ALT  


 


Alkaline Phosphatase  


 


Troponin I  


 


NT-Pro-B Natriuret Pep  


 


Total Protein  


 


Albumin  


 


Globulin  


 


Albumin/Globulin Ratio  


 


Venous Blood Potassium  4.9  4.3














Assessment & Plan





- Assessment and Plan (Free Text)


Assessment: 





vAdmitted for eval and treatment of OM right foot - under care of Dr Maza he 

recently developed increasing pain and swelling of right foot 


with associated fever and chills   


Also c/o back pain , loss of appetite


Long hx of DM HTN  CKD and multiple foot infections  s/p Left BKA


empiric IV rx ordered 


prognosis for limb salvage guarded  May need fifth ray amputation

## 2019-04-04 NOTE — RAD
Date of service: 



04/04/2019



PROCEDURE:  Right Foot Radiographs.



HISTORY:

 r/o OM 



COMPARISON:

8/27/2018



TECHNIQUE:

3 views obtained.



FINDINGS:



BONES:

Currently there is more super imposition of the metatarsal phalanges 

compared the prior study impeding optimal evaluation for the 2nd and 

3rd metatarsal anatomy here.  At minimum advanced arthrosis at both 

sites especially the 2nd site suggested previously flattening of the 

2nd metatarsal head was well depicted.  No gross cortical destruction 

is apparent-however the exam is particularly limited at this 2nd 

metatarsal phalangeal joint.  The spurring along the tibial aspect of 

the 2nd distal phalanx is as before.  Partial resection of the 5th 

metatarsal is as before.



There is interval suggested ulcer lateral to the 5th metatarsal base. 

 No gross periosteal reaction is seen here however there is on the 

oblique view subcortical lucencies of this lateral 5th metatarsal 

base cortex concerning for regional osteomyelitis this was not 

present before. 



JOINTS:

Normal. 



SOFT TISSUES:

Ulcer in soft tissue swelling borders the 5th metatarsal base. 



OTHER FINDINGS:

None.



IMPRESSION:

Lateral 5th metatarsal ulcer with cortical changes of the 5th 

metatarsal concerning for osteomyelitis here.



Comments: Study marked for PA review .

## 2019-04-04 NOTE — C.PDOC
History Of Present Illness


Patient is a 53 year old male, with a PMHx of anemia, chronic osteomyelitis, and

diabetes, who presents to the ED for pain to his right lower extremity. Patient 

also reports fever, chills, and rates the pain as a 10/10. Patient reports 

following up with  Podiatrdenisha, but now presents to the ED. He denies any 

headache, nausea, vomiting, CP, or SOB. 





Time Seen by Provider: 19 14:28


Chief Complaint (Nursing): Lower Extremity Problem/Injury


History Per: Patient


History/Exam Limitations: no limitations


Current Symptoms Are (Timing): Still Present


Pain Scale Rating Of: 10


Recent travel outside of the [place]: No


Additional History Per: Patient





Past Medical History


Reviewed: Historical Data, Nursing Documentation, Vital Signs


Vital Signs: 





                                Last Vital Signs











Temp  100.5 F H  19 15:47


 


Pulse  97 H  19 15:47


 


Resp  16   19 15:47


 


BP  163/80 H  19 15:47


 


Pulse Ox  93 L  19 15:47














- Medical History


PMH: Anemia, Depression, Diabetes, Fractures (left hip fx)


   Denies: Crohn's Disease, HTN (PT DENIES), Chronic Kidney Disease





- [facility] Procedures











CLOSED RED-INT FIX FEMUR (14)


FLUOROSCOPY OF SUPERIOR VENA CAVA, GUIDANCE (10/26/16)


INSERTION OF INFUSION DEV INTO SUP VENA CAVA, PERC APPROACH (18)








Family History: States: Unknown Family Hx





- Social History


Hx Tobacco Use: Yes


Hx Alcohol Use: No


Hx Substance Use: No





- Immunization History


Hx Tetanus Toxoid Vaccination: No


Hx Influenza Vaccination: No


Hx Pneumococcal Vaccination: No





Review Of Systems


Constitutional: Positive for: Fever, Chills


Cardiovascular: Negative for: Chest Pain


Respiratory: Negative for: Shortness of Breath


Gastrointestinal: Negative for: Nausea, Vomiting


Musculoskeletal: Positive for: Leg Pain (RLE)


Neurological: Negative for: Headache





Physical Exam





- Physical Exam


Appears: No Acute Distress, Other (pale)


Skin: Normal Color, Warm, Dry


Head: Atraumatic, Normacephalic


Eye(s): bilateral: Conjunctiva Pale


Oral Mucosa: Moist


Neck: Normal ROM, Supple


Chest: Symmetrical, No Deformity


Cardiovascular: Rhythm Regular, No Murmur


Respiratory: Normal Breath Sounds, No Rales, No Rhonchi, No Wheezing


Gastrointestinal/Abdominal: Soft, No Tenderness


Extremity: Other (Right lower extremity nonhealing wound to lateral plantar 

surface of foot and smaller wound to the 3rd toe at base of nail, skin around 

RLE indurated, warm, tender, chronic venostasis changes. Left lower extremity 

BKA)


Neurological/Psych: Oriented x3, Normal Speech, Normal Cognition





ED Course And Treatment





- Laboratory Results


Result Diagrams: 


                                 19 15:15





                                 19 15:15


Lab Results: 





                                        











pO2  29 mm/Hg (30-55)  L  19  15:20    


 


VBG pH  7.38  (7.32-7.43)   19  15:20    


 


VBG pCO2  40 mmHg (40-60)   19  15:20    


 


VBG HCO3  22.7 mmol/L  19  15:20    


 


VBG Total CO2  24.9 mmol/L (22-28)   19  15:20    


 


VBG O2 Sat (Calc)  59.2 % (40-65)   19  15:20    


 


VBG Base Excess  -1.3 mmol/L (0.0-2.0)  L  19  15:20    


 


VBG Potassium  4.9 mmol/L (3.6-5.2)   19  15:20    


 


Sodium  140.0 mmol/l (132-148)   19  15:20    


 


Chloride  107.0 mmol/L ()   19  15:20    


 


Glucose  189 mg/dl ()  H  19  15:20    


 


Lactate  0.6 mmol/L (0.7-2.1)  L  19  15:20    


 


FiO2  21.0 %  19  15:20    








                                        











PT  13.0 SECONDS (9.7-12.2)  H  19  15:15    


 


INR  1.2   19  15:15    


 


APTT  33 SECONDS (21-34)   19  15:15    








                                        











Troponin I  < 0.0120 ng/mL (0.00-0.120)   19  15:15    








                                        











NT-Pro-B Natriuret Pep  1980 pg/mL (0-900)  H  19  15:15    








                                        











Total Bilirubin  0.5 mg/dL (0.2-1.3)   19  15:15    


 


AST  26 U/L (17-59)   19  15:15    


 


ALT  16 U/L (21-72)  L D 19  15:15    


 


Alkaline Phosphatase  112 U/L ()   19  15:15    


 


Total Protein  7.2 g/dL (6.3-8.3)   19  15:15    


 


Albumin  3.9 g/dL (3.5-5.0)   19  15:15    


 


Globulin  3.3 gm/dL (2.2-3.9)   19  15:15    


 


Albumin/Globulin Ratio  1.2  (1.0-2.1)   19  15:15    











ECG: Viewed By Me, Discussed With Cardiologist


ECG Rhythm: Sinus Rhythm


Interpretation Of ECG: No ST elevations or depressions


Rate From EC


O2 Sat by Pulse Oximetry: 93 (on RA)


Pulse Ox Interpretation: Normal





- Other Rad


  ** Xray RT Foot


X-Ray: Viewed By Me, Read By Radiologist


Interpretation: IMPRESSION:  Lateral 5th metatarsal ulcer with cortical changes 

of the 5th metatarsal concerning for osteomyelitis here.  Comments: Study marked

for PA review .





  ** CXR


X-Ray: Viewed By Me, Read By Radiologist


Interpretation: IMPRESSION:  Mild left basilar atelectasis. Hypoinflation.





Medical Decision Making


Medical Decision Making: 


Plan:


VBG


EKG


ESR


CXR


Labs


Blood Culture


Urine Culture


Wound Culture


Urinalysis


Xray RT Foot


Azactram IVPB








Patient seen by Podiatry in ED who suggested admission and IV Abx. Patient has 

low grade temperature with no significant white count. 


Spoke with Dr.Elias VILLEGAS, who suggested [facility] for infectious disease. 





Disposition


Discussed With : Liliane Brito


Counseled Patient/Family Regarding: Studies Performed, Diagnosis, Need For 

Followup





- Disposition


Disposition: HOSPITALIZED


Disposition Time: 17:48


Condition: GUARDED





- Clinical Impression


Clinical Impression: 


 Osteomyelitis








- Scribe Statement


The provider has reviewed the documentation as recorded by the Suze Caraballo





All medical record entries made by the Edyibkyara were at my direction and 

personally dictated by me. I have reviewed the chart and agree that the record 

accurately reflects my personal performance of the history, physical exam, 

medical decision making, and the department course for this patient. I have also

personally directed, reviewed, and agree with the discharge instructions and 

disposition.








Decision To Admit





- Pt Status Changed To:


Hospital Disposition Of: Inpatient





- Admit Certification


Admit to Inpatient:: After my assessment, the patient will require 

hospitalization for at least two midnights.  This is because of the severity of 

symptoms shown, intensity of services needed, and/or the medical risk in this 

patient being treated as an outpatient.





- InPatient:


Physician Admission Certification: I certify that this patient requires 2 or 

more midnights of care for the following reason:: osteomyelitis with 

complication of anemia, fever, pain to right foot





- .


Bed Request Type: Regular


Admitting Physician: Liliane Brito


Patient Diagnosis: 


 Osteomyelitis

## 2019-04-04 NOTE — RAD
HISTORY:

 Sepsis Patient 



COMPARISON:

Chest x-ray performed 11/6/18 



TECHNIQUE:

Chest, one view.



FINDINGS:

Examination limited by habitus and hypoinflation.



LUNGS:

Mild left basilar atelectasis. No focal consolidation.



Please note that chest x-ray has limited sensitivity for the 

detection of pulmonary masses.



PLEURA:

No significant pleural effusion identified. No definite pneumothorax .



CARDIOVASCULAR:

Heart size appears top normal.  No significant atherosclerotic 

calcification present. 



OSSEOUS STRUCTURES:

No acute osseous abnormality identified.



VISUALIZED UPPER ABDOMEN:

Unremarkable.



OTHER FINDINGS:

None.



IMPRESSION:

Mild left basilar atelectasis. Hypoinflation.

## 2019-04-04 NOTE — CP.PCM.CON
History of Present Illness





- History of Present Illness


History of Present Illness: 





Podiatry Consult Note for Dr. Maza





53M PMH DM2 seen and evaluated in ED after being sent in by Dr. Maza for right 

foot ulceration. States that the ulceration on the lateral side of his foot has 

been present for several weeks and has progressively been getting worse in 

appearance. Patient also relates severe, sharp shooting pain from his foot all 

the way up to his groin, especially behind his calf. He states that he has been 

seeing Dr. Maza regularly for wound care. He is AAO at time of visit. He 

appears to be lethargic. He states that he has been vomiting today and is 

experiencing chills. He denies any further pedal complaints at this time. 





Meds: See MAR


All: Oxycodone HCl, Penicillins


PSH: L BKA, Partial fifth metatarsal resection R foot








Review of Systems





- Review of Systems


All systems: reviewed and no additional remarkable complaints except


Review of Systems: 





as per HPI





Past Patient History





- Infectious Disease


Hx of Infectious Diseases: None





- Past Medical History & Family History


Past Medical History?: Yes





- Past Social History


Smoking Status: Former Smoker





- CARDIAC


Hx Hypertension: No (PT DENIES)





- PULMONARY


Hx Respiratory Disorders: No





- NEUROLOGICAL


Hx Neurological Disorder: No





- HEENT


Hx HEENT Problems: No





- RENAL


Hx Chronic Kidney Disease: No





- ENDOCRINE/METABOLIC


Hx Endocrine Disorders: Yes


Hx Diabetes Mellitus Type 2: Yes





- HEMATOLOGICAL/ONCOLOGICAL


Hx Blood Disorders: Yes


Hx Anemia: Yes





- INTEGUMENTARY


Hx Dermatological Problems: No





- MUSCULOSKELETAL/RHEUMATOLOGICAL


Hx Musculoskeletal Disorders: Yes


Hx Fractures: Yes (left hip fx)


Other/Comment: L BKA





- GASTROINTESTINAL


Hx Crohn's Disease: No





- GENITOURINARY/GYNECOLOGICAL


Hx Genitourinary Disorders: No





- PSYCHIATRIC


Hx Psychophysiologic Disorder: Yes


Hx Depression: Yes


Hx Substance Use: No





- SURGICAL HISTORY


Hx Surgeries: Yes


Hx Amputation: Yes (Left BKA)


Hx Orthopedic Surgery: Yes (left hip)


Other/Comment: BILATERAL VEIN STRIPPING AND LIGATION





- ANESTHESIA


Hx Anesthesia: Yes


Hx Anesthesia Reactions: No


Hx Malignant Hyperthermia: No





Meds


Allergies/Adverse Reactions: 


                                    Allergies











Allergy/AdvReac Type Severity Reaction Status Date / Time


 


oxycodone HCl [From Percocet] Allergy  ITCHING Verified 04/04/19 14:07


 


Penicillins Allergy  SHORTNESS Verified 04/04/19 14:07





   OF BREATH  














- Medications


Medications: 


                               Current Medications





Aztreonam 2 gm/ Sodium (Chloride)  100 mls @ 200 mls/hr IVPB ONCE ONE; Protocol


   Stop: 04/04/19 17:21











Physical Exam





- Constitutional


Appears: No Acute Distress, Cachectic





- Extremities Exam


Additional comments: 





RLE focused exam:





Vasc: DP/PT pulses fully palpable 2/4. Skin temperature increased to lower right

leg and foot. CFT < 3 seconds to all digits. Minimal edema noted to periwound 

area


Neuro: Epicritic and protective sensation grossly diminished b/l


Derm: 3 cm x 2 cm x 0.3 cm necrotic ulceration noted to lateral aspect of right 

foot at level of fifth metatarsal. No drainage, fluctuance, tracking, tunneling 

or undermining appreciated. Malodor is noted as well as periwound erythema 

without streaking. Hemmorhagic blister noted to dorsal aspect of third digit.


MSK: Minimal pain on palpation to ulceration site. Shortened fifth digit 

secondary to previous partial fifth metatarsal amputation





- Neurological Exam


Neurological exam: Alert, Oriented x3





- Psychiatric Exam


Psychiatric exam: Normal Affect, Normal Mood





Results





- Vital Signs


Recent Vital Signs: 


                                Last Vital Signs











Temp  100.5 F H  04/04/19 15:47


 


Pulse  97 H  04/04/19 15:47


 


Resp  16   04/04/19 15:47


 


BP  163/80 H  04/04/19 15:47


 


Pulse Ox  93 L  04/04/19 15:47














- Labs


Result Diagrams: 


                                 04/04/19 15:15





                                 04/04/19 15:15


Labs: 


                         Laboratory Results - last 24 hr











  04/04/19 04/04/19 04/04/19





  15:15 15:15 15:15


 


WBC  8.5  


 


RBC  2.57 L  


 


Hgb  7.6 L  


 


Hct  22.8 L  


 


MCV  88.9  


 


MCH  29.5  


 


MCHC  33.2  


 


RDW  14.0  


 


Plt Count  328  


 


MPV  7.2  


 


Neut % (Auto)  79.6 H  


 


Lymph % (Auto)  13.7 L  


 


Mono % (Auto)  3.9  


 


Eos % (Auto)  2.2  


 


Baso % (Auto)  0.6  


 


Neut # (Auto)  6.8  


 


Lymph # (Auto)  1.2  


 


Mono # (Auto)  0.3  


 


Eos # (Auto)  0.2  


 


Baso # (Auto)  0.1  


 


PT   13.0 H 


 


INR   1.2 


 


APTT   33 


 


pO2   


 


VBG pH   


 


VBG pCO2   


 


VBG HCO3   


 


VBG Total CO2   


 


VBG O2 Sat (Calc)   


 


VBG Base Excess   


 


VBG Potassium   


 


Glucose   


 


Lactate   


 


FiO2   


 


Sodium    137


 


Potassium    5.1


 


Chloride    104


 


Carbon Dioxide    26


 


Anion Gap    12


 


BUN    23 H


 


Creatinine    1.8 H


 


Est GFR ( Amer)    48


 


Est GFR (Non-Af Amer)    40


 


Random Glucose    195 H D


 


Calcium    9.0


 


Phosphorus    3.6


 


Magnesium    1.5 L


 


Total Bilirubin    0.5


 


AST    26


 


ALT    16 L D


 


Alkaline Phosphatase    112


 


Troponin I    < 0.0120


 


NT-Pro-B Natriuret Pep    1980 H


 


Total Protein    7.2


 


Albumin    3.9


 


Globulin    3.3


 


Albumin/Globulin Ratio    1.2


 


Venous Blood Potassium   














  04/04/19





  15:20


 


WBC 


 


RBC 


 


Hgb 


 


Hct 


 


MCV 


 


MCH 


 


MCHC 


 


RDW 


 


Plt Count 


 


MPV 


 


Neut % (Auto) 


 


Lymph % (Auto) 


 


Mono % (Auto) 


 


Eos % (Auto) 


 


Baso % (Auto) 


 


Neut # (Auto) 


 


Lymph # (Auto) 


 


Mono # (Auto) 


 


Eos # (Auto) 


 


Baso # (Auto) 


 


PT 


 


INR 


 


APTT 


 


pO2  29 L


 


VBG pH  7.38


 


VBG pCO2  40


 


VBG HCO3  22.7


 


VBG Total CO2  24.9


 


VBG O2 Sat (Calc)  59.2


 


VBG Base Excess  -1.3 L


 


VBG Potassium  4.9


 


Glucose  189 H


 


Lactate  0.6 L


 


FiO2  21.0


 


Sodium  140.0


 


Potassium 


 


Chloride  107.0


 


Carbon Dioxide 


 


Anion Gap 


 


BUN 


 


Creatinine 


 


Est GFR ( Amer) 


 


Est GFR (Non-Af Amer) 


 


Random Glucose 


 


Calcium 


 


Phosphorus 


 


Magnesium 


 


Total Bilirubin 


 


AST 


 


ALT 


 


Alkaline Phosphatase 


 


Troponin I 


 


NT-Pro-B Natriuret Pep 


 


Total Protein 


 


Albumin 


 


Globulin 


 


Albumin/Globulin Ratio 


 


Venous Blood Potassium  4.9














Assessment & Plan





- Assessment and Plan (Free Text)


Assessment: 





53M seen for acutely infected right foot ulceration


Plan: 





Patient seen and evaluated


Plan discussed with Dr. Maza


Absent leukocytosis, Temp: 100.5F


Aztreonam x 1 dose given in ED


ID consult placed, recs appreciated


Wound cx taken, follow up results


Follow up ESR


Wound dressed with DSD


No plan for surgical intervention at this time


Podiatry will continue to follow while patient in house





- Date & Time


Date: 04/04/19


Time: 17:31

## 2019-04-05 NOTE — CP.PCM.PN
Subjective





- Date & Time of Evaluation


Date of Evaluation: 04/05/19


Time of Evaluation: 09:21





- Subjective


Subjective: 





Podiatry Progress Note for Dr. Maza





53M seen and evaluated at bedside with Dr. Maza for infected right foot wound. 

Patient is AAO x 3 and NAD, resting comfortably in bed. States that he is still 

experiencing sharp, shooting pains from his foot all the way to his groin. Also 

states that he has been noticing a foul smell coming from his foot. Denies any 

further pedal complaints at this time. States that he is no longer experiencing 

chills, nausea or vomiting and feels much improved over yesterday





Objective





- Vital Signs/Intake and Output


Vital Signs (last 24 hours): 


                                        











Temp Pulse Resp BP Pulse Ox


 


 99.1 F   87   20   164/80 H  99 


 


 04/05/19 01:14  04/05/19 01:14  04/05/19 01:14  04/05/19 01:14  04/05/19 01:14








Intake and Output: 


                                        











 04/05/19 04/05/19





 06:59 18:59


 


Intake Total 400 150


 


Output Total  500


 


Balance 400 -350














- Medications


Medications: 


                               Current Medications





Acetaminophen/Codeine Phosphate (Tylenol/Codeine 300 Mg/30 Mg)  1 ea PO Q4 PRN


   PRN Reason: Pain, moderate (4-7)


   Last Admin: 04/05/19 08:13 Dose:  1 ea


Enalapril Maleate (Vasotec)  2.5 mg PO DAILY [facility]


Gabapentin (Neurontin)  100 mg PO QID [facility]


Heparin Sodium (Porcine) (Heparin)  5,000 units SC Q12 [facility]


Cefepime HCl (Maxipime Iv 1 Gm Premix)  1 gm in 50 mls @ 100 mls/hr IVPB Q12H 

[facility]; Protocol


   Last Admin: 04/05/19 08:15 Dose:  100 mls/hr


Vancomycin/Sodium Chloride (Vancomycin 1 Gm/Ns 200 Ml)  1 gm in 200 mls @ 133 

mls/hr IVPB Q24H [facility]; Protocol


   Stop: 04/09/19 23:01


   Last Admin: 04/04/19 23:02 Dose:  133 mls/hr


Insulin Human Regular (Novolin R)  0 unit SC ACHS [facility]; Protocol


Pantoprazole Sodium (Protonix Ec Tab)  40 mg PO DAILY [facility]


Sertraline HCl (Zoloft)  150 mg PO DAILY [facility]


Sodium Hypochlorite (Dakins Solution 0.125%)  0 appl TOP DAILY [facility]











- Labs


Labs: 


                                        





                                 04/04/19 15:15 





                                 04/04/19 15:15 





                                        











PT  13.0 SECONDS (9.7-12.2)  H  04/04/19  15:15    


 


INR  1.2   04/04/19  15:15    


 


APTT  33 SECONDS (21-34)   04/04/19  15:15    














- Constitutional


Appears: Well, Non-toxic, No Acute Distress





- Extremities Exam


Additional comments: 





RLE focused exam:





Vasc: DP/PT pulses fully palpable 2/4. Skin temperature increased to lower right

leg and foot but improved since yesterday. CFT < 3 seconds to all digits. Minima

l edema noted to periwound area


Neuro: Epicritic and protective sensation grossly diminished b/l


Derm: 3 cm x 2 cm x 0.3 cm necrotic ulceration noted to lateral aspect of right 

foot at level of fifth metatarsal. No drainage, fluctuance, tracking, tunneling 

or undermining appreciated. Malodor is still noted as well as periwound erythema

without streaking. Hemmorhagic blister noted to dorsal aspect of third digit, 

unchanged.


MSK: Minimal pain on palpation to ulceration site. Shortened fifth digit 

secondary to previous partial fifth metatarsal amputation





- Neurological Exam


Neurological Exam: Alert, Awake, Oriented x3





- Psychiatric Exam


Psychiatric exam: Normal Affect, Normal Mood





Assessment and Plan





- Assessment and Plan (Free Text)


Assessment: 





53M seen and evaluated at bedside with Dr. Maza for infected right foot wound


Plan: 





Patient seen and evaluated with Dr. Maza


Afebrile


Continue abx per ID


Follow up wound cx





R foot xray: Lateral 5th metatarsal ulcer with cortical changes of the 5th 

metatarsal concerning for osteomyelitis


Ordered Dakin's solution for tomorrow's dressing change


Wound dressed with DSD


Had long, extensive discussion with patient about treatment options and need for

debridement of wound as well as possible excision of infected fifth metatarsal


Discussed all possible risks, benefits and potential complications with patient


Patient demonstrated good understanding


Patient now seeks surgical intervention with debridement of right foot wound 

with possible excision of infected bone


Surgery is tentatively scheduled for Monday 4/8 with Dr. Maza


Documentation of medical clearance for surgery by Medical team needed at this 

time


Podiatry will continue to follow while patient in house

## 2019-04-05 NOTE — RAD
Date of service: 



04/05/2019



PROCEDURE:  CHEST RADIOGRAPH, 1 VIEW



HISTORY:

verify left PICC



COMPARISON:

April 4, 2019.  Single-view chest



FINDINGS:



LUNGS:

Right lower lobe infiltrate/atelectasis.



PLEURA:

No pneumothorax or pleural fluid seen.



CARDIOVASCULAR:

 No aortic atherosclerotic calcification present. PICC line in 

satisfactory position the tip is in the SVC within 8 cm of the 

cavoatrial junction.



OSSEOUS STRUCTURES:

No significant abnormalities.



VISUALIZED UPPER ABDOMEN:

Normal.



OTHER FINDINGS:

None. 



IMPRESSION:

Satisfactory position of recently placed PICC line.



New right lower lobe infiltrate.

## 2019-04-05 NOTE — CP.PCM.PN
Subjective





- Date & Time of Evaluation


Date of Evaluation: 04/05/19


Time of Evaluation: 09:00





- Subjective


Subjective: 





less body aches and fever


awake and alert


NAD


had 2 units PRBC's today


family at bedside


denies chest pain cough or SOB 





Objective





- Vital Signs/Intake and Output


Vital Signs (last 24 hours): 


                                        











Temp Pulse Resp BP Pulse Ox


 


 99.1 F   87   20   144/75   99 


 


 04/05/19 01:14  04/05/19 01:14  04/05/19 01:14  04/05/19 09:55  04/05/19 01:14








Intake and Output: 


                                        











 04/05/19 04/05/19





 06:59 18:59


 


Intake Total 400 150


 


Output Total  500


 


Balance 400 -350














- Medications


Medications: 


                               Current Medications





Acetaminophen/Codeine Phosphate (Tylenol/Codeine 300 Mg/30 Mg)  1 ea PO Q4 PRN


   PRN Reason: Pain, moderate (4-7)


   Last Admin: 04/05/19 08:13 Dose:  1 ea


Enalapril Maleate (Vasotec)  2.5 mg PO DAILY [facility]


   Last Admin: 04/05/19 09:55 Dose:  2.5 mg


Gabapentin (Neurontin)  100 mg PO QID [facility]


   Last Admin: 04/05/19 13:44 Dose:  100 mg


Heparin Sodium (Porcine) (Heparin)  5,000 units SC Q12 [facility]


   Last Admin: 04/05/19 09:50 Dose:  5,000 units


Cefepime HCl (Maxipime Iv 1 Gm Premix)  1 gm in 50 mls @ 100 mls/hr IVPB Q12H 

[facility]; Protocol


   Last Admin: 04/05/19 08:15 Dose:  100 mls/hr


Vancomycin/Sodium Chloride (Vancomycin 1 Gm/Ns 200 Ml)  1 gm in 200 mls @ 133 

mls/hr IVPB Q24H [facility]; Protocol


   Stop: 04/09/19 23:01


   Last Admin: 04/04/19 23:02 Dose:  133 mls/hr


Insulin Human Regular (Novolin R)  0 unit SC ACHS [facility]; Protocol


   Last Admin: 04/05/19 12:06 Dose:  6 units


Pantoprazole Sodium (Protonix Ec Tab)  40 mg PO DAILY [facility]


   Last Admin: 04/05/19 09:50 Dose:  40 mg


Pneumococcal Polyvalent Vaccine (Pneumovax 23 Vaccine)  0.5 ml IM .ONCE ONE


   Stop: 04/07/19 10:01


Sertraline HCl (Zoloft)  150 mg PO DAILY [facility]


   Last Admin: 04/05/19 09:53 Dose:  150 mg


Sodium Hypochlorite (Dakins Solution 0.125%)  0 appl TOP DAILY [facility]


   Last Admin: 04/05/19 10:05 Dose:  Not Given











- Labs


Labs: 


                                        





                                 04/05/19 11:41 





                                 04/04/19 15:15 





                                        











PT  13.0 SECONDS (9.7-12.2)  H  04/04/19  15:15    


 


INR  1.2   04/04/19  15:15    


 


APTT  33 SECONDS (21-34)   04/04/19  15:15    














- Constitutional


Appears: Non-toxic, Chronically Ill





- Head Exam


Head Exam: NORMOCEPHALIC





- Eye Exam


Eye Exam: absent: Scleral icterus





- ENT Exam


ENT Exam: Mucous Membranes Dry





- Neck Exam


Neck Exam: absent: Lymphadenopathy





- Respiratory Exam


Respiratory Exam: Decreased Breath Sounds, Clear to Ausculation Bilateral





- Cardiovascular Exam


Cardiovascular Exam: REGULAR RHYTHM, +S1, +S2





- GI/Abdominal Exam


GI & Abdominal Exam: Distended, Soft.  absent: Tenderness





- Rectal Exam


Rectal Exam: Deferred





-  Exam


 Exam: NORMAL INSPECTION





- Extremities Exam


Extremities Exam: Pedal Edema.  absent: Calf Tenderness, Normal Capillary 

Refill, Normal Inspection


Additional comments: 





Left BKA well healed





right foot with ulcer on lateral aspect which is surrounded by indurated area 


some foul smell noted but no mariya Pus  no crepitation 


healed right leg wound noted on right lower leg-  + leg warmth extending toward 

knee 


Pulses are palpable + 2 











- Back Exam


Back Exam: absent: CVA tenderness (L), CVA tenderness (R)





- Neurological Exam


Neurological Exam: Alert, Awake, CN II-XII Intact, Oriented x3.  absent: Normal 

Gait


Neuro motor strength exam: Left Upper Extremity: 4, Right Upper Extremity: 4, 

Right Lower Extremity: 4





- Psychiatric Exam


Psychiatric exam: Depressed





- Skin


Skin Exam: Dry





Assessment and Plan


(1) Osteomyelitis


Status: Acute   





(2) Cellulitis of right leg


Status: Acute   





(3) Chronic renal insufficiency


Status: Acute   





(4) Diabetic ulcer of foot associated with diabetes mellitus due to underlying 

condition, limited to breakdown of skin


Status: Acute   





(5) Leg pain


Status: Acute   





(6) Musculoskeletal pain


Status: Acute   





- Assessment and Plan (Free Text)


Assessment: 





empiric rx in progress


for OR in am Monday 


await OR cultures


will need long course of rx

## 2019-04-06 NOTE — PN
DATE:  04/06/2019



DAILY PROGRESS NOTE



SUBJECTIVE:  The patient is seen today, 04/06/2019.  He is status post

transfusion of packed RBCs and hemoglobin is up to 8.6.



PHYSICAL EXAMINATION:

VITAL SIGNS:  Blood pressure 165/83, temperature 99.2, respiratory rate 20,

and pulse 80.

HEENT:  Pupils equal, reactive to light.  Pale mucosa of the conjunctivae.

NECK:  Supple.  No JVD.  No carotid bruit.  No lymph node.  No thyromegaly.

CHEST AND LUNGS:  Bilateral symmetrical expansion.  Good air exchange.  No

rales.  No rhonchi.

CARDIOVASCULAR SYSTEM:  PMI not localized.  S1, S2.  No additional sounds.

ABDOMEN:  Normoactive bowel sounds.  No tenderness.  No organomegaly.  No

masses.

EXTREMITIES:  Left BKA and right foot infected wound which is dressed

surgically by Podiatry.



ASSESSMENT:  Infected wound of the right foot that did not respond to

outpatient treatment and wound care by Podiatry.  Type 2 diabetes mellitus

with hyperglycemia.  Anemia of chronic disease.  Chronic kidney disease

stage 3.



PLAN:  The patient was seen already by Cardiology, and he is for stress

test and echocardiogram before clearing for debridement procedure.





__________________________________________

Liliane Brito MD





DD:  04/06/2019 17:55:41

DT:  04/06/2019 19:00:11

Job # [id number]

## 2019-04-06 NOTE — CP.PCM.CON
History of Present Illness





- History of Present Illness


History of Present Illness: 


Reason for Consultation: Pre Op cardiac risk assessment





Patient is a 53 year old male, with a PMHx of anemia, chronic osteomyelitis, and

diabetes, who presents to the ED for pain to his right lower extremity. Patient 

also reports fever, chills, and rates the pain as a 10/10. Patient reports 

following up with  Podiatry, but now presents to the ED. He denies any 

headache, nausea, vomiting, CP, or SOB. 





Chief Complaint (Nursing): Lower Extremity Problem/Injury


History Per: Patient


History/Exam Limitations: no limitations


Current Symptoms Are (Timing): Still Present


Pain Scale Rating Of: 10


Recent travel outside of the [place]: No


Additional History Per: Patient





Past Medical History








- Medical History


PMH: Anemia, Depression, Diabetes, Fractures (left hip fx)


   Denies: Crohn's Disease, HTN (PT DENIES), Chronic Kidney Disease





- [facility] Procedures











CLOSED RED-INT FIX FEMUR (06/24/14)


FLUOROSCOPY OF SUPERIOR VENA CAVA, GUIDANCE (10/26/16)


INSERTION OF INFUSION DEV INTO SUP VENA CAVA, PERC APPROACH (08/27/18)








Family History: States: Unknown Family Hx





- Social History


Hx Tobacco Use: Yes


Hx Alcohol Use: No


Hx Substance Use: No





- Immunization History


Hx Tetanus Toxoid Vaccination: No


Hx Influenza Vaccination: No


Hx Pneumococcal Vaccination: No





Review Of Systems


Constitutional: Positive for: Fever, Chills


Cardiovascular: Negative for: Chest Pain


Respiratory: Negative for: Shortness of Breath


Gastrointestinal: Negative for: Nausea, Vomiting


Musculoskeletal: Positive for: Leg Pain (RLE)


Neurological: Negative for: Headache





Physical Exam





- Physical Exam


Appears: No Acute Distress, Other (pale)


Skin: Normal Color, Warm, Dry


Head: Atraumatic, Normacephalic


Eye(s): bilateral: Conjunctiva Pale


Oral Mucosa: Moist


Neck: Normal ROM, Supple


Chest: Symmetrical, No Deformity


Cardiovascular: Rhythm Regular, No Murmur


Respiratory: Normal Breath Sounds, No Rales, No Rhonchi, No Wheezing


Gastrointestinal/Abdominal: Soft, No Tenderness


Extremity: Other (Right lower extremity nonhealing wound to lateral plantar 

surface of foot and smaller wound to the 3rd toe at base of nail, skin around 

RLE indurated, warm, tender, chronic venostasis changes. Left lower extremity 

BKA)


Neurological/Psych: Oriented x3, Normal Speech, Normal Cognition





Past Patient History





- Infectious Disease


Hx of Infectious Diseases: None





- Past Medical History & Family History


Past Medical History?: Yes





- Past Social History


Smoking Status: Former Smoker





- CARDIAC


Hx Hypertension: No (PT DENIES)





- PULMONARY


Hx Respiratory Disorders: No





- NEUROLOGICAL


Hx Neurological Disorder: No





- HEENT


Hx HEENT Problems: No





- RENAL


Hx Chronic Kidney Disease: No





- ENDOCRINE/METABOLIC


Hx Diabetes Mellitus Type 2: Yes





- HEMATOLOGICAL/ONCOLOGICAL


Hx Anemia: Yes





- INTEGUMENTARY


Hx Dermatological Problems: No





- MUSCULOSKELETAL/RHEUMATOLOGICAL


Hx Arthritis: Yes (l hip orif; l patellar fx)





- GASTROINTESTINAL


Hx Crohn's Disease: No





- GENITOURINARY/GYNECOLOGICAL


Hx Genitourinary Disorders: No





- PSYCHIATRIC


Hx Depression: Yes


Hx Substance Use: No





- SURGICAL HISTORY


Hx Surgeries: Yes


Hx Amputation: Yes (Left BKA)


Hx Orthopedic Surgery: Yes (left hip)


Other/Comment: BILATERAL VEIN STRIPPING AND LIGATION





- ANESTHESIA


Hx Anesthesia: Yes


Hx Anesthesia Reactions: No


Hx Malignant Hyperthermia: No





Meds


Allergies/Adverse Reactions: 


                                    Allergies











Allergy/AdvReac Type Severity Reaction Status Date / Time


 


oxycodone HCl [From Percocet] Allergy  ITCHING Verified 04/04/19 14:07


 


Penicillins Allergy  SHORTNESS Verified 04/04/19 14:07





   OF BREATH  














- Medications


Medications: 


                               Current Medications





Acetaminophen/Codeine Phosphate (Tylenol/Codeine 300 Mg/30 Mg)  1 ea PO Q4 PRN


   PRN Reason: Pain, moderate (4-7)


   Last Admin: 04/05/19 08:13 Dose:  1 ea


Enalapril Maleate (Vasotec)  2.5 mg PO DAILY [facility]


   Last Admin: 04/05/19 09:55 Dose:  2.5 mg


Gabapentin (Neurontin)  100 mg PO QID [facility]


   Last Admin: 04/05/19 22:24 Dose:  100 mg


Heparin Sodium (Porcine) (Heparin)  5,000 units SC Q12 [facility]


   Last Admin: 04/05/19 22:24 Dose:  5,000 units


Cefepime HCl (Maxipime Iv 1 Gm Premix)  1 gm in 50 mls @ 100 mls/hr IVPB Q12H 

[facility]; Protocol


   Last Admin: 04/06/19 08:33 Dose:  100 mls/hr


Vancomycin/Sodium Chloride (Vancomycin 1 Gm/Ns 200 Ml)  1 gm in 200 mls @ 133 

mls/hr IVPB Q24H [facility]; Protocol


   Stop: 04/09/19 23:01


   Last Admin: 04/06/19 05:02 Dose:  133 mls/hr


Insulin Human Regular (Novolin R)  0 unit SC ACHS [facility]; Protocol


   Last Admin: 04/06/19 08:34 Dose:  4 units


Pantoprazole Sodium (Protonix Ec Tab)  40 mg PO DAILY [facility]


   Last Admin: 04/05/19 09:50 Dose:  40 mg


Pneumococcal Polyvalent Vaccine (Pneumovax 23 Vaccine)  0.5 ml IM .ONCE ONE


   Stop: 04/07/19 10:01


Sertraline HCl (Zoloft)  150 mg PO DAILY [facility]


   Last Admin: 04/05/19 09:53 Dose:  150 mg


Sodium Hypochlorite (Dakins Solution 0.125%)  0 appl TOP DAILY [facility]


   Last Admin: 04/05/19 10:05 Dose:  Not Given











Results





- Vital Signs


Recent Vital Signs: 


                                Last Vital Signs











Temp  97.9 F   04/06/19 03:30


 


Pulse  78   04/06/19 03:30


 


Resp  18   04/06/19 03:30


 


BP  128/79   04/06/19 03:30


 


Pulse Ox  99   04/05/19 01:14














- Labs


Result Diagrams: 


                                 04/06/19 08:53





                                 04/06/19 08:53


Labs: 


                         Laboratory Results - last 24 hr











  04/05/19 04/05/19 04/05/19





  11:15 11:41 16:09


 


WBC   8.4 


 


RBC   2.35 L 


 


Hgb   7.0 L 


 


Hct   21.1 L 


 


MCV   89.7 


 


MCH   29.7 


 


MCHC   33.1 


 


RDW   14.1 


 


Plt Count   304 


 


MPV   7.6 


 


Neut % (Auto)   73.0 


 


Lymph % (Auto)   18.9 L 


 


Mono % (Auto)   4.9 


 


Eos % (Auto)   2.2 


 


Baso % (Auto)   1.0 


 


Neut # (Auto)   6.1 


 


Lymph # (Auto)   1.6 


 


Mono # (Auto)   0.4 


 


Eos # (Auto)   0.2 


 


Baso # (Auto)   0.1 


 


Sodium   


 


Potassium   


 


Chloride   


 


Carbon Dioxide   


 


Anion Gap   


 


BUN   


 


Creatinine   


 


Est GFR ( Amer)   


 


Est GFR (Non-Af Amer)   


 


POC Glucose (mg/dL)  302 H   220 H


 


Random Glucose   


 


Calcium   


 


Phosphorus   


 


Magnesium   


 


Total Bilirubin   


 


AST   


 


ALT   


 


Alkaline Phosphatase   


 


Total Protein   


 


Albumin   


 


Globulin   


 


Albumin/Globulin Ratio   


 


Vancomycin Trough   


 


Blood Type   


 


Antibody Screen   














  04/05/19 04/05/19 04/06/19





  17:10 21:39 04:20


 


WBC   


 


RBC   


 


Hgb   


 


Hct   


 


MCV   


 


MCH   


 


MCHC   


 


RDW   


 


Plt Count   


 


MPV   


 


Neut % (Auto)   


 


Lymph % (Auto)   


 


Mono % (Auto)   


 


Eos % (Auto)   


 


Baso % (Auto)   


 


Neut # (Auto)   


 


Lymph # (Auto)   


 


Mono # (Auto)   


 


Eos # (Auto)   


 


Baso # (Auto)   


 


Sodium   


 


Potassium   


 


Chloride   


 


Carbon Dioxide   


 


Anion Gap   


 


BUN   


 


Creatinine   


 


Est GFR ( Amer)   


 


Est GFR (Non-Af Amer)   


 


POC Glucose (mg/dL)   237 H 


 


Random Glucose   


 


Calcium   


 


Phosphorus   


 


Magnesium   


 


Total Bilirubin   


 


AST   


 


ALT   


 


Alkaline Phosphatase   


 


Total Protein   


 


Albumin   


 


Globulin   


 


Albumin/Globulin Ratio   


 


Vancomycin Trough    8.5


 


Blood Type  A POSITIVE  


 


Antibody Screen  Negative  














  04/06/19 04/06/19 04/06/19





  07:19 08:53 08:53


 


WBC   6.0 


 


RBC   2.85 L 


 


Hgb   8.4 L 


 


Hct   25.4 L 


 


MCV   89.1 


 


MCH   29.4 


 


MCHC   33.1 


 


RDW   14.4 


 


Plt Count   267 


 


MPV   7.5 


 


Neut % (Auto)   65.1 


 


Lymph % (Auto)   22.5 


 


Mono % (Auto)   5.2 


 


Eos % (Auto)   6.4 H 


 


Baso % (Auto)   0.8 


 


Neut # (Auto)   3.9 


 


Lymph # (Auto)   1.3 


 


Mono # (Auto)   0.3 


 


Eos # (Auto)   0.4 


 


Baso # (Auto)   0.1 


 


Sodium    134


 


Potassium    4.8


 


Chloride    104


 


Carbon Dioxide    26


 


Anion Gap    10


 


BUN    26 H


 


Creatinine    1.9 H


 


Est GFR ( Amer)    45


 


Est GFR (Non-Af Amer)    37


 


POC Glucose (mg/dL)  266 H  


 


Random Glucose    259 H D


 


Calcium    8.4 L


 


Phosphorus    3.5


 


Magnesium    1.7


 


Total Bilirubin    0.6


 


AST    23


 


ALT    10 L D


 


Alkaline Phosphatase    94


 


Total Protein    6.2 L


 


Albumin    3.1 L D


 


Globulin    3.0


 


Albumin/Globulin Ratio    1.0


 


Vancomycin Trough   


 


Blood Type   


 


Antibody Screen   














Assessment & Plan





- Assessment and Plan (Free Text)


Assessment: 





(1) Osteomyelitis


Status: Acute   





(2) Cellulitis of right leg


Status: Acute   





(3) Chronic renal insufficiency


Status: Acute   





(4) Diabetic ulcer of foot associated with diabetes mellitus due to underlying 

condition, limited to breakdown of skin


Status: Acute   





(5) Leg pain


Status: Acute   





(6) Musculoskeletal pain


Status: Acute   








Due to multiple cardiac risk factors, DM and PAD receommend stress and ECHO 

prior to this non urgent surgery


Stress and ECHO will be done Monady


Please plan for OR after 2pm Monday or after

## 2019-04-06 NOTE — HP
HISTORY OF PRESENT ILLNESS:  This is a 53-year-old  male with

history of multiple medical problems including type 2 diabetes mellitus

with hyperglycemia, status post left BKA, was referred by Podiatry due to

persistent infection of the right lower extremity.  The patient was seen in

the emergency room and admitted for further management.  Other review of

systems is exertional shortness of breath and pallor.



ALLERGIES:  POSITIVE FOR OXYCODONE AND PENICILLIN.



SOCIAL HISTORY:  Smoker.  No EtOH or substance abuse.



FAMILY HISTORY:  Not contributory.



MEDICATIONS:  Reviewed as per MAR.



PHYSICAL EXAMINATION:

GENERAL:  The patient is in bed, in no cardiopulmonary distress.

VITAL SIGNS:  Blood pressure 144/75, temperature 99.1, respiratory rate 20,

and pulse 87.

HEENT:  Pale mucosa of the conjunctivae.

NECK:  Supple.  No JVD.  No carotid bruit.  No lymph node.  No thyromegaly.

CHEST AND LUNGS:  Bilateral symmetrical expansion.  Good air exchange.  No

rales.  No rhonchi.

CARDIOVASCULAR SYSTEM:  PMI not localized.  S1, S2.  No additional sounds.

ABDOMEN:  Normoactive bowel sounds.  No tenderness.  No organomegaly.  No

masses.

EXTREMITIES:  No cyanosis, no clubbing, no edema of the right lower

extremity.  There is left BKA.  There is infected right foot ulcer.



ASSESSMENT:  Symptomatic anemia and also chronic kidney disease stage 3.



PLAN:  The patient is being admitted to medical floor and started on IV

antibiotic.  ID consult, Podiatry consult for wound care and possible

debridement.  We will also transfuse 2 units of packed RBCs as hemoglobin

dropped to 7 today.





__________________________________________

Liliane Brito MD





DD:  04/05/2019 16:06:02

DT:  04/05/2019 16:08:42

Job # [id number]

## 2019-04-06 NOTE — CP.PCM.PN
Subjective





- Date & Time of Evaluation


Date of Evaluation: 04/06/19


Time of Evaluation: 11:04





- Subjective


Subjective: 





Podiatry Progress Note Dr. Maza





53M seen and evaluated at bedside with Dr. Maza for infected right foot wound. 

Patient is AAO x 3 and NAD, resting comfortably in bed. States that pain is 

still present to right lower extremity but improved. Denies any further pedal 

complaints at this time. Denies N/V/F/C/CP/SOB/D








Objective





- Vital Signs/Intake and Output


Vital Signs (last 24 hours): 


                                        











Temp Pulse Resp BP Pulse Ox


 


 97.9 F   78   18   152/77 H  99 


 


 04/06/19 03:30  04/06/19 03:30  04/06/19 03:30  04/06/19 10:38  04/05/19 01:14








Intake and Output: 


                                        











 04/06/19 04/06/19





 06:59 18:59


 


Intake Total 2225 


 


Output Total 450 


 


Balance 1775 














- Medications


Medications: 


                               Current Medications





Acetaminophen/Codeine Phosphate (Tylenol/Codeine 300 Mg/30 Mg)  1 ea PO Q4 PRN


   PRN Reason: Pain, moderate (4-7)


   Last Admin: 04/05/19 08:13 Dose:  1 ea


Enalapril Maleate (Vasotec)  2.5 mg PO DAILY [facility]


   Last Admin: 04/06/19 10:38 Dose:  2.5 mg


Gabapentin (Neurontin)  100 mg PO QID [facility]


   Last Admin: 04/06/19 10:36 Dose:  100 mg


Heparin Sodium (Porcine) (Heparin)  5,000 units SC Q12 [facility]


   Last Admin: 04/06/19 10:35 Dose:  5,000 units


Cefepime HCl (Maxipime Iv 1 Gm Premix)  1 gm in 50 mls @ 100 mls/hr IVPB Q12H 

[facility]; Protocol


   Last Admin: 04/06/19 08:33 Dose:  100 mls/hr


Vancomycin/Sodium Chloride (Vancomycin 1 Gm/Ns 200 Ml)  1 gm in 200 mls @ 133 

mls/hr IVPB Q24H [facility]; Protocol


   Stop: 04/09/19 23:01


   Last Admin: 04/06/19 05:02 Dose:  133 mls/hr


Insulin Human Regular (Novolin R)  0 unit SC ACHS [facility]; Protocol


   Last Admin: 04/06/19 08:34 Dose:  4 units


Pantoprazole Sodium (Protonix Ec Tab)  40 mg PO DAILY [facility]


   Last Admin: 04/06/19 10:44 Dose:  40 mg


Pneumococcal Polyvalent Vaccine (Pneumovax 23 Vaccine)  0.5 ml IM .ONCE ONE


   Stop: 04/07/19 10:01


Sertraline HCl (Zoloft)  150 mg PO DAILY [facility]


   Last Admin: 04/06/19 10:44 Dose:  150 mg


Sodium Hypochlorite (Dakins Solution 0.125%)  0 appl TOP DAILY [facility]


   Last Admin: 04/06/19 10:41 Dose:  20 appl











- Labs


Labs: 


                                        





                                 04/06/19 08:53 





                                 04/06/19 08:53 





                                        











PT  13.0 SECONDS (9.7-12.2)  H  04/04/19  15:15    


 


INR  1.2   04/04/19  15:15    


 


APTT  33 SECONDS (21-34)   04/04/19  15:15    














- Constitutional


Appears: Well, Non-toxic, No Acute Distress





- Extremities Exam


Additional comments: 





RLE focused exam:





Vasc: DP/PT pulses fully palpable 2/4. Skin temperature increased to periwound 

area same as yesterday. CFT < 3 seconds to all digits. Minimal edema noted to 

periwound area


Neuro: Epicritic and protective sensation grossly diminished b/l


Derm: 3 cm x 2 cm x 0.3 cm necrotic ulceration noted to lateral aspect of right 

foot at level of fifth metatarsal. No drainage, fluctuance, tracking, tunneling 

or undermining appreciated. Malodor is still noted as well as periwound erythema

without streaking. Hemmorhagic blister noted to dorsal aspect of third digit, 

unchanged.


MSK: Minimal pain on palpation to ulceration site. Shortened fifth digit 

secondary to previous partial fifth metatarsal amputation





- Neurological Exam


Neurological Exam: Alert, Awake, Oriented x3





- Psychiatric Exam


Psychiatric exam: Normal Affect, Normal Mood





Assessment and Plan





- Assessment and Plan (Free Text)


Assessment: 





53M seen and evaluated at bedside with Dr. Maza for infected right foot wound


Plan: 





Patient seen and evaluated with Dr. Maza


Afebrile, absent leukocytosis


Continue abx per ID


Wound cx Enterococcus Faecalis





R foot xray: Lateral 5th metatarsal ulcer with cortical changes of the 5th 

metatarsal concerning for osteomyelitis


Wound dressed with DSD, Dakins solution


Cardiology consult appreciated


Debridement of ulceration with possible removal of bone is tentatively 

rescheduled to Tuesday 4/9 with Dr. Maza pending results of ECHO, stress test 

and cardio clearance


Podiatry will continue to follow while patient in house

## 2019-04-07 NOTE — CP.PCM.PN
Subjective





- Date & Time of Evaluation


Date of Evaluation: 04/06/19


Time of Evaluation: 12:05





- Subjective


Subjective: 


Patient with no cardiac events


For Foot surgery on Tuesday





Review Of Systems


Constitutional: Positive for: Fever, Chills


Cardiovascular: Negative for: Chest Pain


Respiratory: Negative for: Shortness of Breath


Gastrointestinal: Negative for: Nausea, Vomiting


Musculoskeletal: Positive for: Leg Pain (RLE)


Neurological: Negative for: Headache





Physical Exam





- Physical Exam


Appears: No Acute Distress, Other (pale)


Skin: Normal Color, Warm, Dry


Head: Atraumatic, Normacephalic


Eye(s): bilateral: Conjunctiva Pale


Oral Mucosa: Moist


Neck: Normal ROM, Supple


Chest: Symmetrical, No Deformity


Cardiovascular: Rhythm Regular, No Murmur


Respiratory: Normal Breath Sounds, No Rales, No Rhonchi, No Wheezing


Gastrointestinal/Abdominal: Soft, No Tenderness


Extremity: Other (Right lower extremity nonhealing wound to lateral plantar 

surface of foot and smaller wound to the 3rd toe at base of nail, skin around 

RLE indurated, warm, tender, chronic venostasis changes. Left lower extremity 

BKA)


Neurological/Psych: Oriented x3, Normal Speech, Normal Cognition








Objective





- Vital Signs/Intake and Output


Vital Signs (last 24 hours): 


                                        











Temp Pulse Resp BP Pulse Ox


 


 98.4 F   81   20   162/82 H  95 


 


 04/07/19 00:00  04/07/19 00:00  04/07/19 00:00  04/07/19 00:00  04/07/19 00:00








Intake and Output: 


                                        











 04/07/19 04/07/19





 06:59 18:59


 


Intake Total 740 


 


Output Total 450 


 


Balance 290 














- Medications


Medications: 


                               Current Medications





Acetaminophen/Codeine Phosphate (Tylenol/Codeine 300 Mg/30 Mg)  1 ea PO Q4 PRN


   PRN Reason: Pain, moderate (4-7)


   Last Admin: 04/05/19 08:13 Dose:  1 ea


Enalapril Maleate (Vasotec)  2.5 mg PO DAILY [facility]


   Last Admin: 04/06/19 10:38 Dose:  2.5 mg


Gabapentin (Neurontin)  100 mg PO QID [facility]


   Last Admin: 04/06/19 21:43 Dose:  100 mg


Heparin Sodium (Porcine) (Heparin)  5,000 units SC Q12 [facility]


   Last Admin: 04/06/19 21:43 Dose:  5,000 units


Cefepime HCl (Maxipime Iv 1 Gm Premix)  1 gm in 50 mls @ 100 mls/hr IVPB Q12H 

[facility]; Protocol


   Last Admin: 04/07/19 08:37 Dose:  100 mls/hr


Vancomycin/Sodium Chloride (Vancomycin 1 Gm/Ns 200 Ml)  1 gm in 200 mls @ 133 

mls/hr IVPB Q24H [facility]; Protocol


   Stop: 04/09/19 23:01


   Last Admin: 04/06/19 22:30 Dose:  133 mls/hr


Insulin Human Regular (Novolin R)  0 unit SC ACHS [facility]; Protocol


   Last Admin: 04/07/19 08:36 Dose:  4 units


Pantoprazole Sodium (Protonix Ec Tab)  40 mg PO DAILY [facility]


   Last Admin: 04/06/19 10:44 Dose:  40 mg


Pneumococcal Polyvalent Vaccine (Pneumovax 23 Vaccine)  0.5 ml IM .ONCE ONE


   Stop: 04/07/19 10:01


Sertraline HCl (Zoloft)  150 mg PO DAILY [facility]


   Last Admin: 04/06/19 10:44 Dose:  150 mg


Sodium Hypochlorite (Dakins Solution 0.125%)  0 appl TOP DAILY [facility]


   Last Admin: 04/06/19 10:41 Dose:  20 appl











- Labs


Labs: 


                                        





                                 04/06/19 08:53 





                                 04/06/19 08:53 





                                        











PT  13.0 SECONDS (9.7-12.2)  H  04/04/19  15:15    


 


INR  1.2   04/04/19  15:15    


 


APTT  33 SECONDS (21-34)   04/04/19  15:15    














Assessment and Plan





- Assessment and Plan (Free Text)


Assessment: 





Assessment & Plan





- Assessment and Plan (Free Text)


Assessment: 





(1) Osteomyelitis


Status: Acute   





(2) Cellulitis of right leg


Status: Acute   





(3) Chronic renal insufficiency


Status: Acute   





(4) Diabetic ulcer of foot associated with diabetes mellitus due to underlying 

condition, limited to breakdown of skin


Status: Acute   





(5) Leg pain


Status: Acute   





(6) Musculoskeletal pain


Status: Acute   








Due to multiple cardiac risk factors, DM and PAD receommend stress and ECHO 

prior to this non urgent surgery


Stress and ECHO scheduled for Monday

## 2019-04-07 NOTE — CARD
--------------- APPROVED REPORT --------------





Date of service: 04/04/2019



EKG Measurement

Heart [id number]

[id number]

[id number]

[id number]

[id number]



<Conclusion>

Normal sinus rhythm

Normal ECG

## 2019-04-07 NOTE — CP.PCM.PN
Subjective





- Date & Time of Evaluation


Date of Evaluation: 04/07/19


Time of Evaluation: 08:00





- Subjective


Subjective: 





afebrile


cardiology on board for clearance








Objective





- Vital Signs/Intake and Output


Vital Signs (last 24 hours): 


                                        











Temp Pulse Resp BP Pulse Ox


 


 98.4 F   81   20   168/76 H  95 


 


 04/07/19 00:00  04/07/19 00:00  04/07/19 00:00  04/07/19 09:53  04/07/19 00:00








Intake and Output: 


                                        











 04/07/19 04/07/19





 06:59 18:59


 


Intake Total 740 


 


Output Total 450 


 


Balance 290 














- Medications


Medications: 


                               Current Medications





Acetaminophen/Codeine Phosphate (Tylenol/Codeine 300 Mg/30 Mg)  1 ea PO Q4 PRN


   PRN Reason: Pain, moderate (4-7)


   Last Admin: 04/05/19 08:13 Dose:  1 ea


Enalapril Maleate (Vasotec)  2.5 mg PO DAILY [facility]


   Last Admin: 04/07/19 09:53 Dose:  2.5 mg


Gabapentin (Neurontin)  100 mg PO QID [facility]


   Last Admin: 04/07/19 13:52 Dose:  100 mg


Heparin Sodium (Porcine) (Heparin)  5,000 units SC Q12 [facility]


   Last Admin: 04/07/19 09:54 Dose:  5,000 units


Cefepime HCl (Maxipime Iv 1 Gm Premix)  1 gm in 50 mls @ 100 mls/hr IVPB Q12H 

[facility]; Protocol


   Last Admin: 04/07/19 08:37 Dose:  100 mls/hr


Vancomycin/Sodium Chloride (Vancomycin 1 Gm/Ns 200 Ml)  1 gm in 200 mls @ 133 

mls/hr IVPB Q24H [facility]; Protocol


   Stop: 04/09/19 23:01


   Last Admin: 04/06/19 22:30 Dose:  133 mls/hr


Insulin Human Regular (Novolin R)  0 unit SC ACHS [facility]; Protocol


   Last Admin: 04/07/19 12:11 Dose:  4 units


Pantoprazole Sodium (Protonix Ec Tab)  40 mg PO DAILY [facility]


   Last Admin: 04/07/19 09:52 Dose:  40 mg


Sertraline HCl (Zoloft)  150 mg PO DAILY [facility]


   Last Admin: 04/07/19 09:53 Dose:  150 mg


Sodium Hypochlorite (Dakins Solution 0.125%)  0 appl TOP DAILY [facility]


   Last Admin: 04/07/19 10:14 Dose:  Not Given











- Labs


Labs: 


                                        





                                 04/06/19 08:53 





                                 04/06/19 08:53 





                                        











PT  13.0 SECONDS (9.7-12.2)  H  04/04/19  15:15    


 


INR  1.2   04/04/19  15:15    


 


APTT  33 SECONDS (21-34)   04/04/19  15:15    














- Constitutional


Appears: Non-toxic, Chronically Ill





- Head Exam


Head Exam: NORMOCEPHALIC





- Eye Exam


Eye Exam: absent: Scleral icterus





- ENT Exam


ENT Exam: Mucous Membranes Dry





- Neck Exam


Neck Exam: absent: Lymphadenopathy





- Respiratory Exam


Respiratory Exam: Decreased Breath Sounds





- Cardiovascular Exam


Cardiovascular Exam: REGULAR RHYTHM





- GI/Abdominal Exam


GI & Abdominal Exam: Distended, Soft





- Rectal Exam


Rectal Exam: Deferred





-  Exam


 Exam: NORMAL INSPECTION





- Extremities Exam


Extremities Exam: Pedal Edema


Additional comments: 





no drainage 


swelling less RLE 





- Back Exam


Back Exam: absent: CVA tenderness (L), CVA tenderness (R)





- Neurological Exam


Neurological Exam: Alert, Awake, Oriented x3





- Psychiatric Exam


Psychiatric exam: Depressed





- Skin


Skin Exam: Dry





Assessment and Plan


(1) Osteomyelitis


Status: Acute   





(2) Cellulitis of right leg


Status: Acute   





(3) Chronic renal insufficiency


Status: Acute   





(4) Diabetic ulcer of foot associated with diabetes mellitus due to underlying 

condition, limited to breakdown of skin


Status: Acute   





(5) Leg pain


Status: Acute   





(6) Musculoskeletal pain


Status: Acute   





- Assessment and Plan (Free Text)


Assessment: 





cont IV antibiotics and wound care


OR on Tuesday if cleared

## 2019-04-07 NOTE — CP.PCM.PN
Subjective





- Date & Time of Evaluation


Date of Evaluation: 04/07/19


Time of Evaluation: 11:40





- Subjective


Subjective: 





Patient seen and evaluated


For stress test and ECHO in am





Review Of Systems


Constitutional: Positive for: Fever, Chills


Cardiovascular: Negative for: Chest Pain


Respiratory: Negative for: Shortness of Breath


Gastrointestinal: Negative for: Nausea, Vomiting


Musculoskeletal: Positive for: Leg Pain (RLE)


Neurological: Negative for: Headache





Physical Exam





- Physical Exam


Appears: No Acute Distress, Other (pale)


Skin: Normal Color, Warm, Dry


Head: Atraumatic, Normacephalic


Eye(s): bilateral: Conjunctiva Pale


Oral Mucosa: Moist


Neck: Normal ROM, Supple


Chest: Symmetrical, No Deformity


Cardiovascular: Rhythm Regular, No Murmur


Respiratory: Normal Breath Sounds, No Rales, No Rhonchi, No Wheezing


Gastrointestinal/Abdominal: Soft, No Tenderness


Extremity: Other (Right lower extremity nonhealing wound to lateral plantar 

surface of foot and smaller wound to the 3rd toe at base of nail, skin around 

RLE indurated, warm, tender, chronic venostasis changes. Left lower extremity 

BKA)


Neurological/Psych: Oriented x3, Normal Speech, Normal Cognition





Assessment & Plan





- Assessment and Plan (Free Text)


Assessment: 





(1) Osteomyelitis


Status: Acute   





(2) Cellulitis of right leg


Status: Acute   





(3) Chronic renal insufficiency


Status: Acute   





(4) Diabetic ulcer of foot associated with diabetes mellitus due to underlying 

condition, limited to breakdown of skin


Status: Acute   





(5) Leg pain


Status: Acute   





(6) Musculoskeletal pain


Status: Acute   








Due to multiple cardiac risk factors, DM and PAD receommend stress and ECHO 

prior to this non urgent surgery


Stress and ECHO scheduled for Monday











Objective





- Vital Signs/Intake and Output


Vital Signs (last 24 hours): 


                                        











Temp Pulse Resp BP Pulse Ox


 


 98.0 F   82   20   173/91 H  94 L


 


 04/07/19 16:00  04/07/19 16:00  04/07/19 16:00  04/07/19 16:00  04/07/19 16:00








Intake and Output: 


                                        











 04/07/19 04/08/19





 18:59 06:59


 


Intake Total 750 650


 


Output Total  500


 


Balance 750 150














- Medications


Medications: 


                               Current Medications





Acetaminophen/Codeine Phosphate (Tylenol/Codeine 300 Mg/30 Mg)  1 ea PO Q4 PRN


   PRN Reason: Pain, moderate (4-7)


   Last Admin: 04/05/19 08:13 Dose:  1 ea


Amlodipine Besylate (Norvasc)  2.5 mg PO DAILY [facility]


   Last Admin: 04/07/19 18:55 Dose:  2.5 mg


Enalapril Maleate (Vasotec)  2.5 mg PO DAILY [facility]


   Last Admin: 04/07/19 09:53 Dose:  2.5 mg


Gabapentin (Neurontin)  100 mg PO QID [facility]


   Last Admin: 04/07/19 21:16 Dose:  100 mg


Heparin Sodium (Porcine) (Heparin)  5,000 units SC Q12 [facility]


   Last Admin: 04/07/19 21:16 Dose:  5,000 units


Cefepime HCl (Maxipime Iv 1 Gm Premix)  1 gm in 50 mls @ 100 mls/hr IVPB Q12H 

[facility]; Protocol


   Last Admin: 04/07/19 20:15 Dose:  100 mls/hr


Vancomycin/Sodium Chloride (Vancomycin 1 Gm/Ns 200 Ml)  1 gm in 200 mls @ 133 

mls/hr IVPB Q24H [facility]; Protocol


   Stop: 04/09/19 23:01


   Last Admin: 04/07/19 22:21 Dose:  133 mls/hr


Insulin Human Regular (Novolin R)  0 unit SC ACHS [facility]; Protocol


   Last Admin: 04/07/19 21:24 Dose:  Not Given


Pantoprazole Sodium (Protonix Ec Tab)  40 mg PO DAILY [facility]


   Last Admin: 04/07/19 09:52 Dose:  40 mg


Sertraline HCl (Zoloft)  150 mg PO DAILY [facility]


   Last Admin: 04/07/19 09:53 Dose:  150 mg


Sodium Hypochlorite (Dakins Solution 0.125%)  0 appl TOP DAILY [facility]


   Last Admin: 04/07/19 10:14 Dose:  Not Given











- Labs


Labs: 


                                        





                                 04/06/19 08:53 





                                 04/06/19 08:53 





                                        











PT  13.0 SECONDS (9.7-12.2)  H  04/04/19  15:15    


 


INR  1.2   04/04/19  15:15    


 


APTT  33 SECONDS (21-34)   04/04/19  15:15

## 2019-04-07 NOTE — CP.PCM.PN
Subjective





- Date & Time of Evaluation


Date of Evaluation: 04/07/19


Time of Evaluation: 11:22





- Subjective


Subjective: 





Podiatry Progress Note Dr. Maza





53M seen and evaluated at bedside for infected right foot wound. Patient is AAO 

x 3 and NAD, resting comfortably in bed. States that he no longer has pain in 

his RLE. Denies any further pedal complaints at this time. Denies 

N/V/F/C/CP/SOB/D. Patient is aware that his surgery has been postponed








Objective





- Vital Signs/Intake and Output


Vital Signs (last 24 hours): 


                                        











Temp Pulse Resp BP Pulse Ox


 


 98.4 F   81   20   168/76 H  95 


 


 04/07/19 00:00  04/07/19 00:00  04/07/19 00:00  04/07/19 09:53  04/07/19 00:00








Intake and Output: 


                                        











 04/07/19 04/07/19





 06:59 18:59


 


Intake Total 740 


 


Output Total 450 


 


Balance 290 














- Medications


Medications: 


                               Current Medications





Acetaminophen/Codeine Phosphate (Tylenol/Codeine 300 Mg/30 Mg)  1 ea PO Q4 PRN


   PRN Reason: Pain, moderate (4-7)


   Last Admin: 04/05/19 08:13 Dose:  1 ea


Enalapril Maleate (Vasotec)  2.5 mg PO DAILY [facility]


   Last Admin: 04/07/19 09:53 Dose:  2.5 mg


Gabapentin (Neurontin)  100 mg PO QID [facility]


   Last Admin: 04/07/19 09:52 Dose:  100 mg


Heparin Sodium (Porcine) (Heparin)  5,000 units SC Q12 [facility]


   Last Admin: 04/07/19 09:54 Dose:  5,000 units


Cefepime HCl (Maxipime Iv 1 Gm Premix)  1 gm in 50 mls @ 100 mls/hr IVPB Q12H 

[facility]; Protocol


   Last Admin: 04/07/19 08:37 Dose:  100 mls/hr


Vancomycin/Sodium Chloride (Vancomycin 1 Gm/Ns 200 Ml)  1 gm in 200 mls @ 133 

mls/hr IVPB Q24H [facility]; Protocol


   Stop: 04/09/19 23:01


   Last Admin: 04/06/19 22:30 Dose:  133 mls/hr


Insulin Human Regular (Novolin R)  0 unit SC ACHS [facility]; Protocol


   Last Admin: 04/07/19 08:36 Dose:  4 units


Pantoprazole Sodium (Protonix Ec Tab)  40 mg PO DAILY [facility]


   Last Admin: 04/07/19 09:52 Dose:  40 mg


Sertraline HCl (Zoloft)  150 mg PO DAILY [facility]


   Last Admin: 04/07/19 09:53 Dose:  150 mg


Sodium Hypochlorite (Dakins Solution 0.125%)  0 appl TOP DAILY [facility]


   Last Admin: 04/07/19 10:14 Dose:  Not Given











- Labs


Labs: 


                                        





                                 04/06/19 08:53 





                                 04/06/19 08:53 





                                        











PT  13.0 SECONDS (9.7-12.2)  H  04/04/19  15:15    


 


INR  1.2   04/04/19  15:15    


 


APTT  33 SECONDS (21-34)   04/04/19  15:15    














- Constitutional


Appears: Well, Non-toxic, No Acute Distress





- Extremities Exam


Additional comments: 





RLE focused exam:





Vasc: DP/PT pulses fully palpable 2/4. Skin temperature increased to periwound 

area but improving. CFT < 3 seconds to all digits. Minimal edema noted to 

periwound area


Neuro: Epicritic and protective sensation grossly diminished b/l


Derm: 3 cm x 2 cm x 0.3 cm necrotic ulceration noted to lateral aspect of right 

foot at level of fifth metatarsal. No drainage, fluctuance, tracking, tunneling 

or undermining appreciated. Malodor no longer present. Hemmorhagic blister noted

to dorsal aspect of third digit, unchanged.


MSK: Minimal pain on palpation to ulceration site. Shortened fifth digit 

secondary to previous partial fifth metatarsal amputation. L BKA appreciated








- Neurological Exam


Neurological Exam: Alert, Awake, Oriented x3





- Psychiatric Exam


Psychiatric exam: Normal Affect





Assessment and Plan





- Assessment and Plan (Free Text)


Assessment: 





53M seen and evaluated at bedside for infected right foot wound


Plan: 





Patient seen and evaluated 


Plan discussed with Dr. Maza


Afebrile, absent leukocytosis


Continue abx per ID


Wound cx Enterococcus Faecalis





R foot xray: Lateral 5th metatarsal ulcer with cortical changes of the 5th 

metatarsal concerning for osteomyelitis


Wound dressed with DSD, Dakins solution


Cardiology consult appreciated


Debridement of ulceration with possible removal of bone is tentatively 

rescheduled to Tuesday 4/9 with Dr. Maza pending results of ECHO, stress test 

and cardio clearance


Podiatry will continue to follow while patient in house

## 2019-04-08 NOTE — PN
DATE:  04/07/2019



SUBJECTIVE:  The patient is seen today, 04/07/2019.  He is not in any

cardiopulmonary distress.



PHYSICAL EXAMINATION:

VITAL SIGNS:  Blood pressure 173/91, temperature 98, respiratory rate 20

and pulse 82.

HEENT:  Pupils equal, reactive to light.  Slightly pale mucosa of the

conjunctivae.

NECK:  Supple.  No JVD, no carotid bruit.  No lymph node.  No thyromegaly.

CHEST AND LUNGS:  Bilateral symmetrical expansion.  Good air exchange.  No

rales, no rhonchi.

CARDIOVASCULAR SYSTEM:  PMI not localized.  S1, S2.  No additional sounds.

ABDOMEN:  Normoactive bowel sounds.  No tenderness, no organomegaly.  No

masses.

EXTREMITIES:  No cyanosis, left BKA and the patient has infected wound on

the right lower extremity.



ASSESSMENT:

1.  Infected wound of right lower extremity, rule out osteomyelitis.

2.  Hypertension, uncontrolled.

3.  Type 2 diabetes mellitus with hyperglycemia.

4.  Chronic kidney disease stage III.



PLAN:  We will add amlodipine 2.5 mg.  Continue current antibiotics. 

Continue the enalapril.  The patient is for both echocardiogram and stress

test tomorrow for possible debridement of the infected wound of the right

foot.







__________________________________________

Liliane Brito MD





DD:  04/07/2019 19:37:15

DT:  04/07/2019 19:38:55

Job # [id number]

## 2019-04-08 NOTE — VASCLAB
Date of service: 



04/05/2019



STUDY DESCRIPTION:  Right Lower Extremity Arterial Exam (PVR).



HISTORY:

 assess vasc for R foot wound debridement 



PRIORS:

None. 



TECHNIQUE:

Pulse volume recording waveforms and segmental pressures of right 

lower extremities at multiple levels were obtained. Ankle Brachial 

Indices (ABIs) were calculated.



Report prepared by   JAYA Lemons, RVT



RIGHT LOWER EXTREMITY:  

* Brachial artery: Pressure - 170 mmHg. 



* High thigh: Pressure -  mmHg: Ratio - : PVR waveform -  Pulsatile



* Low thigh: Pressure -  mmHg: Ratio -   PVR waveform:  Pulsatile



* Calf: Pressure - 220 mmHg: Ratio - NC PVR waveform:  Pulsatile



* Posterior tibial Artery: Pressure - 210 mmHg: Ratio - 1.24 PVR 

waveform:  Pulsatile 



* Dorsalis pedis Artery: Pressure - 209 mmHg: Ratio - 1.23 PVR 

waveform:  Pulsatile



* Great toe: Pressure -  mmHg: Ratio -  PVR waveform:  



Ankle brachial index (JAJA): 1.24



OTHER FINDINGS:  LEFT: BKA



IMPRESSION:

Right: There was no evidence of hemodynamically significant arterial 

insufficiency in the right lower extremity.

## 2019-04-08 NOTE — VASCLAB
Date of service: 



04/05/2019



PROCEDURE:  Right Upper Extremity Venous Duplex Exam



HISTORY:

RIGHT ARM SWELLING 



PRIORS:

None. 



TECHNIQUE:

Right upper extremity, internal jugular, subclavian, axillary, 

brachial, ulnar, radial, basilic and upper cephalic veins were 

evaluated. Flow was assessed with color Doppler, compressibility, 

assessment of phasic flow and augmentation response.



Report prepared by   Yoav Nair, JAYA, RVT



FINDINGS:



RIGHT:

1. Internal Jugular: 

1.1. Compressibility - Fully compressible: Thrombus -  None : Flow - 

Phasic: Augmentation -Normal: Reflux - None.





2. Subclavian:



2.1. Compressibility - Fully compressible: Thrombus -  None : Flow - 

Phasic: Augmentation -Normal: Reflux - None.



3. Axillary: 



3.1. Compressibility - Fully compressible: Thrombus - None :  Flow - 

Phasic: Augmentation -Normal: Reflux - None.



4. Brachial: 



4.1. Compressibility - Fully compressible: Thrombus -  None: Flow - 

Phasic: Augmentation -Normal: Reflux - None.



5. Ulnar:



5.1. Compressibility - Fully compressible: Thrombus -  None: Flow - 

Phasic: Augmentation -Normal: Reflux - None.



6. Radial:



6.1. Compressibility - Fully compressible: Thrombus - None: Flow - 

Phasic: Augmentation - Normal: Reflux - None.



7. Cephalic:



7.1. Compressibility - Fully compressible: Thrombus - None: Flow - 

Phasic: Augmentation -Normal: Reflux - None.



8. Basilic:

8.1.  Compressibility - Fully compressible: Thrombus - None: Flow - 

Phasic: Augmentation -Normal: Reflux - None.





OTHER FINDINGS:  Right: None.



IMPRESSION:

Right: 



No evidence of vein thrombosis of the right upper extremity with 

excellent venous flow. Normal valve function noted of the right side. 





Normal venous flow noted in the left internal jugular and left 

subclavian veins.

## 2019-04-08 NOTE — VASCLAB
Date of service: 



04/05/2019



PROCEDURE:  Right Lower Extremity Venous Duplex Exam. 



HISTORY:

Leg swelling



PRIORS:

None. 



TECHNIQUE:

Right common femoral, femoral, popliteal and posterior tibial, 

peroneal and great saphenous veins were evaluated. Flow was assessed 

with color Doppler, compressibility, assessment of phasic flow and 

augmentation response.



Report prepared by   JAYA Lemons, RVT



FINDINGS:



RIGHT:

1. Common Femoral Vein: 



1.1. Compressibility - Fully compressible: Thrombus -  None: Flow - 

Phasic: Augmentation -Normal: Reflux - None.



2. Femoral Vein: 



2.1. Compressibility - Fully compressible: Thrombus -  None: Flow - 

Phasic: Augmentation -Normal: Reflux - None.



3. Popliteal Vein: 



3.1. Compressibility - Fully compressible: Thrombus -  None: Flow - 

Phasic: Augmentation -Normal: Reflux - None.



4. Posterior Tibial Vein: 



4.1. Compressibility - : Thrombus -  : Flow - : Augmentation -: 

Reflux - .



5. Peroneal Vein: 



5.1. Compressibility - : Thrombus - :  Flow - : Augmentation -: 

Reflux - .



6. Great Saphenous Vein:

6.1.  Compressibility - Fully compressible: Thrombus -None: Flow - 

Phasic: Augmentation - Normal: Reflux - None.





OTHER FINDINGS:  Due to swelling in the calf, the right peroneal and 

posterior tibial vein ware not visualized.



IMPRESSION:

No evidence of deep or superficial vein thrombosis of the right lower 

extremity with excellent venous flow. Normal valve function noted of 

the right side.     



Normal venous flow noted in the left common femoral vein.

## 2019-04-08 NOTE — CP.PCM.PN
Subjective





- Date & Time of Evaluation


Date of Evaluation: 04/08/19


Time of Evaluation: 09:00





- Subjective


Subjective: 





Vanco on hold


creat rising


switch to cubicin 


consider renal eval 





Objective





- Vital Signs/Intake and Output


Vital Signs (last 24 hours): 


                                        











Temp Pulse Resp BP Pulse Ox


 


 97.8 F   84   20   166/84 H  94 L


 


 04/08/19 07:46  04/08/19 07:46  04/08/19 07:46  04/08/19 07:46  04/08/19 07:46








Intake and Output: 


                                        











 04/08/19 04/08/19





 06:59 18:59


 


Intake Total 650 


 


Output Total 1200 


 


Balance -550 














- Medications


Medications: 


                               Current Medications





Acetaminophen/Codeine Phosphate (Tylenol/Codeine 300 Mg/30 Mg)  1 ea PO Q4 PRN


   PRN Reason: Pain, moderate (4-7)


   Last Admin: 04/05/19 08:13 Dose:  1 ea


Amlodipine Besylate (Norvasc)  2.5 mg PO DAILY Counts include 234 beds at the [facility]


   Last Admin: 04/08/19 09:53 Dose:  Not Given


Enalapril Maleate (Vasotec)  2.5 mg PO DAILY Counts include 234 beds at the [facility]


   Last Admin: 04/08/19 09:54 Dose:  Not Given


Gabapentin (Neurontin)  100 mg PO QID Counts include 234 beds at the [facility]


   Last Admin: 04/08/19 09:53 Dose:  Not Given


Cefepime HCl (Maxipime Iv 1 Gm Premix)  1 gm in 50 mls @ 100 mls/hr IVPB Q12H 

[facility]; Protocol


   Last Admin: 04/08/19 09:53 Dose:  Not Given


Insulin Human Regular (Novolin R)  0 unit SC ACHS [facility]; Protocol


   Last Admin: 04/08/19 11:47 Dose:  Not Given


Pantoprazole Sodium (Protonix Ec Tab)  40 mg PO DAILY Counts include 234 beds at the [facility]


   Last Admin: 04/08/19 09:54 Dose:  Not Given


Sertraline HCl (Zoloft)  150 mg PO DAILY Counts include 234 beds at the [facility]


   Last Admin: 04/08/19 09:54 Dose:  Not Given


Sodium Hypochlorite (Dakins Solution 0.125%)  0 appl TOP DAILY Counts include 234 beds at the [facility]


   Last Admin: 04/08/19 09:53 Dose:  Not Given











- Labs


Labs: 


                                        





                                 04/08/19 06:44 





                                 04/08/19 06:44 





                                        











PT  13.0 SECONDS (9.7-12.2)  H  04/04/19  15:15    


 


INR  1.2   04/04/19  15:15    


 


APTT  33 SECONDS (21-34)   04/04/19  15:15    














Assessment and Plan


(1) Osteomyelitis


Status: Acute   





(2) Cellulitis of right leg


Status: Acute   





(3) Chronic renal insufficiency


Status: Acute   





(4) Diabetic ulcer of foot associated with diabetes mellitus due to underlying 

condition, limited to breakdown of skin


Status: Acute   





(5) Leg pain


Status: Acute   





(6) Musculoskeletal pain


Status: Acute

## 2019-04-08 NOTE — CARD
--------------- APPROVED REPORT --------------





Date of service: 04/08/2019



EXAM: Two-dimensional and M-mode echocardiogram with Doppler and 

color Doppler.



Other Information 

Quality : GoodRhythm : 



INDICATION

Pre-Op PAD, chronic renal insufficiency



RISK FACTORS

Hypertension 

Diabetes



2D DIMENSIONS 

IVSd0.9   (0.7-1.1cm)LVDd4.9   (3.9-5.9cm)

PWd0.9   (0.7-1.1cm)LA [id number]   (18-58mL)

LVDs3.2   (2.5-4.0cm)FS (%) 34.4   %

LVEF (%)63.3   (>50%)LVEF (Villar's)65 %



M-Mode DIMENSIONS 

Left Atrium (MM)3.43   (2.5-4.0cm)IVSd0.82   (0.7-1.1cm)

Aortic Root3.34   (2.2-3.7cm)LVDd5.50   (4.0-5.6cm)

Aortic Cusp Exc.2.26   (1.5-2.0cm)PWd0.88   (0.7-1.1cm)

FS (%) 37   %LVDs3.45   (2.0-3.8cm)

LVEF (%)67   (>50%)



Mitral Valve

MV E Hbdmaila765.3cm/sMV A Jgdfdaji685.5cm/sE/A ratio1.2



TDI

Lateral E' Peak V7.45cm/sMedial E' Peak V8.55cm/sE/Lateral E'16.1

E/Medial E'14.1



Tricuspid Valve

TR Peak [id number]/sTR Peak Gr.[id number]



 LEFT VENTRICLE 

The left ventricle is normal size.

There is normal left ventricular wall thickness.

The left ventricular function is normal.

The left ventricular ejection fraction is within the normal range.

There is normal LV segmental wall motion.

The left ventricular diastolic function is normal.



 RIGHT VENTRICLE 

The right ventricle is normal size.

There is normal right ventricular wall thickness.

The right ventricular systolic function is normal.



 ATRIA 

The left atrium size is normal.

The right atrium size is normal.



 AORTIC VALVE 

The aortic valve is normal in structure.

No aortic regurgitation is present.

There is no aortic valvular stenosis. 



 MITRAL VALVE 

The mitral valve is normal in structure.

There is no mitral valve stenosis.

Mitral regurgitation is trace to mild.



 TRICUSPID VALVE 

There is mild to moderate tricuspid regurgitation.

There is mild to moderate pulmonary hypertension.



 PULMONIC VALVE 

There is trace to mild pulmonic valvular regurgitation. 



 GREAT VESSELS 

The aortic root is normal in size.

The IVC is dilated.



 PERICARDIAL EFFUSION 

There is no pericardial effusion.



<Conclusion>

The left ventricle is normal size.

There is normal left ventricular wall thickness.

The left ventricular function is normal.

The left ventricular ejection fraction is within the normal range.

There is normal LV segmental wall motion.

The left ventricular diastolic function is normal.

Mitral regurgitation is trace to mild.

There is mild to moderate tricuspid regurgitation.

There is mild to moderate pulmonary hypertension.

## 2019-04-08 NOTE — PN
DATE:  04/08/2019



DAILY PROGRESS NOTE



SUBJECTIVE:  The patient is seen today, 04/08/2019.  He is not in any

cardiopulmonary distress.  He is status post echocardiogram and stress

test.



PHYSICAL EXAMINATION:

VITAL SIGNS:  Blood pressure 161/79, temperature 98.7, respiratory rate 20,

and pulse 82.

HEENT:  Pupils equal and reactive to light.  Pale mucosa of the

conjunctivae.

NECK:  Supple.  No JVD.  No carotid bruit.  No lymph node.  No thyromegaly.

CHEST AND LUNGS:  Bilateral symmetrical expansion.  Good air exchange.  No

rales.  No rhonchi.

CARDIOVASCULAR SYSTEM:  PMI not localized.  S1 and S2.  No additional

sounds.

ABDOMEN:  Normoactive bowel sounds.  No tenderness.  No organomegaly.  No

masses.

EXTREMITIES:  No cyanosis, no clubbing on the right lower extremity.  Left

BKA.

CENTRAL NERVOUS SYSTEM:  Alert, awake, oriented x3.  No neurological

deficit could be appreciated.



ASSESSMENT:

1.  Infected right diabetic foot.

2.  Hypertension.

3.  Chronic kidney disease.

4.  Anemia of chronic disease, status post packed red blood cells

transfusion.



PLAN:  Follow the results of the stress test and echocardiogram.  The

patient is for debridement of the right foot infection.  Continue current

antibiotics.







__________________________________________

Eran MD Daryl





DD:  04/08/2019 16:34:34

DT:  04/08/2019 16:35:49

Job # [id number]

## 2019-04-08 NOTE — CP.PCM.PN
Subjective





- Date & Time of Evaluation


Date of Evaluation: 04/08/19


Time of Evaluation: 15:49





- Subjective


Subjective: 





Podiatry Progress Note for Dr. Maza





53M seen and evaluated at bedside for infected right foot wound. Patient is AAO 

x 3 and NAD, resting comfortably in bed. States that he continues to have no 

pain in his RLE. Denies any further pedal complaints at this time. Denies 

N/V/F/C/CP/SOB/D. Patient is aware that he has surgery tomorrow and that he will

be NPO past midnight tonight





Objective





- Vital Signs/Intake and Output


Vital Signs (last 24 hours): 


                                        











Temp Pulse Resp BP Pulse Ox


 


 97.8 F   84   20   162/80 H  94 L


 


 04/08/19 07:46  04/08/19 07:46  04/08/19 07:46  04/08/19 14:03  04/08/19 07:46








Intake and Output: 


                                        











 04/08/19 04/08/19





 06:59 18:59


 


Intake Total 650 


 


Output Total 1200 


 


Balance -550 














- Medications


Medications: 


                               Current Medications





Acetaminophen/Codeine Phosphate (Tylenol/Codeine 300 Mg/30 Mg)  1 ea PO Q4 PRN


   PRN Reason: Pain, moderate (4-7)


   Last Admin: 04/05/19 08:13 Dose:  1 ea


Amlodipine Besylate (Norvasc)  2.5 mg PO DAILY [facility]


   Last Admin: 04/08/19 14:00 Dose:  2.5 mg


Enalapril Maleate (Vasotec)  2.5 mg PO DAILY [facility]


   Last Admin: 04/08/19 14:03 Dose:  2.5 mg


Gabapentin (Neurontin)  100 mg PO QID [facility]


   Last Admin: 04/08/19 14:54 Dose:  100 mg


Cefepime HCl (Maxipime Iv 1 Gm Premix)  1 gm in 50 mls @ 100 mls/hr IVPB Q12H 

[facility]; Protocol


   Last Admin: 04/08/19 09:53 Dose:  Not Given


Daptomycin 400 mg/ Sodium (Chloride)  100 mls @ 100 mls/hr IV Q24H [facility]; Protocol


   Stop: 04/13/19 14:01


   Last Admin: 04/08/19 14:53 Dose:  100 mls/hr


Insulin Human Regular (Novolin R)  0 unit SC ACHS [facility]; Protocol


   Last Admin: 04/08/19 11:47 Dose:  Not Given


Pantoprazole Sodium (Protonix Ec Tab)  40 mg PO DAILY [facility]


   Last Admin: 04/08/19 14:00 Dose:  40 mg


Sertraline HCl (Zoloft)  150 mg PO DAILY [facility]


   Last Admin: 04/08/19 14:00 Dose:  150 mg


Sodium Hypochlorite (Dakins Solution 0.125%)  0 appl TOP DAILY [facility]


   Last Admin: 04/08/19 09:53 Dose:  Not Given











- Labs


Labs: 


                                        





                                 04/08/19 06:44 





                                 04/08/19 06:44 





                                        











PT  13.0 SECONDS (9.7-12.2)  H  04/04/19  15:15    


 


INR  1.2   04/04/19  15:15    


 


APTT  33 SECONDS (21-34)   04/04/19  15:15    














- Constitutional


Appears: Well, Non-toxic, No Acute Distress





- Extremities Exam


Additional comments: 





RLE focused exam:





Vasc: DP/PT pulses fully palpable 2/4. Skin temperature warm to warm from 

proximal to distal WNL. CFT < 3 seconds to all digits. Minimal edema noted to 

periwound area


Neuro: Epicritic and protective sensation grossly diminished b/l


Derm: 3 cm x 2 cm x 0.3 cm necrotic ulceration noted to lateral aspect of right 

foot at level of fifth metatarsal. No drainage, fluctuance, tracking, tunneling 

or undermining appreciated. No malodor noted at this time. Hemmorhagic blister 

noted to dorsal aspect of third digit, improving.


MSK: Minimal pain on palpation to ulceration site. Shortened fifth digit 

secondary to previous partial fifth metatarsal amputation. L BKA appreciated








- Neurological Exam


Neurological Exam: Alert, Awake, Oriented x3





- Psychiatric Exam


Psychiatric exam: Normal Affect, Normal Mood





Assessment and Plan





- Assessment and Plan (Free Text)


Assessment: 





53M seen and evaluated at bedside for infected right foot wound


Plan: 





Patient seen and evaluated 


Plan discussed with Dr. Maza


Afebrile


Continue abx per ID


Wound cx Enterococcus Faecalis





R foot xray: Lateral 5th metatarsal ulcer with cortical changes of the 5th 

metatarsal concerning for osteomyelitis


Wound dressed with DSD, Dakins solution


Cardiology consult appreciated


Debridement of ulceration with possible removal of bone is tentatively 

rescheduled to Tuesday 4/9 at 8 AM with Dr. Maza pending results of ECHO, 

stress test and cardio clearance


Podiatry will continue to follow while patient in house

## 2019-04-09 NOTE — CARD
--------------- APPROVED REPORT --------------





Date of service: 04/08/2019



Protocol: LEXISCAN

Test Type: LEXISCAN STRESS

Test Indications: PRE OP

Target HR: 168 bpm

Resting ECG: NSR

Resting Heart Rate: 80 bpm

Resting Blood Pressure: 144/80mmHg

submaximum (85%): 143 bpm



TEST SUMMARY

WDVKRZQGDTNHOQ77:02..1.0./.0.

PREINFSNHYPERV.02:[id number]/80.0.

INFUSIONDOSE 100:300.00.01.083/.0.

[id number]:[id number]/80.0.



PROCEDURE

Pharmacologic stress testing was performed using 0.4mg per 5ml of 

regadenoson given intravenously over 7-10 seconds.



POST EXERCISE

Reason for Termination: Protocol Completed

Target HR: No

Max HR: 83 bpm

55% of Maximum Predicted HR: 168 bpm

Exercise duration: 00:30 min:sec, 0 Stage

Exercise capacity: 1.0METs

Max Blood Pressure: 144/80mmHg

Blood Pressure response to exercise: normal resting BP - appropriate 

response

Heart Rate response to exercise: appropriate

Chest Pain: No, none

Angina index: 0

Arrhythmia: No, none

ST Change: No, none

Deviation: 0 mm



INTERPRETATION

Stress EKG Conclusion: NEGATIVE LEXISCAN STRESS TEST



NORMAL BP RESPONSE TO LEXISCAN



NUCLEAR STUDIES TO BE READ SEPARATELY



EXAM: Myocardial Perfusion STRESS/REST



Imaging Protocol

The imaging protocol used to acquire images was Stress Tc-99m/rest 

Tc-99m 1 day

Stress Spect myocardial perfusion imaging was performed in supine 

position 45 minutes following the injection of 13.2 mCi of Tc-99 

Myoview.

Gated Rest Spect was performed 44 minutes after intravenous 31Tc-99 

Myoview injection.

The images were gated to evaluate regional wall motion and calculate 

ventricular ejection fraction.Images were reconstructed using 

backfilter projection method in short horizontal and verticle long 

axis. Spect slices were generated.



RESTING DATA

YQF643.25ypFR9.80L/min

ESV36.00mlMyocardial Ifpw124.00g

Av. Heart Rate78.00bpm

EF71.00%



STRESS DATA

YOK120.75zhVL0.10L/min

ESV45.00mlMyocardial Qooe146.00g

EF62.00%

Regional WT score at stress:1.00

Regional WM score at stress:0.00

Summed WT score at stress:7.00

Av. Heart Rate84.00bpmSummed WM score at stress:2.00



LV Perf. Quant

17 Seg. SSS1.00

17 Seg. SRS0.00

17 Seg. SDS1.00

Stress Defect Extent (% LAD)0.00Rest Defect Extent (% 

LAD)0.00Rev. Defect Extent (% LAD)0.00

Stress Defect Extent (% LCX)0.00Rest Defect Extent (% 

LCX)0.00Rev. Defect Extent (% LCX)0.00

Stress Defect Extent (% RCA)0.00Rest Defect Extent (% 

RCA)0.00Rev. Defect Extent (% RCA)0.00

Stress Defect Extent (% BRIAN)0.00Rest Defect Extent (% 

BRIAN)0.00Rev. Defect Extent (% BRIAN)0.00



IMPRESSION

Normal Myocardial Perfusion exercise stress study



Left Ventricle

LV Function:Left ventricle systolic function is normal.

The Ejection Fraction is 60-65%.



Metabolism/Perfusion

There are no defects.

There are no perfusion/metabolism defects.



Conclusion

1. There is no stress-induced ischemia noted.

2. Left ventricle systolic function is normal.

3. The Ejection Fraction is 60-65%.

## 2019-04-09 NOTE — RAD
Date of service: 



04/09/2019



PROCEDURE:  Right Foot Radiographs.



HISTORY:

 s/p bone biopsy right foot 



COMPARISON:

4/4/2019



TECHNIQUE:

3 views obtained.



FINDINGS:



BONES:

The lateral soft tissue ulcer lesion is reproduced 



Overlying bandaging in soft tissue and crease density here is seen.  

The lateral left 5th metatarsal base bony contour and cortex here 

compatible with osteomyelitis as well as the referenced subsequent 

bone biopsy to assess for this. 



The partial amputated status of this distal left 5th metatarsal bone 

is as before.  B



Osteopenia of the cuboid bone-limited evaluation here. 



The flattening 2nd metatarsal head and arthropathic changes of the 

2nd metatarsal joint as before 3 osteophytic spurring of the 2nd 

distal phalanx is as before.  Hypertrophic changes of the 3rd 

proximal phalanx accentuated on this exam. 



Re-noted are accessory ossifications centers bordering the navicular 

cuboid bone. 



JOINTS:

Normal. 



SOFT TISSUES:

Ulcer bordering the left 5th metatarsal base overlying soft tissue 

increased density mixed lucencies consistent with debridement biopsy 

and bandaging here.  Phlebolith noted lateral left lower extremity 



OTHER FINDINGS:

None.



IMPRESSION:

Left 5th metatarsal bone changes compatible with both osteomyelitis 

recent biopsy to assess for the latter.  Regional ulcer with 

bandaging.  Inferred cellulitis here.  Correlate clinically 



Other findings as above.

## 2019-04-09 NOTE — CP.PCM.PN
Subjective





- Date & Time of Evaluation


Date of Evaluation: 04/09/19


Time of Evaluation: 09:21





- Subjective


Subjective: 


Podiatry Progress Note for Dr. Maza





53M seen and evaluated at bedside for infected right foot wound. Patient is AAO 

x 3 and NAD, resting comfortably in bed. States that he continues to have no 

pain in his RLE. Aware of surgery today, patient admits to being NPO past 

midnight. denies acute overnight events 








Objective





- Vital Signs/Intake and Output


Vital Signs (last 24 hours): 


                                        











Temp Pulse Resp BP Pulse Ox


 


 97.9 F   84   20   160/77 H  98 


 


 04/09/19 06:44  04/09/19 06:44  04/09/19 06:44  04/09/19 06:44  04/09/19 00:00








Intake and Output: 


                                        











 04/09/19 04/09/19





 06:59 18:59


 


Intake Total 300 


 


Balance 300 














- Medications


Medications: 


                               Current Medications





Amlodipine Besylate (Norvasc)  2.5 mg PO DAILY [facility]


   Last Admin: 04/08/19 14:00 Dose:  2.5 mg


Enalapril Maleate (Vasotec)  2.5 mg PO DAILY [facility]


   Last Admin: 04/08/19 14:03 Dose:  2.5 mg


Gabapentin (Neurontin)  100 mg PO QID [facility]


   Last Admin: 04/08/19 21:49 Dose:  100 mg


Cefepime HCl (Maxipime Iv 1 Gm Premix)  1 gm in 50 mls @ 100 mls/hr IVPB Q12H 

[facility]; Protocol


   Last Admin: 04/08/19 20:00 Dose:  100 mls/hr


Daptomycin 400 mg/ Sodium (Chloride)  100 mls @ 100 mls/hr IV Q24H [facility]; Protocol


   Stop: 04/13/19 14:01


   Last Admin: 04/08/19 14:53 Dose:  100 mls/hr


Insulin Human Regular (Novolin R)  0 unit SC ACHS [facility]; Protocol


   Last Admin: 04/08/19 21:50 Dose:  Not Given


Pantoprazole Sodium (Protonix Ec Tab)  40 mg PO DAILY [facility]


   Last Admin: 04/08/19 14:00 Dose:  40 mg


Sertraline HCl (Zoloft)  150 mg PO DAILY [facility]


   Last Admin: 04/08/19 14:00 Dose:  150 mg


Sodium Hypochlorite (Dakins Solution 0.125%)  0 appl TOP DAILY [facility]


   Last Admin: 04/08/19 09:53 Dose:  Not Given











- Labs


Labs: 


                                        





                                 04/08/19 06:44 





                                 04/08/19 06:44 





                                        











PT  12.1 SECONDS (9.7-12.2)   04/08/19  20:59    


 


INR  1.1   04/08/19  20:59    


 


APTT  30 SECONDS (21-34)   04/08/19  20:59    














- Constitutional


Appears: Well, Non-toxic





- Head Exam


Head Exam: ATRAUMATIC, NORMOCEPHALIC





- Eye Exam


Eye Exam: Normal appearance





- ENT Exam


ENT Exam: Mucous Membranes Moist





- Cardiovascular Exam


Cardiovascular Exam: REGULAR RHYTHM





- Extremities Exam


Additional comments: 


Dressing d/c/i





from previous note-


RLE focused exam:





Vasc: DP/PT pulses fully palpable 2/4. Skin temperature warm to warm from 

proximal to distal WNL. CFT < 3 seconds to all digits. Minimal edema noted to 

periwound area


Neuro: Epicritic and protective sensation grossly diminished b/l


Derm: 3 cm x 2 cm x 0.3 cm necrotic ulceration noted to lateral aspect of right 

foot at level of fifth metatarsal. No drainage, fluctuance, tracking, tunneling 

or undermining appreciated. No malodor noted at this time. Hemmorhagic blister 

noted to dorsal aspect of third digit, improving.


MSK: Minimal pain on palpation to ulceration site. Shortened fifth digit 

secondary to previous partial fifth metatarsal amputation. L BKA appreciated








- Neurological Exam


Neurological Exam: Alert, Awake





- Psychiatric Exam


Psychiatric exam: Normal Affect





Assessment and Plan





- Assessment and Plan (Free Text)


Assessment: 





53M seen and evaluated at bedside for infected right foot wound





Plan: 





Patient seen and evaluated 


Plan discussed with Dr. Shaunna Martin


Continue abx per ID


Wound cx Enterococcus Faecalis and MRSA





R foot xray: Lateral 5th metatarsal ulcer with cortical changes of the 5th 

metatarsal concerning for osteomyelitis


Cardiology on board; Low risk for cardiac events for foot surgery under general 

anaesthesia or Conscious sedation


Debridement of ulceration with biopsy of bone today


Podiatry will continue to follow while patient in house

## 2019-04-09 NOTE — PCM.SURG1
Surgeon's Initial Post Op Note





- Surgeon's Notes


Surgeon: Dr. Pacheco Maza


Assistant: Shonda Wilson PGY1, Aguila Garza PGY2, Kathie Brown MS3


Type of Anesthesia: IV Sedation, Local


Anesthesia Administered By: Dr. Silva


Pre-Operative Diagnosis: Right foot infected ulcer with possible ostoemyelitis 

of the right fifth metatarsal


Operative Findings: see dictation.  materials: 1/2 inch packing.  injectibles: 

10 cc of .25% marcaine plain and 2% lidocaine plain


Post-Operative Diagnosis: same


Operation Performed: right foot wound debridement and bone biopsy of the right 

fifth metatarsal


Specimen/Specimens Removed: pathology: bone biopsy from right fifth metatarsal. 

cultures: deep tissue and bone cultures


Estimated Blood Loss: EBL {In ML}: 20


Blood Products Given: N/A


Drains Used: No Drains


Date of Surgery/Procedure: 04/09/19


Time of Surgery/Procedure: 09:28

## 2019-04-09 NOTE — PN
DATE:  04/09/2019



SUBJECTIVE:  The patient is seen today, 04/09/2019.  He is status post

debridement of the right foot wound.



PHYSICAL EXAMINATION:

VITAL SIGNS:  Blood pressure is 153/76, temperature 97.8, respiratory rate

20 and pulse 80.

HEENT:  Pupils equal, reactive to light.  Normal-appearing mucosa of the

conjunctivae, oropharynx and nasal membrane mucosa.

NECK:  Supple.  No JVD, no carotid bruit.  No lymph node.  No thyromegaly.

CHEST AND LUNGS:  Bilateral symmetrical expansion.  Good air exchange.  No

rales, no rhonchi.

CARDIOVASCULAR:  PMI not localized.  S1, S2.  No additional sounds.

ABDOMEN:  Normoactive bowel sounds.  No tenderness, no organomegaly.  No

masses.

EXTREMITIES:  Left BKA and right foot is surgically dressed.



ASSESSMENT:

1.  Infected right foot wound.

2.  Diabetic foot.

3.  Chronic kidney disease.

4.  Anemia of chronic disease.

5.  Type 2 diabetes mellitus with hyperglycemia.

6.  Chronic kidney disease.



PLAN:  We will start the patient on Procrit at 10,000 units Monday,

Wednesday and Friday and continue current antibiotics.







__________________________________________

Liliane Brito MD





DD:  04/09/2019 15:23:30

DT:  04/09/2019 15:24:28

Job # [id number]

## 2019-04-09 NOTE — CP.PCM.PN
Subjective





- Date & Time of Evaluation


Date of Evaluation: 04/09/19


Time of Evaluation: 09:00





- Subjective


Subjective: 





seen on rounds


s/p 5th ray resection


c/o pain  which is tolerable








Objective





- Vital Signs/Intake and Output


Vital Signs (last 24 hours): 


                                        











Temp Pulse Resp BP Pulse Ox


 


 97.8 F   80   20   153/76 H  95 


 


 04/09/19 10:46  04/09/19 10:46  04/09/19 10:46  04/09/19 10:46  04/09/19 10:46








Intake and Output: 


                                        











 04/09/19 04/09/19





 06:59 18:59


 


Intake Total 300 450


 


Balance 300 450














- Medications


Medications: 


                               Current Medications





Amlodipine Besylate (Norvasc)  2.5 mg PO DAILY [facility]


   Last Admin: 04/09/19 10:43 Dose:  2.5 mg


Enalapril Maleate (Vasotec)  2.5 mg PO DAILY [facility]


   Last Admin: 04/09/19 10:44 Dose:  2.5 mg


Gabapentin (Neurontin)  100 mg PO QID [facility]


   Last Admin: 04/09/19 10:43 Dose:  100 mg


Cefepime HCl (Maxipime Iv 1 Gm Premix)  1 gm in 50 mls @ 100 mls/hr IVPB Q12H 

[facility]; Protocol


   Last Admin: 04/09/19 10:44 Dose:  Not Given


Daptomycin 400 mg/ Sodium (Chloride)  100 mls @ 100 mls/hr IV Q24H [facility]; Protocol


   Stop: 04/13/19 14:01


   Last Admin: 04/08/19 14:53 Dose:  100 mls/hr


Lactated Ringer's (Lactated Ringer's)  1,000 mls @ 100 mls/hr IV .Q10H [facility]


Insulin Human Regular (Novolin R)  0 unit SC ACHS [facility]; Protocol


   Last Admin: 04/09/19 10:45 Dose:  Not Given


Pantoprazole Sodium (Protonix Ec Tab)  40 mg PO DAILY [facility]


   Last Admin: 04/09/19 10:43 Dose:  40 mg


Sertraline HCl (Zoloft)  150 mg PO DAILY [facility]


   Last Admin: 04/09/19 10:42 Dose:  150 mg


Sodium Hypochlorite (Dakins Solution 0.125%)  0 appl TOP DAILY [facility]


   Last Admin: 04/09/19 11:08 Dose:  Not Given











- Labs


Labs: 


                                        





                                 04/08/19 06:44 





                                 04/08/19 06:44 





                                        











PT  12.1 SECONDS (9.7-12.2)   04/08/19  20:59    


 


INR  1.1   04/08/19  20:59    


 


APTT  30 SECONDS (21-34)   04/08/19  20:59    














- Constitutional


Appears: Non-toxic, Chronically Ill





- Head Exam


Head Exam: ATRAUMATIC, NORMAL INSPECTION, NORMOCEPHALIC





- Eye Exam


Eye Exam: EOMI, Normal appearance, PERRL


Pupil Exam: NORMAL ACCOMODATION, PERRL





- ENT Exam


ENT Exam: Mucous Membranes Moist, Normal Exam





- Neck Exam


Neck Exam: Full ROM, Normal Inspection.  absent: Lymphadenopathy





- Respiratory Exam


Respiratory Exam: Clear to Ausculation Bilateral, NORMAL BREATHING PATTERN





- Cardiovascular Exam


Cardiovascular Exam: REGULAR RHYTHM, +S1, +S2.  absent: Murmur





- GI/Abdominal Exam


GI & Abdominal Exam: Soft, Normal Bowel Sounds.  absent: Tenderness





- Rectal Exam


Rectal Exam: Deferred





-  Exam


 Exam: NORMAL INSPECTION





- Extremities Exam


Extremities Exam: Full ROM, Normal Capillary Refill, Normal Inspection.  absent:

Joint Swelling, Pedal Edema





- Back Exam


Back Exam: NORMAL INSPECTION





- Neurological Exam


Neurological Exam: Alert, Awake, CN II-XII Intact, Normal Gait, Oriented x3





- Psychiatric Exam


Psychiatric exam: Normal Affect, Normal Mood





- Skin


Skin Exam: Dry, Warm.  absent: Normal Color


Additional comments: 





dressing in [lace RLE


Left BKA





Assessment and Plan


(1) Osteomyelitis


Status: Acute   





(2) Cellulitis of right leg


Status: Acute   





(3) Chronic renal insufficiency


Status: Acute   





(4) Diabetic ulcer of foot associated with diabetes mellitus due to underlying 

condition, limited to breakdown of skin


Status: Acute   





(5) Leg pain


Status: Acute   





(6) Musculoskeletal pain


Status: Acute   





- Assessment and Plan (Free Text)


Assessment: 





cont rx for OM   Cubicin/ Cefepime

## 2019-04-10 NOTE — CP.PCM.PN
Subjective





- Date & Time of Evaluation


Date of Evaluation: 04/10/19


Time of Evaluation: 16:01





- Subjective


Subjective: 





Podiatry Progress Note for Dr. Maza





53M seen one day s/p debridement of lateral right foot wound. Patient is AAO x 3

and NAD, resting comfortably in bed and is accompanied by his wife. States that 

he has no pain at his wound site but does have pain to his posterior right heel.

Denies any acute overnight events or new pedal complaints. Denies any recent 

N/V/F/C/CP/SOB?D





Objective





- Vital Signs/Intake and Output


Vital Signs (last 24 hours): 


                                        











Temp Pulse Resp BP Pulse Ox


 


 98.1 F   83   20   163/78 H  95 


 


 04/10/19 07:29  04/10/19 07:29  04/10/19 07:29  04/10/19 09:52  04/10/19 07:29








Intake and Output: 


                                        











 04/10/19 04/10/19





 06:59 18:59


 


Intake Total 300 400


 


Output Total 500 


 


Balance -200 400














- Medications


Medications: 


                               Current Medications





Acetaminophen/Codeine Phosphate (Tylenol/Codeine 300 Mg/30 Mg)  1 ea PO Q4 PRN


   PRN Reason: Pain, Mild (1-3)


   Last Admin: 04/10/19 13:12 Dose:  1 ea


Amlodipine Besylate (Norvasc)  2.5 mg PO DAILY [facility]


   Last Admin: 04/10/19 14:40 Dose:  2.5 mg


Enalapril Maleate (Vasotec)  2.5 mg PO DAILY [facility]


   Last Admin: 04/10/19 09:52 Dose:  2.5 mg


Gabapentin (Neurontin)  100 mg PO QID [facility]


   Last Admin: 04/10/19 13:09 Dose:  100 mg


Cefepime HCl (Maxipime Iv 1 Gm Premix)  1 gm in 50 mls @ 100 mls/hr IVPB Q12H 

[facility]; Protocol


   Last Admin: 04/10/19 08:02 Dose:  100 mls/hr


Daptomycin 400 mg/ Sodium (Chloride)  100 mls @ 100 mls/hr IV Q24H [facility]; Protocol


   Stop: 04/13/19 14:01


   Last Admin: 04/10/19 14:27 Dose:  100 mls/hr


Lactated Ringer's (Lactated Ringer's)  1,000 mls @ 100 mls/hr IV .Q10H [facility]


Insulin Human Regular (Novolin R)  0 unit SC ACHS [facility]; Protocol


   Last Admin: 04/10/19 11:35 Dose:  10 units


Pantoprazole Sodium (Protonix Ec Tab)  40 mg PO DAILY [facility]


   Last Admin: 04/10/19 09:52 Dose:  40 mg


Sertraline HCl (Zoloft)  150 mg PO DAILY [facility]


   Last Admin: 04/10/19 09:52 Dose:  150 mg


Sodium Hypochlorite (Dakins Solution 0.125%)  0 appl TOP DAILY [facility]


   Last Admin: 04/10/19 09:53 Dose:  20 appl











- Labs


Labs: 


                                        





                                 04/10/19 06:31 





                                 04/10/19 06:31 





                                        











PT  12.1 SECONDS (9.7-12.2)   04/08/19  20:59    


 


INR  1.1   04/08/19  20:59    


 


APTT  30 SECONDS (21-34)   04/08/19  20:59    














- Constitutional


Appears: Well, Non-toxic, No Acute Distress





- Extremities Exam


Additional comments: 





RLE focused exam:





Vasc: DP/PT pulses fully palpable 2/4. Skin temperature warm to warm from 

proximal to distal WNL. CFT < 3 seconds to all digits. Minimal edema noted to 

periwound area


Neuro: Epicritic and protective sensation grossly diminished b/l


Derm: 3.5 cm x 2.3 cm x 0.4 cm ulceration noted to lateral aspect of right foot 

at level of fifth metatarsal. Wound base is fibrogranular in nature and fifth 

metatarsal bone is exposed. Minimal serous drainage noted. No purulence, 

fluctuance, tracking, tunneling or undermining appreciated. No malodor noted at 

this time. Hemmorhagic blister noted to dorsal aspect of third digit continues 

to improve.


MSK: Minimal pain on palpation to ulceration site. Shortened fifth digit 

secondary to previous partial fifth metatarsal amputation. L BKA appreciated





- Neurological Exam


Neurological Exam: Alert, Awake, Oriented x3





- Psychiatric Exam


Psychiatric exam: Normal Affect, Normal Mood





Assessment and Plan





- Assessment and Plan (Free Text)


Assessment: 





53M seen one day s/p debridement of lateral right foot wound


Plan: 





Patient seen and evaluated with Dr. Maza


Afebrile, absent leukocytosis


Continue abx per ID


Intra-operative wound cx: GPC


Postoperative xray: Left 5th metatarsal bone changes compatible with both 

osteomyelitis recent biopsy to assess for the latter. Regional ulcer with 

bandaging. Inferred cellulitis here. Correlate clinically


Wound dressed with xeroform, ABD, DSD, ACE


No plan for further surgical intervention at this time


Podiatry will continue to follow while patient in house

## 2019-04-10 NOTE — CP.PCM.PN
Subjective





- Date & Time of Evaluation


Date of Evaluation: 04/10/19


Time of Evaluation: 08:00





- Subjective


Subjective: 





seen on rounds


IV rx renewed


cultures likely to show MRSA'


has large open wound right foot 


prognosis for limb salvage guarded 





Objective





- Vital Signs/Intake and Output


Vital Signs (last 24 hours): 


                                        











Temp Pulse Resp BP Pulse Ox


 


 98.5 F   74   20   160/83 H  97 


 


 04/10/19 16:31  04/10/19 16:31  04/10/19 16:31  04/10/19 16:31  04/10/19 16:31








Intake and Output: 


                                        











 04/10/19 04/11/19





 18:59 06:59


 


Intake Total 400 


 


Balance 400 














- Medications


Medications: 


                               Current Medications





Acetaminophen/Codeine Phosphate (Tylenol/Codeine 300 Mg/30 Mg)  1 ea PO Q4 PRN


   PRN Reason: Pain, Mild (1-3)


   Last Admin: 04/10/19 13:12 Dose:  1 ea


Amlodipine Besylate (Norvasc)  2.5 mg PO DAILY [facility]


   Last Admin: 04/10/19 14:40 Dose:  2.5 mg


Enalapril Maleate (Vasotec)  2.5 mg PO DAILY [facility]


   Last Admin: 04/10/19 09:52 Dose:  2.5 mg


Gabapentin (Neurontin)  100 mg PO QID [facility]


   Last Admin: 04/10/19 17:22 Dose:  100 mg


Cefepime HCl (Maxipime Iv 1 Gm Premix)  1 gm in 50 mls @ 100 mls/hr IVPB Q12H 

[facility]; Protocol


   Last Admin: 04/10/19 20:10 Dose:  100 mls/hr


Daptomycin 400 mg/ Sodium (Chloride)  100 mls @ 100 mls/hr IV Q24H [facility]; Protocol


   Stop: 04/13/19 14:01


   Last Admin: 04/10/19 14:27 Dose:  100 mls/hr


Lactated Ringer's (Lactated Ringer's)  1,000 mls @ 100 mls/hr IV .Q10H [facility]


Insulin Human Regular (Novolin R)  0 unit SC ACHS [facility]; Protocol


   Last Admin: 04/10/19 17:22 Dose:  3 units


Pantoprazole Sodium (Protonix Ec Tab)  40 mg PO DAILY [facility]


   Last Admin: 04/10/19 09:52 Dose:  40 mg


Sertraline HCl (Zoloft)  150 mg PO DAILY [facility]


   Last Admin: 04/10/19 09:52 Dose:  150 mg


Sodium Hypochlorite (Dakins Solution 0.125%)  0 appl TOP DAILY [facility]


   Last Admin: 04/10/19 09:53 Dose:  20 appl











- Labs


Labs: 


                                        





                                 04/10/19 06:31 





                                 04/10/19 06:31 





                                        











PT  12.1 SECONDS (9.7-12.2)   04/08/19  20:59    


 


INR  1.1   04/08/19  20:59    


 


APTT  30 SECONDS (21-34)   04/08/19  20:59    














- Constitutional


Appears: Non-toxic, Chronically Ill





- Head Exam


Head Exam: NORMOCEPHALIC





- Eye Exam


Eye Exam: absent: Scleral icterus





- ENT Exam


ENT Exam: Mucous Membranes Dry





- Neck Exam


Neck Exam: absent: Lymphadenopathy





- Respiratory Exam


Respiratory Exam: Decreased Breath Sounds, Clear to Ausculation Bilateral





- Cardiovascular Exam


Cardiovascular Exam: REGULAR RHYTHM, +S1, +S2





- GI/Abdominal Exam


GI & Abdominal Exam: Distended, Soft





- Rectal Exam


Rectal Exam: Deferred





-  Exam


 Exam: NORMAL INSPECTION





- Extremities Exam


Extremities Exam: absent: Pedal Edema


Additional comments: 





left BKA





RLE 


Vasc: DP/PT pulses fully palpable 2/4. Skin temperature warm to warm from 

proximal to distal WNL. CFT < 3 seconds to all digits. Minimal edema noted to 

periwound area


Neuro: Epicritic and protective sensation grossly diminished b/l


Derm: 3.5 cm x 2.3 cm x 0.4 cm ulceration noted to lateral aspect of right foot 

at level of fifth metatarsal. Wound base is fibrogranular in nature and fifth 

metatarsal bone is exposed. Minimal serous drainage noted. No purulence, 

fluctuance, tracking, tunneling or undermining appreciated. No malodor noted at 

this time. Hemmorhagic blister noted to dorsal aspect of third digit continues 

to improve.


MSK: Minimal pain on palpation to ulceration site. Shortened fifth digit 

secondary to previous partial fifth metatarsal amputation. L BKA appreciated








- Back Exam


Back Exam: absent: CVA tenderness (L), paraspinal tenderness





Assessment and Plan


(1) Osteomyelitis


Status: Acute   





(2) Cellulitis of right leg


Status: Acute   





(3) Chronic renal insufficiency


Status: Acute   





(4) Diabetic ulcer of foot associated with diabetes mellitus due to underlying c

ondition, limited to breakdown of skin


Status: Acute   





(5) Leg pain


Status: Acute   





(6) Musculoskeletal pain


Status: Acute   





- Assessment and Plan (Free Text)


Assessment: 





seen on rounds


IV rx renewed


cultures likely to show MRSA'


has large open wound right foot 


prognosis for limb salvage guarded

## 2019-04-10 NOTE — PN
DATE:  04/10/2019



SUBJECTIVE:  The patient has right foot culture positive for gram-positive

cocci, Enterococcus faecalis and methicillin-resistant Staph aureus.



PHYSICAL EXAMINATION:

VITAL SIGNS:  Blood pressure 163/78, temperature 98.1, respiratory rate 20

and pulse 83.

HEENT:  Pupils equal, reactive to light.  Pale mucosa of the conjunctiva.

NECK:  Supple.  No JVD, no carotid bruit.  No lymph nodes.  No thyromegaly.

CHEST AND LUNGS:  Bilateral symmetrical expansion.  Good air exchange.  No

rales, no rhonchi.

CARDIOVASCULAR:  PMI not localized.  S1, S2.  No additional sounds.

ABDOMEN:  Normoactive bowel sounds.  No tenderness, no organomegaly.  No

masses.

EXTREMITIES:  No cyanosis, no clubbing, no edema of the right foot and it

is surgically addressed.  There is left BKA.



ASSESSMENT:  Infected right foot diabetic foot, hypertension, type 2

diabetes mellitus with hyperglycemia, chronic kidney disease stage 3,

anemia of chronic disease.



PLAN:  Continue current medications, IV antibiotics as per ID and plan for

just short subacute rehabilitation for an extended course of IV antibiotics

after the debridement of the right foot wound.







__________________________________________

Liliane Brito MD





DD:  04/10/2019 13:32:37

DT:  04/10/2019 13:33:53

Job # [id number]

## 2019-04-11 NOTE — OP
PROCEDURE DATE:  04/09/2019



SURGEON:  Pacheco Maza DPM



ASSISTANTS:  Shonda Arce, PGY-1; Aguila Garza, PGY-2; Kathie Brown, MS-3



ANESTHESIOLOGIST:  Dr. Nguyen.



TYPE OF ANESTHESIA:  IV sedation with local anesthesia.



PREOPERATIVE DIAGNOSIS:  Right foot infected ulcer with osteomyelitis of

the right fifth metatarsal.



POSTOPERATIVE DIAGNOSIS:  Right foot infected ulcer with osteomyelitis of

the right fifth metatarsal.



PROCEDURES:  Debridement of nonhealing right foot ulcer and bone biopsy of

the right fifth metatarsal.



INDICATIONS:  The patient is a 53-year-old male with the above diagnosis. 

The patient has exhausted all conservative treatments at this time and now

requires surgical intervention.  The patient signed the consent after

careful explanation of risks, benefits, complications and alternatives for

the surgical procedures.  No guarantees were given nor implied.  NPO status

was confirmed prior to taking the patient to the OR.



PREPARATION:  The patient was brought into the operating room and placed on

the operating room table in a supine position.  A time-out was performed

for identification of the correct patient and procedure.  After induction

of IV sedation, the patient received a total of 10 mL of 1:1 mixture of

0.5% Marcaine plain and 2% lidocaine plain in a local block type fashion to

the right lateral aspect of the mid foot.  The right lower extremity was

then prepped and draped in normal sterile manner and the procedure began. 

No tourniquet was utilized during the procedure.



DESCRIPTION OF PROCEDURE:  Attention was directed to the lateral aspect of

the patient's right mid foot, where a nonhealing ulcer measuring

approximately 6 cm x 3 cm, deep to the bone was noted with a mixed fibrotic

and necrotic wound base.  Hyperkeratotic tissue was noted surrounding the

ulcer.  Using sterile #15 blade and pickups, hyperkeratotic tissue was

debrided from the border of the ulcer.  Next, using Versajet on setting 7,

the ulceration was excisionally debrided of all remaining fibrotic and

nonviable tissue until fresh and healthy bleeding granular tissue was

noted.  The ulcer was noted to be deep to bone.  At this time, using a

Rongeur, bone biopsy and bone cultures were taken and sent to Pathology. 

Deep tissue wound cultures were also taken at that time.  The ulcer was

then irrigated with copious amounts of normal sterile saline and the site

was packed using one-fourth inch iodoform packing.  The site was then

dressed with Xeroform, 4 x 4 gauze, ABD, Kerlix and Ace.



POSTOPERATIVE CONDITION:  The patient tolerated the anesthesia and

procedure well and was escorted to the recovery room with vital signs

stable and neurovascular status intact to the right leg.  The patient is to

remain in a surgical shoe to the right lower extremity at this time with

partial weightbearing.  Podiatry will continue to follow the patient while

in-house.  The patient will continue to receive IV antibiotics.  The

patient will follow up with Dr. Maza upon discharge.





__________________________________________

SHONDA ARCE





__________________________________________

Pacheco Maza DPM





DD:  04/10/2019 11:32:54

DT:  04/10/2019 22:20:46

Job # [id number]

GILBERT

## 2019-04-11 NOTE — PN
DATE:  04/11/2019



DAILY PROGRESS NOTE



SUBJECTIVE:  The patient is seen today, 04/11/2019.  He is not in any

cardiopulmonary distress.



PHYSICAL EXAMINATION:

VITAL SIGNS:  Blood pressure 154/65, temperature 97.9, respiratory rate 20,

and pulse 79.

HEENT:  Pale mucosa of the conjunctivae.

NECK:  Supple.  No JVD, no carotid bruit.  No lymph nodes.  No thyromegaly.

CHEST AND LUNGS:  Bilateral symmetrical expansion.  Good air exchange.  No

rales, no rhonchi.

CARDIOVASCULAR SYSTEM:  PMI not localized.  S1, S2.  No additional sounds.

ABDOMEN:  Normoactive bowel sounds.  No tenderness, no organomegaly.  No

masses.

EXTREMITIES:  Right foot is surgically dressed, and he has left BKA.

CENTRAL NERVOUS SYSTEM:  Alert, awake, oriented x3.  No neurological

deficit could be appreciated.



ASSESSMENT:

1.  Infected right foot.

2.  Peripheral vascular disease.

3.  Status post left below knee amputation.

4.  Type 2 diabetes mellitus with hyperglycemia.



PLAN:  Continue current IV antibiotics and continue Accu-Cheks with insulin

coverage, and we will start the patient on Lantus at 8 units 10 a.m. and 10

p.m.





__________________________________________

Liliane Brito MD





DD:  04/11/2019 16:52:31

DT:  04/11/2019 20:50:45

Job # [id number]

## 2019-04-12 NOTE — PN
DATE:  04/12/2019



SUBJECTIVE:  The patient is seen today, 04/12/2019.  He is on daptomycin

and the patient is tolerating.



PHYSICAL EXAMINATION:

VITAL SIGNS:  Blood pressure is 160/70, temperature 97.9, respiratory rate

20 and pulse 83.

HEENT:  Pupils equal, reactive to light.  Normal-appearing mucosa of the

conjunctivae, oropharynx and nasal membrane mucosa.

NECK:  Supple.  No JVD.  No carotid bruit.  No lymph node.  No thyromegaly.

CHEST AND LUNGS:  Bilateral symmetrical expansion.  Good air exchange.  No

rales, no rhonchi.

CARDIOVASCULAR SYSTEM:  PMI not localized.  S1, S2.  No additional sounds.

ABDOMEN:  Normoactive bowel sounds.  No tenderness.  No organomegaly.  No

masses.

EXTREMITIES:  The patient has left BKA and right foot is surgically

dressed.

CNS:  Alert, awake, oriented x2.  No neurological deficits could be

appreciated.



ASSESSMENT:

1.  Infected wound of the right foot, diabetic foot.

2.  Hypertension.

3.  Type 2 diabetes mellitus.

4.  Chronic kidney disease.



PLAN:  Continue IV antibiotics as per Infectious Disease and continue the

current medications.  The patient is for discharge to subacute

rehabilitation to continue the IV antibiotics as per recommendations of ID.





__________________________________________

[facility] MD Daryl





DD:  04/12/2019 16:03:32

DT:  04/12/2019 16:05:16

Job # [id number]

## 2019-04-12 NOTE — CP.PCM.PN
Subjective





- Date & Time of Evaluation


Date of Evaluation: 04/12/19


Time of Evaluation: 16:15





- Subjective


Subjective: 





Podiatry Progress Note for Dr. Maza





53M seen three day s/p debridement of lateral right foot wound. Patient is AAO x

3 and NAD, resting comfortably in bed. States that he has no pain at his wound 

site or at posterior heel. Denies any acute overnight events or new pedal 

complaints. Denies any recent N/V/F/C/CP/SOB?D





Objective





- Vital Signs/Intake and Output


Vital Signs (last 24 hours): 


                                        











Temp Pulse Resp BP Pulse Ox


 


 98.7 F   80   20   160/77 H  95 


 


 04/12/19 16:00  04/12/19 16:00  04/12/19 16:00  04/12/19 16:00  04/12/19 16:00








Intake and Output: 


                                        











 04/12/19 04/12/19





 06:59 18:59


 


Intake Total 550 


 


Output Total 1000 


 


Balance -450 














- Medications


Medications: 


                               Current Medications





Acetaminophen/Codeine Phosphate (Tylenol/Codeine 300 Mg/30 Mg)  1 ea PO Q4 PRN


   PRN Reason: Pain, Mild (1-3)


   Last Admin: 04/11/19 21:28 Dose:  1 ea


Amlodipine Besylate (Norvasc)  2.5 mg PO DAILY [facility]


   Last Admin: 04/12/19 09:35 Dose:  2.5 mg


Enalapril Maleate (Vasotec)  2.5 mg PO DAILY [facility]


   Last Admin: 04/12/19 09:35 Dose:  2.5 mg


Gabapentin (Neurontin)  100 mg PO QID [facility]


   Last Admin: 04/12/19 13:23 Dose:  100 mg


Heparin Sodium (Porcine) (Heparin)  5,000 units SC Q12 [facility]


   Last Admin: 04/12/19 09:36 Dose:  5,000 units


Cefepime HCl (Maxipime Iv 1 Gm Premix)  1 gm in 50 mls @ 100 mls/hr IVPB Q12H 

[facility]; Protocol


   Last Admin: 04/12/19 07:45 Dose:  100 mls/hr


Daptomycin 400 mg/ Sodium (Chloride)  100 mls @ 100 mls/hr IV Q24H [facility]; Protocol


   Stop: 04/13/19 14:01


   Last Admin: 04/12/19 13:23 Dose:  100 mls/hr


Insulin Glargine (Lantus)  8 unit SC HS [facility]


   Last Admin: 04/11/19 22:05 Dose:  8 units


Insulin Human Regular (Novolin R)  0 unit SC ACHS [facility]; Protocol


   Last Admin: 04/12/19 11:26 Dose:  2 units


Pantoprazole Sodium (Protonix Ec Tab)  40 mg PO DAILY [facility]


   Last Admin: 04/12/19 09:36 Dose:  40 mg


Sertraline HCl (Zoloft)  150 mg PO DAILY [facility]


   Last Admin: 04/12/19 09:36 Dose:  150 mg


Sodium Hypochlorite (Dakins Solution 0.125%)  0 appl TOP DAILY [facility]


   Last Admin: 04/12/19 09:35 Dose:  Not Given











- Labs


Labs: 


                                        





                                 04/10/19 06:31 





                                 04/10/19 06:31 





                                        











PT  12.1 SECONDS (9.7-12.2)   04/08/19  20:59    


 


INR  1.1   04/08/19  20:59    


 


APTT  30 SECONDS (21-34)   04/08/19  20:59    














- Constitutional


Appears: Well, Non-toxic, No Acute Distress





- Extremities Exam


Additional comments: 





RLE focused exam:





Vasc: DP/PT pulses fully palpable 2/4. Skin temperature warm to warm from 

proximal to distal WNL. CFT < 3 seconds to all digits. Minimal edema noted to 

periwound area


Neuro: Epicritic and protective sensation grossly diminished b/l


Derm: 3.5 cm x 2.3 cm x 0.4 cm ulceration noted to lateral aspect of right foot 

at level of fifth metatarsal. Wound base is fibrogranular in nature and fifth 

metatarsal bone is exposed. Increased granulation tissue appreciated today. 

Minimal serous drainage noted. No purulence, fluctuance, tracking, tunneling or 

undermining appreciated. No malodor noted at this time. Hemmorhagic blister 

noted to dorsal aspect of third digit continues to improve.


MSK: Minimal pain on palpation to ulceration site. Shortened fifth digit 

secondary to previous partial fifth metatarsal amputation. L BKA appreciated





- Neurological Exam


Neurological Exam: Alert, Awake, Oriented x3





- Psychiatric Exam


Psychiatric exam: Normal Affect, Normal Mood





Assessment and Plan





- Assessment and Plan (Free Text)


Assessment: 





53M seen three day s/p debridement of lateral right foot wound.


Plan: 





Patient seen and evaluated with Dr. Maza


Afebrile


Continue abx per ID


Intra-operative wound cx: MRSA, E Faecalis


Postoperative xray: Left 5th metatarsal bone changes compatible with both 

osteomyelitis recent biopsy to assess for the latter. Regional ulcer with 

bandaging. Inferred cellulitis here. Correlate clinically


Wound dressed with xeroform, ABD, DSD, ACE


No plan for further surgical intervention at this time


Podiatry will continue to follow while patient in house


Upon discharge patient will need wound vac placement at subacute rehab facility


Patient to follow up with Dr. Maza upon discharge

## 2019-04-12 NOTE — CP.PCM.PN
Subjective





- Date & Time of Evaluation


Date of Evaluation: 04/12/19


Time of Evaluation: 09:00





- Subjective


Subjective: 





s/p debridement right foot OM  day 3


c/s + MRSA


on cubicin


has CKD 





Objective





- Vital Signs/Intake and Output


Vital Signs (last 24 hours): 


                                        











Temp Pulse Resp BP Pulse Ox


 


 98.7 F   80   20   160/77 H  95 


 


 04/12/19 16:00  04/12/19 16:00  04/12/19 16:00  04/12/19 16:00  04/12/19 16:00








Intake and Output: 


                                        











 04/12/19 04/12/19





 06:59 18:59


 


Intake Total 550 


 


Output Total 1000 


 


Balance -450 














- Medications


Medications: 


                               Current Medications





Acetaminophen/Codeine Phosphate (Tylenol/Codeine 300 Mg/30 Mg)  1 ea PO Q4 PRN


   PRN Reason: Pain, Mild (1-3)


   Last Admin: 04/11/19 21:28 Dose:  1 ea


Amlodipine Besylate (Norvasc)  2.5 mg PO DAILY [facility]


   Last Admin: 04/12/19 09:35 Dose:  2.5 mg


Enalapril Maleate (Vasotec)  2.5 mg PO DAILY [facility]


   Last Admin: 04/12/19 09:35 Dose:  2.5 mg


Gabapentin (Neurontin)  100 mg PO QID [facility]


   Last Admin: 04/12/19 13:23 Dose:  100 mg


Heparin Sodium (Porcine) (Heparin)  5,000 units SC Q12 [facility]


   Last Admin: 04/12/19 09:36 Dose:  5,000 units


Cefepime HCl (Maxipime Iv 1 Gm Premix)  1 gm in 50 mls @ 100 mls/hr IVPB Q12H 

[facility]; Protocol


   Last Admin: 04/12/19 07:45 Dose:  100 mls/hr


Daptomycin 400 mg/ Sodium (Chloride)  100 mls @ 100 mls/hr IV Q24H [facility]; Protocol


   Stop: 04/13/19 14:01


   Last Admin: 04/12/19 13:23 Dose:  100 mls/hr


Insulin Glargine (Lantus)  8 unit SC HS [facility]


   Last Admin: 04/11/19 22:05 Dose:  8 units


Insulin Human Regular (Novolin R)  0 unit SC ACHS [facility]; Protocol


   Last Admin: 04/12/19 11:26 Dose:  2 units


Pantoprazole Sodium (Protonix Ec Tab)  40 mg PO DAILY [facility]


   Last Admin: 04/12/19 09:36 Dose:  40 mg


Sertraline HCl (Zoloft)  150 mg PO DAILY [facility]


   Last Admin: 04/12/19 09:36 Dose:  150 mg


Sodium Hypochlorite (Dakins Solution 0.125%)  0 appl TOP DAILY [facility]


   Last Admin: 04/12/19 09:35 Dose:  Not Given











- Labs


Labs: 


                                        





                                 04/10/19 06:31 





                                 04/10/19 06:31 





                                        











PT  12.1 SECONDS (9.7-12.2)   04/08/19  20:59    


 


INR  1.1   04/08/19  20:59    


 


APTT  30 SECONDS (21-34)   04/08/19  20:59    














- Constitutional


Appears: Non-toxic, Chronically Ill





- Head Exam


Head Exam: NORMOCEPHALIC





- Eye Exam


Eye Exam: absent: Scleral icterus





- ENT Exam


ENT Exam: Mucous Membranes Dry





- Neck Exam


Neck Exam: absent: Lymphadenopathy





- Respiratory Exam


Respiratory Exam: Decreased Breath Sounds, Prolonged Expiratory Phase, Rhonchi





- Cardiovascular Exam


Cardiovascular Exam: REGULAR RHYTHM, +S1, +S2





- GI/Abdominal Exam


GI & Abdominal Exam: Distended, Soft.  absent: Tenderness





- Rectal Exam


Rectal Exam: Deferred





-  Exam


 Exam: NORMAL INSPECTION





- Extremities Exam


Extremities Exam: absent: Pedal Edema





- Back Exam


Back Exam: absent: CVA tenderness (L), CVA tenderness (R)





- Neurological Exam


Neurological Exam: Alert, Awake, CN II-XII Intact





- Psychiatric Exam


Psychiatric exam: Depressed





- Skin


Skin Exam: Dry, Intact


Additional comments: 





right foot wound C/D?I


left BKA





Assessment and Plan


(1) Osteomyelitis


Status: Acute   





(2) Cellulitis of right leg


Status: Acute   





(3) Chronic renal insufficiency


Status: Acute   





(4) Diabetic ulcer of foot associated with diabetes mellitus due to underlying 

condition, limited to breakdown of skin


Status: Acute   





(5) Leg pain


Status: Acute   





(6) Musculoskeletal pain


Status: Acute   





- Assessment and Plan (Free Text)


Assessment: 





s/p debridement right foot OM  day 3


c/s + MRSA


on cubicin


has CKD

## 2019-04-13 NOTE — CP.PCM.PN
Subjective





- Date & Time of Evaluation


Date of Evaluation: 04/13/19


Time of Evaluation: 18:58





- Subjective


Subjective: 





Podiatry Progress Note for Dr. Maza





53M seen four day s/p debridement of lateral right foot wound. Patient is AAO x 

3 and NAD, resting comfortably in bed. States that he has no pain at his wound 

site or at posterior heel. Denies any acute overnight events or new pedal 

complaints. Denies any recent N/V/F/C/CP/SOB?D








Objective





- Vital Signs/Intake and Output


Vital Signs (last 24 hours): 


                                        











Temp Pulse Resp BP Pulse Ox


 


 97.7 F   77   20   160/82 H  94 L


 


 04/13/19 15:00  04/13/19 15:00  04/13/19 15:00  04/13/19 15:00  04/13/19 15:00











- Medications


Medications: 


                               Current Medications





Acetaminophen/Codeine Phosphate (Tylenol/Codeine 300 Mg/30 Mg)  1 ea PO Q4 PRN


   PRN Reason: Pain, Mild (1-3)


   Last Admin: 04/12/19 21:43 Dose:  1 ea


Amlodipine Besylate (Norvasc)  2.5 mg PO DAILY [facility]


   Last Admin: 04/13/19 09:34 Dose:  2.5 mg


Enalapril Maleate (Vasotec)  2.5 mg PO DAILY [facility]


   Last Admin: 04/13/19 09:34 Dose:  2.5 mg


Epoetin David (Procrit)  10,000 unit SC TTS [facility]


Gabapentin (Neurontin)  100 mg PO QID [facility]


   Last Admin: 04/13/19 17:11 Dose:  100 mg


Heparin Sodium (Porcine) (Heparin)  5,000 units SC Q12 [facility]


   Last Admin: 04/13/19 09:34 Dose:  5,000 units


Cefepime HCl (Maxipime Iv 1 Gm Premix)  1 gm in 50 mls @ 100 mls/hr IVPB Q12H 

[facility]; Protocol


   Last Admin: 04/13/19 11:20 Dose:  100 mls/hr


Insulin Glargine (Lantus)  8 unit SC HS [facility]


   Last Admin: 04/12/19 21:44 Dose:  8 units


Insulin Human Regular (Novolin R)  0 unit SC ACHS [facility]; Protocol


   Last Admin: 04/13/19 17:14 Dose:  6 units


Pantoprazole Sodium (Protonix Ec Tab)  40 mg PO DAILY [facility]


   Last Admin: 04/13/19 09:34 Dose:  40 mg


Sertraline HCl (Zoloft)  150 mg PO DAILY [facility]


   Last Admin: 04/13/19 09:34 Dose:  150 mg


Sodium Hypochlorite (Dakins Solution 0.125%)  0 appl TOP DAILY [facility]


   Last Admin: 04/13/19 09:39 Dose:  Not Given











- Labs


Labs: 


                                        





                                 04/13/19 06:28 





                                 04/13/19 06:28 





                                        











PT  12.1 SECONDS (9.7-12.2)   04/08/19  20:59    


 


INR  1.1   04/08/19  20:59    


 


APTT  30 SECONDS (21-34)   04/08/19  20:59    














- Constitutional


Appears: Well, Non-toxic, No Acute Distress





- Head Exam


Head Exam: ATRAUMATIC, NORMOCEPHALIC





- Eye Exam


Eye Exam: Normal appearance





- Cardiovascular Exam


Cardiovascular Exam: REGULAR RHYTHM





- Extremities Exam


Additional comments: 


dressing d /c/i








RLE focused exam:





Vasc: DP/PT pulses fully palpable 2/4. Skin temperature warm to warm from 

proximal to distal WNL. CFT < 3 seconds to all digits. Minimal edema noted to 

periwound area


Neuro: Epicritic and protective sensation grossly diminished b/l


Derm: 3.5 cm x 2.3 cm x 0.4 cm ulceration noted to lateral aspect of right foot 

at level of fifth metatarsal. Wound base is fibrogranular in nature and fifth 

metatarsal bone is exposed. Increased granulation tissue appreciated today. 

Minimal serous drainage noted. No purulence, fluctuance, tracking, tunneling or 

undermining appreciated. No malodor noted at this time. Hemmorhagic blister 

noted to dorsal aspect of third digit continues to improve.


MSK: Minimal pain on palpation to ulceration site. Shortened fifth digit 

secondary to previous partial fifth metatarsal amputation. L BKA appreciate








Assessment and Plan





- Assessment and Plan (Free Text)


Assessment: 


53M seen four day s/p debridement of lateral right foot wound. 





Plan: 


Patient seen and evaluated with Dr. Shaunna Martin


Continue abx per ID


Intra-operative wound cx: MRSA, E Faecalis


Postoperative xray: Left 5th metatarsal bone changes compatible with both 

osteomyelitis recent biopsy to assess for the latter. Regional ulcer with 

bandaging. Inferred cellulitis here. Correlate clinically


Wound dressed with xeroform, ABD, DSD, ACE


No plan for further surgical intervention at this time


Podiatry will continue to follow while patient in house


Stable for discharge from podiatry point of view


Upon discharge patient will need wound vac placement at subacute rehab facility


Patient to follow up with Dr. Maza upon discharge

## 2019-04-14 NOTE — CP.PCM.PN
Subjective





- Date & Time of Evaluation


Date of Evaluation: 04/14/19


Time of Evaluation: 08:00





- Subjective


Subjective: 





seen on rounds with Dr Urena


IV rx reordered


wound healing nicely 


awaiting transfer to [facility]





Objective





- Vital Signs/Intake and Output


Vital Signs (last 24 hours): 


                                        











Temp Pulse Resp BP Pulse Ox


 


 98.0 F   79   20   149/74   96 


 


 04/14/19 15:51  04/14/19 15:51  04/14/19 15:51  04/14/19 15:51  04/14/19 15:51








Intake and Output: 


                                        











 04/14/19 04/14/19





 06:59 18:59


 


Intake Total  550


 


Output Total  1200


 


Balance  -650














- Medications


Medications: 


                               Current Medications





Acetaminophen/Codeine Phosphate (Tylenol/Codeine 300 Mg/30 Mg)  1 ea PO Q4 PRN


   PRN Reason: Pain, Mild (1-3)


   Last Admin: 04/12/19 21:43 Dose:  1 ea


Amlodipine Besylate (Norvasc)  2.5 mg PO DAILY [facility]


   Last Admin: 04/14/19 09:05 Dose:  2.5 mg


Enalapril Maleate (Vasotec)  2.5 mg PO DAILY [facility]


   Last Admin: 04/14/19 09:05 Dose:  2.5 mg


Epoetin David (Procrit)  10,000 unit SC TTS [facility]


Gabapentin (Neurontin)  100 mg PO QID [facility]


   Last Admin: 04/14/19 13:03 Dose:  100 mg


Heparin Sodium (Porcine) (Heparin)  5,000 units SC Q12 [facility]


   Last Admin: 04/14/19 09:05 Dose:  5,000 units


Cefepime HCl (Maxipime Iv 1 Gm Premix)  1 gm in 50 mls @ 100 mls/hr IVPB Q12H 

[facility]; Protocol


   Last Admin: 04/14/19 08:54 Dose:  100 mls/hr


Insulin Glargine (Lantus)  8 unit SC HS [facility]


   Last Admin: 04/13/19 21:18 Dose:  8 units


Insulin Human Regular (Novolin R)  0 unit SC ACHS [facility]; Protocol


   Last Admin: 04/14/19 12:16 Dose:  2 units


Pantoprazole Sodium (Protonix Ec Tab)  40 mg PO DAILY [facility]


   Last Admin: 04/14/19 09:06 Dose:  40 mg


Sertraline HCl (Zoloft)  150 mg PO DAILY [facility]


   Last Admin: 04/14/19 09:06 Dose:  150 mg


Sodium Hypochlorite (Dakins Solution 0.125%)  0 appl TOP DAILY [facility]


   Last Admin: 04/14/19 09:05 Dose:  Not Given











- Labs


Labs: 


                                        





                                 04/13/19 06:28 





                                 04/13/19 06:28 





                                        











PT  12.1 SECONDS (9.7-12.2)   04/08/19  20:59    


 


INR  1.1   04/08/19  20:59    


 


APTT  30 SECONDS (21-34)   04/08/19  20:59    














- Constitutional


Appears: Non-toxic, Chronically Ill





- Head Exam


Head Exam: ATRAUMATIC, NORMAL INSPECTION, NORMOCEPHALIC





- Eye Exam


Eye Exam: EOMI, Normal appearance, PERRL


Pupil Exam: NORMAL ACCOMODATION, PERRL





- ENT Exam


ENT Exam: Mucous Membranes Moist, Normal Exam





- Neck Exam


Neck Exam: Full ROM, Normal Inspection.  absent: Lymphadenopathy





- Respiratory Exam


Respiratory Exam: Clear to Ausculation Bilateral, NORMAL BREATHING PATTERN





- Cardiovascular Exam


Cardiovascular Exam: REGULAR RHYTHM, +S1, +S2.  absent: Murmur





- GI/Abdominal Exam


GI & Abdominal Exam: Soft, Normal Bowel Sounds.  absent: Tenderness





- Rectal Exam


Rectal Exam: Deferred





-  Exam


 Exam: NORMAL INSPECTION





- Extremities Exam


Extremities Exam: Full ROM, Joint Swelling, Normal Capillary Refill, Pedal 

Edema.  absent: Normal Inspection


Additional comments: 





right foot deformity 


wound on lateral right foot slowly healing  no pus  + granulation tissue 





- Back Exam


Back Exam: NORMAL INSPECTION





- Neurological Exam


Neurological Exam: Alert, Awake, CN II-XII Intact, Normal Gait, Oriented x3





- Psychiatric Exam


Psychiatric exam: Normal Affect, Normal Mood





- Skin


Skin Exam: Dry, Intact, Normal Color, Warm





Assessment and Plan


(1) Osteomyelitis


Status: Acute   





(2) Cellulitis of right leg


Status: Acute   





(3) Chronic renal insufficiency


Status: Acute   





(4) Diabetic ulcer of foot associated with diabetes mellitus due to underlying 

condition, limited to breakdown of skin


Status: Acute   





(5) Leg pain


Status: Acute   





(6) Musculoskeletal pain


Status: Acute   





- Assessment and Plan (Free Text)


Assessment: 





cont Rx for OM x 6 weeks more

## 2019-04-14 NOTE — CP.PCM.PN
Subjective





- Date & Time of Evaluation


Date of Evaluation: 04/14/19


Time of Evaluation: 15:21





- Subjective


Subjective: 


Podiatry Progress Note for Dr. Maza





53M seen five day s/p debridement of lateral right foot wound. Patient is AAO x 

3 and NAD, resting comfortably in bed. States that he has no pain at his wound 

site or at posterior heel. Denies any acute overnight events or new pedal 

complaints. Denies any recent N/V/F/C/CP/SOB?D








Objective





- Vital Signs/Intake and Output


Vital Signs (last 24 hours): 


                                        











Temp Pulse Resp BP Pulse Ox


 


 98.3 F   79   20   175/87 H  97 


 


 04/14/19 08:00  04/14/19 08:00  04/14/19 08:00  04/14/19 09:05  04/14/19 08:00








Intake and Output: 


                                        











 04/14/19 04/14/19





 06:59 18:59


 


Intake Total  550


 


Output Total  1200


 


Balance  -650














- Medications


Medications: 


                               Current Medications





Acetaminophen/Codeine Phosphate (Tylenol/Codeine 300 Mg/30 Mg)  1 ea PO Q4 PRN


   PRN Reason: Pain, Mild (1-3)


   Last Admin: 04/12/19 21:43 Dose:  1 ea


Amlodipine Besylate (Norvasc)  2.5 mg PO DAILY [facility]


   Last Admin: 04/14/19 09:05 Dose:  2.5 mg


Enalapril Maleate (Vasotec)  2.5 mg PO DAILY [facility]


   Last Admin: 04/14/19 09:05 Dose:  2.5 mg


Epoetin David (Procrit)  10,000 unit SC TTS [facility]


Gabapentin (Neurontin)  100 mg PO QID [facility]


   Last Admin: 04/14/19 13:03 Dose:  100 mg


Heparin Sodium (Porcine) (Heparin)  5,000 units SC Q12 [facility]


   Last Admin: 04/14/19 09:05 Dose:  5,000 units


Cefepime HCl (Maxipime Iv 1 Gm Premix)  1 gm in 50 mls @ 100 mls/hr IVPB Q12H 

[facility]; Protocol


   Last Admin: 04/14/19 08:54 Dose:  100 mls/hr


Insulin Glargine (Lantus)  8 unit SC HS [facility]


   Last Admin: 04/13/19 21:18 Dose:  8 units


Insulin Human Regular (Novolin R)  0 unit SC ACHS [facility]; Protocol


   Last Admin: 04/14/19 12:16 Dose:  2 units


Pantoprazole Sodium (Protonix Ec Tab)  40 mg PO DAILY [facility]


   Last Admin: 04/14/19 09:06 Dose:  40 mg


Sertraline HCl (Zoloft)  150 mg PO DAILY [facility]


   Last Admin: 04/14/19 09:06 Dose:  150 mg


Sodium Hypochlorite (Dakins Solution 0.125%)  0 appl TOP DAILY [facility]


   Last Admin: 04/14/19 09:05 Dose:  Not Given











- Labs


Labs: 


                                        





                                 04/13/19 06:28 





                                 04/13/19 06:28 





                                        











PT  12.1 SECONDS (9.7-12.2)   04/08/19  20:59    


 


INR  1.1   04/08/19  20:59    


 


APTT  30 SECONDS (21-34)   04/08/19  20:59    














- Extremities Exam


Additional comments: 





dressing d /c/i


RLE focused exam:





Vasc: DP/PT pulses fully palpable 2/4. Skin temperature warm to warm from 

proximal to distal WNL. CFT < 3 seconds to all digits. Minimal edema noted to 

periwound area


Neuro: Epicritic and protective sensation grossly diminished b/l


Derm: 3.5 cm x 2.3 cm x 0.4 cm ulceration noted to lateral aspect of right foot 

at level of fifth metatarsal. Wound base is fibrogranular in nature and fifth 

metatarsal bone is exposed. Increased granulation tissue appreciated today. 

Minimal serous drainage noted. No purulence, fluctuance, tracking, tunneling or 

undermining appreciated. No malodor noted at this time. 


MSK: Minimal pain on palpation to ulceration site. Shortened fifth digit 

secondary to previous partial fifth metatarsal amputation. L BKA appreciate











- Neurological Exam


Neurological Exam: Alert, Awake





Assessment and Plan





- Assessment and Plan (Free Text)


Assessment: 





53M seen five day s/p debridement of lateral right foot wound. 





Plan: 


Patient seen and evaluated with Dr. Maza


Afebrile


Continue abx per ID


Intra-operative wound cx: MRSA, E Faecalis


Postoperative xray: Left 5th metatarsal bone changes compatible with both 

osteomyelitis recent biopsy to assess for the latter. Regional ulcer with 

bandaging. Inferred cellulitis here. Correlate clinically


Wound dressed with xeroform, ABD, DSD, ACE


No plan for further surgical intervention at this time


Podiatry will continue to follow while patient in house


Stable for discharge from podiatry point of view


Upon discharge patient will need wound vac placement at subacute rehab facility


Patient to follow up with Dr. Maza upon discharge

## 2019-04-14 NOTE — PN
DATE:  04/13/2019



DAILY PROGRESS NOTE



SUBJECTIVE:  The patient is seen on 04/13/2019.  He is afebrile and he is

not in any cardiopulmonary distress.



PHYSICAL EXAMINATION:

VITAL SIGNS:  Blood pressure 130/70, temperature 98.4, respiratory rate 18,

and pulse 72.

HEENT:  Pupils equal, reactive to light.  Slightly pale mucosa of the

conjunctivae.

NECK:  Supple.  No JVD.  No carotid bruits.  No lymph node.  No

thyromegaly.

CHEST AND LUNGS:  Bilateral symmetrical expansion.  Good air exchange.  No

rales.  No rhonchi.

CARDIOVASCULAR SYSTEM:  PMI not localized.  S1, S2.  No additional sounds.

ABDOMEN:  Normoactive bowel sounds.  No tenderness.  No organomegaly.  No

masses.

EXTREMITIES:  Left BKA and right foot is surgically dressed.

CENTRAL NERVOUS SYSTEM:  Alert, awake, oriented x2 and moves all

extremities equally.



ASSESSMENT:

1.  Diabetic foot.

2.  Infected right foot wound and does not respond to outpatient

antibiotics and local wound Podiatry treatment.

3.  Type 2 diabetes mellitus.

4.  Hypertension.

5.  Coronary artery disease.

6.  Chronic kidney disease.

7.  Anemia of chronic disease status post packed red blood cells

transfusion.



PLAN:  Continue current IV antibiotics and follow recommendations of

Infectious Disease as well as Podiatry.





__________________________________________

Liliane Brito MD





DD:  04/14/2019 17:25:58

DT:  04/14/2019 20:00:56

Job # [id number]

## 2019-04-15 NOTE — CP.PCM.PN
<Roberto Walter - Last Filed: 04/15/19 15:33>





Subjective





- Date & Time of Evaluation


Date of Evaluation: 04/15/19


Time of Evaluation: 15:34





- Subjective


Subjective: 





HOSPITALIST SERVICE- COVERAGE FOR DR WILLSON





Pt s/e at bedside, reports a headache, resolved now from tylenol, pt would like 

to go to [facility], agrees with plan. denies CP SOB FC [facility]





Objective





- Vital Signs/Intake and Output


Vital Signs (last 24 hours): 


                                        











Temp Pulse Resp BP Pulse Ox


 


 97.9 F   79   20   155/82 H  98 


 


 04/15/19 08:14  04/15/19 08:14  04/15/19 08:14  04/15/19 10:37  04/15/19 08:14








Intake and Output: 


                                        











 04/15/19 04/15/19





 06:59 18:59


 


Intake Total  530


 


Balance  530














- Medications


Medications: 


                               Current Medications





Acetaminophen/Codeine Phosphate (Tylenol/Codeine 300 Mg/30 Mg)  1 ea PO Q4 PRN


   PRN Reason: Pain, Mild (1-3)


   Last Admin: 04/14/19 22:28 Dose:  1 ea


Amlodipine Besylate (Norvasc)  2.5 mg PO DAILY [facility]


   Last Admin: 04/15/19 10:38 Dose:  2.5 mg


Enalapril Maleate (Vasotec)  2.5 mg PO DAILY [facility]


   Last Admin: 04/15/19 10:37 Dose:  2.5 mg


Epoetin David (Procrit)  10,000 unit SC TTS [facility]


Gabapentin (Neurontin)  100 mg PO QID [facility]


   Last Admin: 04/15/19 14:34 Dose:  100 mg


Heparin Sodium (Porcine) (Heparin)  5,000 units SC Q12 [facility]


   Last Admin: 04/15/19 10:38 Dose:  5,000 units


Cefepime HCl (Maxipime Iv 1 Gm Premix)  1 gm in 50 mls @ 100 mls/hr IVPB Q12H 

[facility]; Protocol


   Last Admin: 04/15/19 08:23 Dose:  100 mls/hr


Daptomycin 400 mg/ Sodium (Chloride)  100 mls @ 100 mls/hr IV Q24H [facility]; Protocol


Insulin Glargine (Lantus)  8 unit SC HS [facility]


   Last Admin: 04/14/19 21:06 Dose:  8 units


Insulin Human Regular (Novolin R)  0 unit SC ACHS [facility]; Protocol


   Last Admin: 04/15/19 12:30 Dose:  4 units


Pantoprazole Sodium (Protonix Ec Tab)  40 mg PO DAILY [facility]


   Last Admin: 04/15/19 10:37 Dose:  40 mg


Saccharomyces Boulardii (Florastor)  250 mg PO BID [facility]


   Last Admin: 04/15/19 10:38 Dose:  250 mg


Sertraline HCl (Zoloft)  150 mg PO DAILY [facility]


   Last Admin: 04/15/19 10:37 Dose:  150 mg


Sodium Hypochlorite (Dakins Solution 0.125%)  0 appl TOP DAILY [facility]


   Last Admin: 04/15/19 10:38 Dose:  Not Given











- Labs


Labs: 


                                        





                                 04/15/19 06:29 





                                 04/15/19 06:29 





                                        











PT  12.1 SECONDS (9.7-12.2)   04/08/19  20:59    


 


INR  1.1   04/08/19  20:59    


 


APTT  30 SECONDS (21-34)   04/08/19  20:59    














- Constitutional


Appears: Non-toxic, No Acute Distress





- Head Exam


Head Exam: ATRAUMATIC, NORMOCEPHALIC





- Eye Exam


Eye Exam: EOMI, Normal appearance.  absent: Scleral icterus


Pupil Exam: NORMAL ACCOMODATION, PERRL





- ENT Exam


ENT Exam: Mucous Membranes Moist, Normal Exam





- Neck Exam


Neck Exam: Normal Inspection.  absent: Lymphadenopathy





- Respiratory Exam


Respiratory Exam: Clear to Ausculation Bilateral.  absent: Rales, Rhonchi, 

Wheezes





- Cardiovascular Exam


Cardiovascular Exam: RRR, +S1, +S2





- GI/Abdominal Exam


GI & Abdominal Exam: Soft.  absent: Firm, Tenderness, Rebound





- Extremities Exam


Additional comments: 





R Lat Foot: wound appears non erythematous, no drainage or bleeding noted, 

wrapped in wound dressings. sensations absent below distal shin to toes. Pt able

to move toes 





Pedal and Tibial Pulses present





- Neurological Exam


Neurological Exam: Alert, Awake, CN II-XII Intact, Oriented x3





- Psychiatric Exam


Psychiatric exam: Normal Affect, Normal Mood





- Skin


Skin Exam: Dry, Normal Color, Warm





Assessment and Plan





- Assessment and Plan (Free Text)


Assessment: 





53M PMHx of Anemia, Depression, and Type II Diabetes, Chronic Osteomyelitis, 

Left BKA admitted for cellulitis of R foot 





Plan: 





Right Foot Cellulitis/Osteomyelitis


-Wound Culture: Enterococcus and MRSA


-ID Dr Trinidad Consulted:


   Dapto 400mg IV daily (started 4/8)


   Cefepime 1g IVPB q12 (started 4/4)


   continue for 20 days at [facility], then outpatient infusion for 14 more days (to be

completed May 20)


-Podiatry Dr Maza Consulted:


   POD# 6 Debridement - No plan for further surgical intervention at this time


   Stable for discharge from podiatry point of view


   Upon discharge patient will need wound vac placement at subacute rehab 

facility


-Afebrile, WBCs wnl





DM


-Glargine 8u sc HS


-ISS medium


-Enalapril 2.5 daily


-Gabapentin 100 PO QID


-maintain euglycemia





Chronic Renal Insufficiency


-Procrit TTS


-Cr 2.0: holding nephrotoxic meds, using dapto instead of vanco


-Monitor I&Os





Depression


-Sertraline 150 daily


-monitor 





HTN


-Norvasc 2.5 daily


-monitor


-Dr Mendez Cardio Consulted:


   Stress test: Normal


   ECHO: Normal EF


   Low risk for cardiac events for foot surgery under general anaesthesia or 

Conscious sedation





PPx


-Heparin 5000u q12


-HHD


-PTX 40 daily





dispo:  Pt pending [facility] placement likely [place]. Plan for 20 days of dapto @ 

[facility] then 14 days of outpt daily infusion





[facility] PGY1


d/w Dr Eduardo





<Raymond Eduardo  - Last Filed: 04/15/19 17:53>





Objective





- Vital Signs/Intake and Output


Vital Signs (last 24 hours): 


                                        











Temp Pulse Resp BP Pulse Ox


 


 98.1 F   79   20   164/74 H  96 


 


 04/15/19 16:13  04/15/19 16:13  04/15/19 16:13  04/15/19 16:13  04/15/19 16:13








Intake and Output: 


                                        











 04/15/19 04/15/19





 06:59 18:59


 


Intake Total  530


 


Balance  530














- Medications


Medications: 


                               Current Medications





Amlodipine Besylate (Norvasc)  2.5 mg PO DAILY [facility]


   Last Admin: 04/15/19 10:38 Dose:  2.5 mg


Enalapril Maleate (Vasotec)  2.5 mg PO DAILY [facility]


   Last Admin: 04/15/19 10:37 Dose:  2.5 mg


Epoetin David (Procrit)  10,000 unit SC TTS [facility]


Gabapentin (Neurontin)  100 mg PO QID [facility]


   Last Admin: 04/15/19 17:29 Dose:  100 mg


Heparin Sodium (Porcine) (Heparin)  5,000 units SC Q12 [facility]


   Last Admin: 04/15/19 10:38 Dose:  5,000 units


Cefepime HCl (Maxipime Iv 1 Gm Premix)  1 gm in 50 mls @ 100 mls/hr IVPB Q12H 

[facility]; Protocol


   Last Admin: 04/15/19 08:23 Dose:  100 mls/hr


Daptomycin 400 mg/ Sodium (Chloride)  100 mls @ 100 mls/hr IV Q24H [facility]; Protocol


   Last Admin: 04/15/19 17:30 Dose:  100 mls/hr


Insulin Glargine (Lantus)  8 unit SC HS [facility]


   Last Admin: 04/14/19 21:06 Dose:  8 units


Insulin Human Regular (Novolin R)  0 unit SC ACHS [facility]; Protocol


   Last Admin: 04/15/19 17:30 Dose:  3 units


Pantoprazole Sodium (Protonix Ec Tab)  40 mg PO DAILY [facility]


   Last Admin: 04/15/19 10:37 Dose:  40 mg


Saccharomyces Boulardii (Florastor)  250 mg PO BID [facility]


   Last Admin: 04/15/19 17:47 Dose:  250 mg


Sertraline HCl (Zoloft)  150 mg PO DAILY [facility]


   Last Admin: 04/15/19 10:37 Dose:  150 mg


Sodium Hypochlorite (Dakins Solution 0.125%)  0 appl TOP DAILY [facility]


   Last Admin: 04/15/19 10:38 Dose:  Not Given











- Labs


Labs: 


                                        





                                 04/15/19 06:29 





                                 04/15/19 06:29 





                                        











PT  12.1 SECONDS (9.7-12.2)   04/08/19  20:59    


 


INR  1.1   04/08/19  20:59    


 


APTT  30 SECONDS (21-34)   04/08/19  20:59    














Attending/Attestation





- Attestation


I have personally seen and examined this patient.: Yes


I have fully participated in the care of the patient.: Yes


I have reviewed all pertinent clinical information, including history, physical 

exam and plan: Yes


Notes (Text): 





04/15/19 17:52


Medical attending: Hospitalist group covering for patient's primary physician 

today. Reviewed the above note by the medical resident and agree with the above.


Patient was not in any acute distress this morning. He reports ongoing minimal 

parathesia in the right lower extremity. The lower extremity is covered with 

dressing at this time. He currently remains on the IV abx, pending ALYX at this 

moment. 





Raymond Eduardo

## 2019-04-16 NOTE — CP.PCM.PN
Subjective





- Date & Time of Evaluation


Date of Evaluation: 04/16/19


Time of Evaluation: 09:00





- Subjective


Subjective: 





53 year old M seen   7 days s/p debridement of lateral right foot wound.





Objective





- Vital Signs/Intake and Output


Vital Signs (last 24 hours): 


                                        











Temp Pulse Resp BP Pulse Ox


 


 98.7 F   76   20   165/82 H  96 


 


 04/16/19 07:00  04/16/19 07:00  04/16/19 07:00  04/16/19 11:00  04/16/19 07:00








Intake and Output: 


                                        











 04/16/19 04/16/19





 06:59 18:59


 


Intake Total 240 


 


Output Total 1000 


 


Balance -760 














- Medications


Medications: 


                               Current Medications





Amlodipine Besylate (Norvasc)  2.5 mg PO DAILY [facility]


   Last Admin: 04/16/19 11:00 Dose:  2.5 mg


Enalapril Maleate (Vasotec)  2.5 mg PO DAILY [facility]


   Last Admin: 04/16/19 11:00 Dose:  2.5 mg


Epoetin David (Procrit)  10,000 unit SC TTS [facility]


   Last Admin: 04/16/19 11:00 Dose:  10,000 unit


Gabapentin (Neurontin)  100 mg PO QID [facility]


   Last Admin: 04/16/19 11:00 Dose:  100 mg


Heparin Sodium (Porcine) (Heparin)  5,000 units SC Q12 [facility]


   Last Admin: 04/16/19 11:00 Dose:  5,000 units


Cefepime HCl (Maxipime Iv 1 Gm Premix)  1 gm in 50 mls @ 100 mls/hr IVPB Q12H 

[facility]; Protocol


   Last Admin: 04/16/19 08:19 Dose:  100 mls/hr


Daptomycin 400 mg/ Sodium (Chloride)  100 mls @ 100 mls/hr IV Q24H [facility]; Protocol


   Last Admin: 04/15/19 17:30 Dose:  100 mls/hr


Insulin Glargine (Lantus)  8 unit SC HS [facility]


   Last Admin: 04/15/19 21:05 Dose:  8 units


Insulin Human Regular (Novolin R)  0 unit SC ACHS [facility]; Protocol


   Last Admin: 04/16/19 08:19 Dose:  6 units


Pantoprazole Sodium (Protonix Ec Tab)  40 mg PO DAILY [facility]


   Last Admin: 04/16/19 11:00 Dose:  40 mg


Saccharomyces Boulardii (Florastor)  250 mg PO BID [facility]


   Last Admin: 04/16/19 11:00 Dose:  250 mg


Sertraline HCl (Zoloft)  150 mg PO DAILY [facility]


   Last Admin: 04/16/19 11:00 Dose:  150 mg


Sodium Hypochlorite (Dakins Solution 0.125%)  0 appl TOP DAILY [facility]


   Last Admin: 04/16/19 11:00 Dose:  20 appl











- Labs


Labs: 


                                        





                                 04/16/19 07:06 





                                 04/16/19 07:06 





                                        











PT  12.1 SECONDS (9.7-12.2)   04/08/19  20:59    


 


INR  1.1   04/08/19  20:59    


 


APTT  30 SECONDS (21-34)   04/08/19  20:59    














- Constitutional


Appears: Non-toxic, Chronically Ill





- Head Exam


Head Exam: ATRAUMATIC, NORMAL INSPECTION, NORMOCEPHALIC





- Eye Exam


Eye Exam: EOMI, Normal appearance, PERRL


Pupil Exam: NORMAL ACCOMODATION, PERRL





- ENT Exam


ENT Exam: Mucous Membranes Moist, Normal Exam





- Neck Exam


Neck Exam: Full ROM, Normal Inspection.  absent: Lymphadenopathy





- Respiratory Exam


Respiratory Exam: Clear to Ausculation Bilateral, NORMAL BREATHING PATTERN





- Cardiovascular Exam


Cardiovascular Exam: REGULAR RHYTHM, +S1, +S2.  absent: Murmur





- GI/Abdominal Exam


GI & Abdominal Exam: Soft, Normal Bowel Sounds.  absent: Tenderness





- Rectal Exam


Rectal Exam: Deferred





-  Exam


 Exam: NORMAL INSPECTION





- Extremities Exam


Extremities Exam: Full ROM, Normal Capillary Refill, Normal Inspection.  absent:

Joint Swelling, Pedal Edema





- Back Exam


Back Exam: NORMAL INSPECTION





- Neurological Exam


Neurological Exam: Alert, Awake, CN II-XII Intact, Normal Gait, Oriented x3





- Psychiatric Exam


Psychiatric exam: Normal Affect, Normal Mood





- Skin


Skin Exam: Dry


Additional comments: 





right foot wound same





Assessment and Plan


(1) Osteomyelitis


Status: Acute   





(2) Cellulitis of right leg


Status: Acute   





(3) Chronic renal insufficiency


Status: Acute   





(4) Diabetic ulcer of foot associated with diabetes mellitus due to underlying 

condition, limited to breakdown of skin


Status: Acute   





(5) Leg pain


Status: Acute   





(6) Musculoskeletal pain


Status: Acute   





- Assessment and Plan (Free Text)


Assessment: 





cont cubicin for 6 more weeks

## 2019-04-16 NOTE — CP.PCM.DIS
<Grisel Walter - Last Filed: 04/16/19 16:39>





Provider





- Provider


Date of Admission: 


04/04/19 17:51





Attending physician: 


Liliane Brito MD





Consults: 








04/05/19 12:18


Physician Consult Routine 


   Comment: 


   Consulting Provider: Yoav Trinidad


   Consulting Physician: Yoav Trinidad


   Reason for Consult: OM





04/05/19 15:58


Physician Consult Routine 


   Comment: 


   Consulting Provider: Luan Mendez


   Consulting Physician: Luan Mendez


   Reason for Consult: clearence for foot surgery , debridement/bone excision





04/09/19 15:20


Discharge Planning [Case Management Referral] Routine 


   Comment: as per DR Trinidad


   Physician Instructions: cefepime 1 gm q 12h x 6 weeks for OM


   Reason For Exam: daptomycin 400mg iv dailyx 6 weeks


   Reason for Referral: Discharge Planning











Time Spent in preparation of Discharge (in minutes): 70





Diagnosis





- Discharge Diagnosis


(1) Osteomyelitis


Status: Acute   





(2) Diabetic foot ulcers


Status: Chronic   





(3) Uncontrolled diabetes mellitus


Status: Chronic   





(4) Anemia


Status: Chronic   





Hospital Course





- Lab Results


Lab Results: 


                                  Micro Results





04/09/19 11:11   Foot - Right   Gram Stain - Final


04/09/19 11:11   Foot - Right   Wound Culture - Final


                            Methicillin Resistant S Aureus


                            Enterococcus Faecalis


04/09/19 11:11   Foot - Right   Gram Stain - Final


04/09/19 11:11   Foot - Right   Wound Culture - Final


                            Methicillin Resistant S Aureus


                            Enterococcus Faecalis


04/04/19 15:40   Blood   Blood Culture - Final


                            NO GROWTH AFTER 5 DAYS


04/04/19 15:40   Blood   Gram Stain - Final


                            TEST NOT PERFORMED


04/04/19 15:40   Blood   Blood Culture - Final


                            NO GROWTH AFTER 5 DAYS


04/04/19 15:40   Blood   Gram Stain - Final


                            TEST NOT PERFORMED


04/04/19 18:02   Foot - Right   Gram Stain - Final


04/04/19 18:02   Foot - Right   Wound Culture - Final


                            Enterococcus Faecalis


                            Methicillin Resistant S Aureus





                             Most Recent Lab Values











WBC  6.5 K/uL (4.8-10.8)   04/16/19  07:06    


 


RBC  3.04 Mil/uL (4.40-5.90)  L  04/16/19  07:06    


 


Hgb  9.1 g/dL (12.0-18.0)  L  04/16/19  07:06    


 


Hct  27.1 % (35.0-51.0)  L  04/16/19  07:06    


 


MCV  89.1 fL (80.0-94.0)   04/16/19  07:06    


 


MCH  29.7 pg (27.0-31.0)   04/16/19  07:06    


 


MCHC  33.4 g/dL (33.0-37.0)   04/16/19  07:06    


 


RDW  14.7 % (11.5-14.5)  H  04/16/19  07:06    


 


Plt Count  339 K/uL (130-400)   04/16/19  07:06    


 


MPV  7.3 fL (7.2-11.7)   04/16/19  07:06    


 


Neut % (Auto)  51.0 % (50.0-75.0)   04/16/19  07:06    


 


Lymph % (Auto)  33.4 % (20.0-40.0)   04/16/19  07:06    


 


Mono % (Auto)  5.7 % (0.0-10.0)   04/16/19  07:06    


 


Eos % (Auto)  8.8 % (0.0-4.0)  H  04/16/19  07:06    


 


Baso % (Auto)  1.1 % (0.0-2.0)   04/16/19  07:06    


 


Neut # (Auto)  3.3 K/uL (1.8-7.0)   04/16/19  07:06    


 


Lymph # (Auto)  2.2 K/uL (1.0-4.3)   04/16/19  07:06    


 


Mono # (Auto)  0.4 K/uL (0.0-0.8)   04/16/19  07:06    


 


Eos # (Auto)  0.6 K/uL (0.0-0.7)   04/16/19  07:06    


 


Baso # (Auto)  0.1 K/uL (0.0-0.2)   04/16/19  07:06    


 


ESR  143 mm/hr (0-15)  H  04/04/19  15:15    


 


PT  12.1 SECONDS (9.7-12.2)   04/08/19  20:59    


 


INR  1.1   04/08/19  20:59    


 


APTT  30 SECONDS (21-34)   04/08/19  20:59    


 


pO2  34 mm/Hg (30-55)   04/04/19  18:40    


 


VBG pH  7.38  (7.32-7.43)   04/04/19  18:40    


 


VBG pCO2  36 mmHg (40-60)  L  04/04/19  18:40    


 


VBG HCO3  21.4 mmol/L  04/04/19  18:40    


 


VBG Total CO2  22.4 mmol/L (22-28)   04/04/19  18:40    


 


VBG O2 Sat (Calc)  72.9 % (40-65)  H  04/04/19  18:40    


 


VBG Base Excess  -3.3 mmol/L (0.0-2.0)  L  04/04/19  18:40    


 


VBG Potassium  4.3 mmol/L (3.6-5.2)   04/04/19  18:40    


 


Sodium  139.0 mmol/l (132-148)   04/04/19  18:40    


 


Chloride  109.0 mmol/L ()  H  04/04/19  18:40    


 


Glucose  160 mg/dl ()  H  04/04/19  18:40    


 


Lactate  0.7 mmol/L (0.7-2.1)   04/04/19  18:40    


 


FiO2  21.0 %  04/04/19  18:40    


 


Sodium  135 mmol/L (132-148)   04/16/19  07:06    


 


Potassium  4.3 mmol/L (3.6-5.2)   04/16/19  07:06    


 


Chloride  104 mmol/L ()   04/16/19  07:06    


 


Carbon Dioxide  24 mmol/L (22-30)   04/16/19  07:06    


 


Anion Gap  12  (10-20)   04/16/19  07:06    


 


BUN  32 mg/dL (9-20)  H  04/16/19  07:06    


 


Creatinine  1.9 mg/dL (0.8-1.5)  H  04/16/19  07:06    


 


Est GFR ( Amer)  45   04/16/19  07:06    


 


Est GFR (Non-Af Amer)  37   04/16/19  07:06    


 


POC Glucose (mg/dL)  257 mg/dL ()  H  04/16/19  16:09    


 


Random Glucose  298 mg/dL ()  H  04/16/19  07:06    


 


Hemoglobin A1c  9.5 % (4.2-6.5)  H  04/09/19  10:10    


 


Calcium  8.4 mg/dl (8.6-10.4)  L  04/16/19  07:06    


 


Phosphorus  4.4 mg/dL (2.5-4.5)   04/16/19  07:06    


 


Magnesium  1.6 mg/dL (1.6-2.3)   04/16/19  07:06    


 


Total Bilirubin  0.2 mg/dL (0.2-1.3)   04/16/19  07:06    


 


AST  51 U/L (17-59)   04/16/19  07:06    


 


ALT  48 U/L (21-72)   04/16/19  07:06    


 


Alkaline Phosphatase  83 U/L ()   04/16/19  07:06    


 


Total Creatine Kinase  21 U/L ()  L  04/10/19  06:31    


 


Troponin I  < 0.0120 ng/mL (0.00-0.120)   04/04/19  15:15    


 


NT-Pro-B Natriuret Pep  1980 pg/mL (0-900)  H  04/04/19  15:15    


 


Total Protein  6.0 g/dL (6.3-8.3)  L  04/16/19  07:06    


 


Albumin  3.0 g/dL (3.5-5.0)  L  04/16/19  07:06    


 


Globulin  3.0 gm/dL (2.2-3.9)   04/16/19  07:06    


 


Albumin/Globulin Ratio  1.0  (1.0-2.1)   04/16/19  07:06    


 


Venous Blood Potassium  4.3 mmol/L (3.6-5.2)   04/04/19  18:40    


 


Urine Color  Yellow  (YELLOW)   04/07/19  10:26    


 


Urine Clarity  Clear  (Clear)   04/07/19  10:26    


 


Urine pH  5.0  (5.0-8.0)   04/07/19  10:26    


 


Ur Specific Gravity  1.017  (1.003-1.030)   04/07/19  10:26    


 


Urine Protein  2+ mg/dL (NEGATIVE)  H  04/07/19  10:26    


 


Urine Glucose (UA)  Normal mg/dL (Normal)   04/07/19  10:26    


 


Urine Ketones  Negative mg/dL (NEGATIVE)   04/07/19  10:26    


 


Urine Blood  1+  (NEGATIVE)  H  04/07/19  10:26    


 


Urine Nitrate  Negative  (NEGATIVE)   04/07/19  10:26    


 


Urine Bilirubin  Negative  (NEGATIVE)   04/07/19  10:26    


 


Urine Urobilinogen  Normal mg/dL (0.2-1.0)   04/07/19  10:26    


 


Ur Leukocyte Esterase  Neg Latia/uL (Negative)   04/07/19  10:26    


 


Urine WBC (Auto)  1 /hpf (0-5)   04/07/19  10:26    


 


Urine RBC (Auto)  10 /hpf (0-3)  H  04/07/19  10:26    


 


Vancomycin Trough  19.0 ug/mL (5.0-10.0)  H  04/08/19  20:59    


 


Blood Type  A POSITIVE   04/05/19  17:10    


 


Antibody Screen  Negative   04/05/19  17:10    














- Hospital Course


Hospital Course: 





Upon Admission:


53M PMH DM2 seen and evaluated in ED after being sent in by Dr. Maza for right 

foot ulceration. States that the ulceration on the lateral side of his foot has 

been present for several weeks and has progressively been getting worse in 

appearance. Patient also relates severe, sharp shooting pain from his foot all 

the way up to his groin, especially behind his calf. He states that he has been 

seeing Dr. Maza regularly for wound care. He is AAO at time of visit. He 

appears to be lethargic. He states that he has been vomiting today and is 

experiencing chills. He denies any further pedal complaint





Patient was admitted for osteomyelitis of the RLE.





Hospital Course:


R foot xray showed Lateral 5th metatarsal ulcer with cortical changes of the 5th

metatarsal concerning for osteomyelitis.


Arterial Duplex of RLE showed no hemodynamically significant arterial 

dysfunction.


Venous Duplex of RLE showed no evidence of deep or superficial vein thrombosis. 

normal valve function was noted. normal venous flow noted in common femoral 

vein. 


Venous Duplex of RUE showed normal venous flow and valve function. 


Wound Culture grew Enterococcus and MRSA


ID Dr Trinidad was consulted and recommended Cefepime and Dapto, to be completed 

until May 20th. 


Cardiology was consulted for cardiac clearance and recommended ECHO and stress 

test prior to OR with podiatry. 


ECHO showed normal LV size and function with normal EF. mild MR, TR, and pulm 

HTN.


Stress test showed no ischemic changes and showed normal EF and LV function.


Podiatry Dr Maza was consulted and performed a debridment of the wound. They 

recommended wound vac placement upon discharge. 


Patient remained afebrile and without leukocytosis. 


Patient was deemed stable for discharge with outpatient IV antibiotic treatment.





Upon Discharge:


Patient was deemed stable for discharge and was set up with IV antibiotic 

treatment of daptomycin. Patient was instructed to follow up with PMD and to 

take medications as prescribed. He was advised to return to the ED with any 

worsening of symptoms. Patient understood and agreed. 








Discharge Exam





- Additional Findings


Additional findings: 





- Constitutional


Appears: Non-toxic, No Acute Distress





- Head Exam


Head Exam: ATRAUMATIC, NORMOCEPHALIC





- Eye Exam


Eye Exam: EOMI, Normal appearance.  absent: Scleral icterus


Pupil Exam: NORMAL ACCOMODATION, PERRL





- ENT Exam


ENT Exam: Mucous Membranes Moist, Normal Exam





- Neck Exam


Neck Exam: Normal Inspection.  absent: Lymphadenopathy





- Respiratory Exam


Respiratory Exam: Clear to Ausculation Bilateral.  absent: Rales, Rhonchi, 

Wheezes





- Cardiovascular Exam


Cardiovascular Exam: RRR, +S1, +S2





- GI/Abdominal Exam


GI & Abdominal Exam: Soft.  absent: Firm, Tenderness, Rebound





- Extremities Exam


Additional comments: 





R Lat Foot: wound appears non erythematous, no drainage or bleeding noted, 

wrapped in wound dressings. sensations absent below distal shin to toes. Pt able

to move toes 





Pedal and Tibial Pulses present





- Neurological Exam


Neurological Exam: Alert, Awake, CN II-XII Intact, Oriented x3





- Psychiatric Exam


Psychiatric exam: Normal Affect, Normal Mood





- Skin


Skin Exam: Dry, Normal Color, Warm





Discharge Plan





- Discharge Medications


Prescriptions: 


amLODIPine [Norvasc] 5 mg PO DAILY 30 Days  tab





- Follow Up Plan


Condition: GUARDED


Disposition: HOME/ ROUTINE


Instructions:  Peripherally-Inserted Central Catheter, Osteomyelitis (DC), 

Daptomycin


Additional Instructions: 


continue with daptomycin iv as ordered for 6 weeks via PICC line


PT/OT as tolerated


wound care daily to the foot as advised


follow up with podiatry in 1 week in the office


Referrals: 


Luan Mendez MD [Staff Provider] - 


Liliane Brito MD [Staff Provider] - 


Yoav Trinidad MD [Staff Provider] - 





<Raymond Eduardo SAW - Last Filed: 04/16/19 17:50>





Provider





- Provider


Date of Admission: 


04/04/19 17:51





Attending physician: 


Liliane Brito MD





Consults: 








04/05/19 12:18


Physician Consult Routine 


   Comment: 


   Consulting Provider: Yoav Trinidad


   Consulting Physician: Yoav Trinidad


   Reason for Consult: OM





04/05/19 15:58


Physician Consult Routine 


   Comment: 


   Consulting Provider: Luan Mendez


   Consulting Physician: Luan Mendez


   Reason for Consult: clearence for foot surgery , debridement/bone excision





04/09/19 15:20


Discharge Planning [Case Management Referral] Routine 


   Comment: as per DR Trinidad


   Physician Instructions: cefepime 1 gm q 12h x 6 weeks for OM


   Reason For Exam: daptomycin 400mg iv dailyx 6 weeks


   Reason for Referral: Discharge Planning














Hospital Course





- Lab Results


Lab Results: 


                                  Micro Results





04/09/19 11:11   Foot - Right   Gram Stain - Final


04/09/19 11:11   Foot - Right   Wound Culture - Final


                            Methicillin Resistant S Aureus


                            Enterococcus Faecalis


04/09/19 11:11   Foot - Right   Gram Stain - Final


04/09/19 11:11   Foot - Right   Wound Culture - Final


                            Methicillin Resistant S Aureus


                            Enterococcus Faecalis


04/04/19 15:40   Blood   Blood Culture - Final


                            NO GROWTH AFTER 5 DAYS


04/04/19 15:40   Blood   Gram Stain - Final


                            TEST NOT PERFORMED


04/04/19 15:40   Blood   Blood Culture - Final


                            NO GROWTH AFTER 5 DAYS


04/04/19 15:40   Blood   Gram Stain - Final


                            TEST NOT PERFORMED


04/04/19 18:02   Foot - Right   Gram Stain - Final


04/04/19 18:02   Foot - Right   Wound Culture - Final


                            Enterococcus Faecalis


                            Methicillin Resistant S Aureus





                             Most Recent Lab Values











WBC  6.5 K/uL (4.8-10.8)   04/16/19  07:06    


 


RBC  3.04 Mil/uL (4.40-5.90)  L  04/16/19  07:06    


 


Hgb  9.1 g/dL (12.0-18.0)  L  04/16/19  07:06    


 


Hct  27.1 % (35.0-51.0)  L  04/16/19  07:06    


 


MCV  89.1 fL (80.0-94.0)   04/16/19  07:06    


 


MCH  29.7 pg (27.0-31.0)   04/16/19  07:06    


 


MCHC  33.4 g/dL (33.0-37.0)   04/16/19  07:06    


 


RDW  14.7 % (11.5-14.5)  H  04/16/19  07:06    


 


Plt Count  339 K/uL (130-400)   04/16/19  07:06    


 


MPV  7.3 fL (7.2-11.7)   04/16/19  07:06    


 


Neut % (Auto)  51.0 % (50.0-75.0)   04/16/19  07:06    


 


Lymph % (Auto)  33.4 % (20.0-40.0)   04/16/19  07:06    


 


Mono % (Auto)  5.7 % (0.0-10.0)   04/16/19  07:06    


 


Eos % (Auto)  8.8 % (0.0-4.0)  H  04/16/19  07:06    


 


Baso % (Auto)  1.1 % (0.0-2.0)   04/16/19  07:06    


 


Neut # (Auto)  3.3 K/uL (1.8-7.0)   04/16/19  07:06    


 


Lymph # (Auto)  2.2 K/uL (1.0-4.3)   04/16/19  07:06    


 


Mono # (Auto)  0.4 K/uL (0.0-0.8)   04/16/19  07:06    


 


Eos # (Auto)  0.6 K/uL (0.0-0.7)   04/16/19  07:06    


 


Baso # (Auto)  0.1 K/uL (0.0-0.2)   04/16/19  07:06    


 


ESR  143 mm/hr (0-15)  H  04/04/19  15:15    


 


PT  12.1 SECONDS (9.7-12.2)   04/08/19  20:59    


 


INR  1.1   04/08/19  20:59    


 


APTT  30 SECONDS (21-34)   04/08/19  20:59    


 


pO2  34 mm/Hg (30-55)   04/04/19  18:40    


 


VBG pH  7.38  (7.32-7.43)   04/04/19  18:40    


 


VBG pCO2  36 mmHg (40-60)  L  04/04/19  18:40    


 


VBG HCO3  21.4 mmol/L  04/04/19  18:40    


 


VBG Total CO2  22.4 mmol/L (22-28)   04/04/19  18:40    


 


VBG O2 Sat (Calc)  72.9 % (40-65)  H  04/04/19  18:40    


 


VBG Base Excess  -3.3 mmol/L (0.0-2.0)  L  04/04/19  18:40    


 


VBG Potassium  4.3 mmol/L (3.6-5.2)   04/04/19  18:40    


 


Sodium  139.0 mmol/l (132-148)   04/04/19  18:40    


 


Chloride  109.0 mmol/L ()  H  04/04/19  18:40    


 


Glucose  160 mg/dl ()  H  04/04/19  18:40    


 


Lactate  0.7 mmol/L (0.7-2.1)   04/04/19  18:40    


 


FiO2  21.0 %  04/04/19  18:40    


 


Sodium  135 mmol/L (132-148)   04/16/19  07:06    


 


Potassium  4.3 mmol/L (3.6-5.2)   04/16/19  07:06    


 


Chloride  104 mmol/L ()   04/16/19  07:06    


 


Carbon Dioxide  24 mmol/L (22-30)   04/16/19  07:06    


 


Anion Gap  12  (10-20)   04/16/19  07:06    


 


BUN  32 mg/dL (9-20)  H  04/16/19  07:06    


 


Creatinine  1.9 mg/dL (0.8-1.5)  H  04/16/19  07:06    


 


Est GFR ( Amer)  45   04/16/19  07:06    


 


Est GFR (Non-Af Amer)  37   04/16/19  07:06    


 


POC Glucose (mg/dL)  257 mg/dL ()  H  04/16/19  16:09    


 


Random Glucose  298 mg/dL ()  H  04/16/19  07:06    


 


Hemoglobin A1c  9.5 % (4.2-6.5)  H  04/09/19  10:10    


 


Calcium  8.4 mg/dl (8.6-10.4)  L  04/16/19  07:06    


 


Phosphorus  4.4 mg/dL (2.5-4.5)   04/16/19  07:06    


 


Magnesium  1.6 mg/dL (1.6-2.3)   04/16/19  07:06    


 


Total Bilirubin  0.2 mg/dL (0.2-1.3)   04/16/19  07:06    


 


AST  51 U/L (17-59)   04/16/19  07:06    


 


ALT  48 U/L (21-72)   04/16/19  07:06    


 


Alkaline Phosphatase  83 U/L ()   04/16/19  07:06    


 


Total Creatine Kinase  21 U/L ()  L  04/10/19  06:31    


 


Troponin I  < 0.0120 ng/mL (0.00-0.120)   04/04/19  15:15    


 


NT-Pro-B Natriuret Pep  1980 pg/mL (0-900)  H  04/04/19  15:15    


 


Total Protein  6.0 g/dL (6.3-8.3)  L  04/16/19  07:06    


 


Albumin  3.0 g/dL (3.5-5.0)  L  04/16/19  07:06    


 


Globulin  3.0 gm/dL (2.2-3.9)   04/16/19  07:06    


 


Albumin/Globulin Ratio  1.0  (1.0-2.1)   04/16/19  07:06    


 


Venous Blood Potassium  4.3 mmol/L (3.6-5.2)   04/04/19  18:40    


 


Urine Color  Yellow  (YELLOW)   04/07/19  10:26    


 


Urine Clarity  Clear  (Clear)   04/07/19  10:26    


 


Urine pH  5.0  (5.0-8.0)   04/07/19  10:26    


 


Ur Specific Gravity  1.017  (1.003-1.030)   04/07/19  10:26    


 


Urine Protein  2+ mg/dL (NEGATIVE)  H  04/07/19  10:26    


 


Urine Glucose (UA)  Normal mg/dL (Normal)   04/07/19  10:26    


 


Urine Ketones  Negative mg/dL (NEGATIVE)   04/07/19  10:26    


 


Urine Blood  1+  (NEGATIVE)  H  04/07/19  10:26    


 


Urine Nitrate  Negative  (NEGATIVE)   04/07/19  10:26    


 


Urine Bilirubin  Negative  (NEGATIVE)   04/07/19  10:26    


 


Urine Urobilinogen  Normal mg/dL (0.2-1.0)   04/07/19  10:26    


 


Ur Leukocyte Esterase  Neg Latia/uL (Negative)   04/07/19  10:26    


 


Urine WBC (Auto)  1 /hpf (0-5)   04/07/19  10:26    


 


Urine RBC (Auto)  10 /hpf (0-3)  H  04/07/19  10:26    


 


Vancomycin Trough  19.0 ug/mL (5.0-10.0)  H  04/08/19  20:59    


 


Blood Type  A POSITIVE   04/05/19  17:10    


 


Antibody Screen  Negative   04/05/19  17:10    














Attending/Attestation





- Attestation


I have personally seen and examined this patient.: Yes


I have fully participated in the care of the patient.: Yes


I have reviewed all pertinent clinical information, including history, physical 

exam and plan: Yes


Notes (Text): 





04/16/19 17:47


Medical attending: Patient was seen and examined by me. Agree with the above 

note by the resident, the patient will be going to rehab


and while there will also be getting continued IV abx for the osteomylitis. He 

reported that overall he felt well, denied lower extremity pain however he has 

minimal sensation. The dressing was already changed before we came to see him 

this morning. 





Raymond Eduardo

## 2019-04-16 NOTE — CP.PCM.PN
Subjective





- Date & Time of Evaluation


Date of Evaluation: 04/16/19


Time of Evaluation: 11:05





- Subjective


Subjective: 





Podiatry Progress Note for Dr. Maza





53 year old M seen and evaluated at bedside 7 days s/p debridement of lateral 

right foot wound. Patient is AAO x 3 and NAD, resting comfortably in bed. States

that Dr. Maza saw him this morning. Denies of any pain today. Denies any acute 

overnight events or new pedal complaints. Denies any recent N/V/F/C/CP/SOB/D








Objective





- Vital Signs/Intake and Output


Vital Signs (last 24 hours): 


                                        











Temp Pulse Resp BP Pulse Ox


 


 98.7 F   76   20   165/82 H  96 


 


 04/16/19 07:00  04/16/19 07:00  04/16/19 07:00  04/16/19 07:00  04/16/19 07:00








Intake and Output: 


                                        











 04/16/19 04/16/19





 06:59 18:59


 


Intake Total 240 


 


Output Total 1000 


 


Balance -760 














- Medications


Medications: 


                               Current Medications





Amlodipine Besylate (Norvasc)  2.5 mg PO DAILY [facility]


   Last Admin: 04/15/19 10:38 Dose:  2.5 mg


Enalapril Maleate (Vasotec)  2.5 mg PO DAILY [facility]


   Last Admin: 04/15/19 10:37 Dose:  2.5 mg


Epoetin David (Procrit)  10,000 unit SC TTS [facility]


Gabapentin (Neurontin)  100 mg PO QID [facility]


   Last Admin: 04/15/19 21:02 Dose:  100 mg


Heparin Sodium (Porcine) (Heparin)  5,000 units SC Q12 [facility]


   Last Admin: 04/15/19 21:02 Dose:  5,000 units


Cefepime HCl (Maxipime Iv 1 Gm Premix)  1 gm in 50 mls @ 100 mls/hr IVPB Q12H 

[facility]; Protocol


   Last Admin: 04/16/19 08:19 Dose:  100 mls/hr


Daptomycin 400 mg/ Sodium (Chloride)  100 mls @ 100 mls/hr IV Q24H [facility]; Protocol


   Last Admin: 04/15/19 17:30 Dose:  100 mls/hr


Insulin Glargine (Lantus)  8 unit SC HS [facility]


   Last Admin: 04/15/19 21:05 Dose:  8 units


Insulin Human Regular (Novolin R)  0 unit SC ACHS [facility]; Protocol


   Last Admin: 04/16/19 08:19 Dose:  6 units


Pantoprazole Sodium (Protonix Ec Tab)  40 mg PO DAILY [facility]


   Last Admin: 04/15/19 10:37 Dose:  40 mg


Saccharomyces Boulardii (Florastor)  250 mg PO BID [facility]


   Last Admin: 04/15/19 17:47 Dose:  250 mg


Sertraline HCl (Zoloft)  150 mg PO DAILY [facility]


   Last Admin: 04/15/19 10:37 Dose:  150 mg


Sodium Hypochlorite (Dakins Solution 0.125%)  0 appl TOP DAILY [facility]


   Last Admin: 04/15/19 10:38 Dose:  Not Given











- Labs


Labs: 


                                        





                                 04/16/19 07:06 





                                 04/16/19 07:06 





                                        











PT  12.1 SECONDS (9.7-12.2)   04/08/19  20:59    


 


INR  1.1   04/08/19  20:59    


 


APTT  30 SECONDS (21-34)   04/08/19  20:59    














- Constitutional


Appears: Well, Non-toxic, No Acute Distress





- Extremities Exam


Additional comments: 





Dressing is clean, dry and intact; no strike through noted





- Neurological Exam


Neurological Exam: Alert, Awake, Oriented x3





- Psychiatric Exam


Psychiatric exam: Normal Affect, Normal Mood





Assessment and Plan





- Assessment and Plan (Free Text)


Assessment: 





53M seen 7 days s/p debridement of lateral right foot wound. 


Plan: 





Patient seen and evaluated with Dr. Maza


Afebrile


Continue abx per ID


Intra-operative wound cx: MRSA, E Faecalis


Postoperative xray: Left 5th metatarsal bone changes compatible with both 

osteomyelitis recent biopsy to assess for the latter. Regional ulcer with 

bandaging. Inferred cellulitis here. Correlate clinically


Wound dressed with xeroform, ABD, DSD, ACE





No plan for further surgical intervention at this time


Stable for discharge from podiatry point of view





Upon discharge patient will need wound vac placement at subacute rehab facility


Patient to follow up with Dr. Maza upon discharge

## 2019-04-16 NOTE — CP.PCM.PN
Subjective





- Date & Time of Evaluation


Date of Evaluation: 04/16/19


Time of Evaluation: 16:10





- Subjective


Subjective: 





Pt alert and oriented x3 with no complaints at this time. 





Objective





- Vital Signs/Intake and Output


Vital Signs (last 24 hours): 


                                        











Temp Pulse Resp BP Pulse Ox


 


 98.7 F   76   20   165/82 H  96 


 


 04/16/19 07:00  04/16/19 07:00  04/16/19 07:00  04/16/19 11:00  04/16/19 07:00








Intake and Output: 


                                        











 04/16/19 04/16/19





 06:59 18:59


 


Intake Total 240 


 


Output Total 1000 


 


Balance -760 














- Medications


Medications: 


                               Current Medications





Amlodipine Besylate (Norvasc)  5 mg PO DAILY [facility]


Enalapril Maleate (Vasotec)  2.5 mg PO DAILY [facility]


   Last Admin: 04/16/19 11:00 Dose:  2.5 mg


Epoetin David (Procrit)  10,000 unit SC TTS [facility]


   Last Admin: 04/16/19 11:00 Dose:  10,000 unit


Gabapentin (Neurontin)  100 mg PO QID [facility]


   Last Admin: 04/16/19 14:35 Dose:  100 mg


Heparin Sodium (Porcine) (Heparin)  5,000 units SC Q12 [facility]


   Last Admin: 04/16/19 11:00 Dose:  5,000 units


Cefepime HCl (Maxipime Iv 1 Gm Premix)  1 gm in 50 mls @ 100 mls/hr IVPB Q12H 

[facility]; Protocol


   Last Admin: 04/16/19 08:19 Dose:  100 mls/hr


Daptomycin 400 mg/ Sodium (Chloride)  100 mls @ 100 mls/hr IV Q24H [facility]; Protocol


   Last Admin: 04/15/19 17:30 Dose:  100 mls/hr


Insulin Glargine (Lantus)  15 unit SC HS [facility]


Insulin Human Regular (Novolin R)  0 unit SC ACHS [facility]; Protocol


   Last Admin: 04/16/19 12:32 Dose:  3 units


Pantoprazole Sodium (Protonix Ec Tab)  40 mg PO DAILY [facility]


   Last Admin: 04/16/19 11:00 Dose:  40 mg


Saccharomyces Boulardii (Florastor)  250 mg PO BID [facility]


   Last Admin: 04/16/19 11:00 Dose:  250 mg


Sertraline HCl (Zoloft)  150 mg PO DAILY [facility]


   Last Admin: 04/16/19 11:00 Dose:  150 mg


Sodium Hypochlorite (Dakins Solution 0.125%)  0 appl TOP DAILY [facility]


   Last Admin: 04/16/19 11:00 Dose:  20 appl











- Labs


Labs: 


                                        





                                 04/16/19 07:06 





                                 04/16/19 07:06 





                                        











PT  12.1 SECONDS (9.7-12.2)   04/08/19  20:59    


 


INR  1.1   04/08/19  20:59    


 


APTT  30 SECONDS (21-34)   04/08/19  20:59    














Assessment and Plan





- Assessment and Plan (Free Text)


Assessment: 


53 year old male s/p debridement due to osteomylitis with complication of 

anemia. Pt has no acute symptoms. Pt VS stable. Pt alert and oriented x3. Pt 

being transferred to [facility] in [place] on IV Daptomycin for 6 additions 

weeks. Pt will receiving antibiotics through PICC. Continue with daily wound 

care as advised. Discharge plan discussed with Dr. Brito.

## 2021-02-24 ENCOUNTER — EMERGENCY VISIT (OUTPATIENT)
Dept: URBAN - METROPOLITAN AREA CLINIC 73 | Facility: CLINIC | Age: 56
End: 2021-02-24

## 2021-02-24 DIAGNOSIS — H43.12: ICD-10-CM

## 2021-02-24 DIAGNOSIS — E11.3293: ICD-10-CM

## 2021-02-24 DIAGNOSIS — H43.812: ICD-10-CM

## 2021-02-24 PROCEDURE — 99204 OFFICE O/P NEW MOD 45 MIN: CPT

## 2021-02-24 PROCEDURE — 92201 OPSCPY EXTND RTA DRAW UNI/BI: CPT

## 2021-02-24 PROCEDURE — 92134 CPTRZ OPH DX IMG PST SGM RTA: CPT

## 2021-02-24 ASSESSMENT — TONOMETRY
OS_IOP_MMHG: 12
OD_IOP_MMHG: 13

## 2021-02-24 ASSESSMENT — VISUAL ACUITY
OS_CC: 20/25-1
OD_CC: 20/25-2

## 2021-03-17 ENCOUNTER — FOLLOW UP (OUTPATIENT)
Dept: URBAN - METROPOLITAN AREA CLINIC 73 | Facility: CLINIC | Age: 56
End: 2021-03-17

## 2021-03-17 DIAGNOSIS — H35.433: ICD-10-CM

## 2021-03-17 DIAGNOSIS — H43.12: ICD-10-CM

## 2021-03-17 DIAGNOSIS — E11.3293: ICD-10-CM

## 2021-03-17 DIAGNOSIS — H43.812: ICD-10-CM

## 2021-03-17 PROCEDURE — 92134 CPTRZ OPH DX IMG PST SGM RTA: CPT

## 2021-03-17 PROCEDURE — 92235 FLUORESCEIN ANGRPH MLTIFRAME: CPT

## 2021-03-17 PROCEDURE — 92250 FUNDUS PHOTOGRAPHY W/I&R: CPT

## 2021-03-17 PROCEDURE — 92201 OPSCPY EXTND RTA DRAW UNI/BI: CPT

## 2021-03-17 PROCEDURE — 92014 COMPRE OPH EXAM EST PT 1/>: CPT

## 2021-03-17 ASSESSMENT — VISUAL ACUITY
OD_CC: 20/30-3
OS_CC: 20/20

## 2021-03-17 ASSESSMENT — TONOMETRY
OS_IOP_MMHG: 11
OD_IOP_MMHG: 11

## 2021-04-07 ENCOUNTER — FOLLOW UP (OUTPATIENT)
Dept: URBAN - METROPOLITAN AREA CLINIC 73 | Facility: CLINIC | Age: 56
End: 2021-04-07

## 2021-04-07 VITALS — HEIGHT: 60 IN

## 2021-04-07 DIAGNOSIS — E11.3293: ICD-10-CM

## 2021-04-07 DIAGNOSIS — H25.13: ICD-10-CM

## 2021-04-07 DIAGNOSIS — H43.813: ICD-10-CM

## 2021-04-07 DIAGNOSIS — H43.12: ICD-10-CM

## 2021-04-07 DIAGNOSIS — H35.433: ICD-10-CM

## 2021-04-07 PROCEDURE — 92134 CPTRZ OPH DX IMG PST SGM RTA: CPT

## 2021-04-07 PROCEDURE — 92014 COMPRE OPH EXAM EST PT 1/>: CPT

## 2021-04-07 PROCEDURE — 92201 OPSCPY EXTND RTA DRAW UNI/BI: CPT

## 2021-04-07 ASSESSMENT — VISUAL ACUITY
OD_CC: 20/25
OS_CC: 20/20

## 2021-04-07 ASSESSMENT — TONOMETRY
OD_IOP_MMHG: 9
OS_IOP_MMHG: 9

## 2021-06-04 ENCOUNTER — FOLLOW UP (OUTPATIENT)
Dept: URBAN - METROPOLITAN AREA CLINIC 73 | Facility: CLINIC | Age: 56
End: 2021-06-04

## 2021-06-04 VITALS — HEIGHT: 60 IN

## 2021-06-04 DIAGNOSIS — E11.3293: ICD-10-CM

## 2021-06-04 DIAGNOSIS — H43.813: ICD-10-CM

## 2021-06-04 DIAGNOSIS — H43.12: ICD-10-CM

## 2021-06-04 PROCEDURE — 92201 OPSCPY EXTND RTA DRAW UNI/BI: CPT

## 2021-06-04 PROCEDURE — 92014 COMPRE OPH EXAM EST PT 1/>: CPT

## 2021-06-04 PROCEDURE — 92134 CPTRZ OPH DX IMG PST SGM RTA: CPT

## 2021-06-04 ASSESSMENT — VISUAL ACUITY
OS_CC: 20/16-1
OD_CC: 20/25-1
OD_PH: 20/20-1

## 2021-06-04 ASSESSMENT — TONOMETRY
OD_IOP_MMHG: 11
OS_IOP_MMHG: 10

## 2021-09-10 ENCOUNTER — FOLLOW UP (OUTPATIENT)
Dept: URBAN - METROPOLITAN AREA CLINIC 73 | Facility: CLINIC | Age: 56
End: 2021-09-10

## 2021-09-10 DIAGNOSIS — E11.3293: ICD-10-CM

## 2021-09-10 DIAGNOSIS — H43.813: ICD-10-CM

## 2021-09-10 PROCEDURE — 92014 COMPRE OPH EXAM EST PT 1/>: CPT

## 2021-09-10 PROCEDURE — 92134 CPTRZ OPH DX IMG PST SGM RTA: CPT

## 2021-09-10 PROCEDURE — 92202 OPSCPY EXTND ON/MAC DRAW: CPT

## 2021-09-10 ASSESSMENT — TONOMETRY
OS_IOP_MMHG: 11
OD_IOP_MMHG: 11

## 2021-09-10 ASSESSMENT — VISUAL ACUITY
OD_CC: 20/25-2
OS_CC: 20/20

## 2022-01-19 ENCOUNTER — FOLLOW UP (OUTPATIENT)
Dept: URBAN - METROPOLITAN AREA CLINIC 73 | Facility: CLINIC | Age: 57
End: 2022-01-19

## 2022-01-19 DIAGNOSIS — H43.813: ICD-10-CM

## 2022-01-19 DIAGNOSIS — H35.433: ICD-10-CM

## 2022-01-19 DIAGNOSIS — E11.3293: ICD-10-CM

## 2022-01-19 PROCEDURE — 92014 COMPRE OPH EXAM EST PT 1/>: CPT

## 2022-01-19 PROCEDURE — 92134 CPTRZ OPH DX IMG PST SGM RTA: CPT

## 2022-01-19 PROCEDURE — 92202 OPSCPY EXTND ON/MAC DRAW: CPT

## 2022-01-19 ASSESSMENT — TONOMETRY
OS_IOP_MMHG: 16
OD_IOP_MMHG: 13

## 2022-01-19 ASSESSMENT — VISUAL ACUITY
OD_PH: 20/25-1
OS_CC: 20/20-1
OD_CC: 20/40+2

## 2022-05-18 ENCOUNTER — FOLLOW UP (OUTPATIENT)
Dept: URBAN - METROPOLITAN AREA CLINIC 73 | Facility: CLINIC | Age: 57
End: 2022-05-18

## 2022-05-18 DIAGNOSIS — E11.3293: ICD-10-CM

## 2022-05-18 DIAGNOSIS — H43.813: ICD-10-CM

## 2022-05-18 PROCEDURE — 92134 CPTRZ OPH DX IMG PST SGM RTA: CPT

## 2022-05-18 PROCEDURE — 92202 OPSCPY EXTND ON/MAC DRAW: CPT

## 2022-05-18 PROCEDURE — 92014 COMPRE OPH EXAM EST PT 1/>: CPT

## 2022-05-18 ASSESSMENT — VISUAL ACUITY
OD_CC: 20/40-1
OS_CC: 20/20+3

## 2022-05-18 ASSESSMENT — TONOMETRY
OD_IOP_MMHG: 15
OS_IOP_MMHG: 16

## 2022-10-17 NOTE — CP.PCM.PN
Subjective





- Date & Time of Evaluation


Date of Evaluation: 04/15/19


Time of Evaluation: 09:00





- Subjective


Subjective: 





wound healing


no fever








Objective





- Vital Signs/Intake and Output


Vital Signs (last 24 hours): 


                                        











Temp Pulse Resp BP Pulse Ox


 


 97.9 F   79   20   155/82 H  98 


 


 04/15/19 08:14  04/15/19 08:14  04/15/19 08:14  04/15/19 10:37  04/15/19 08:14











- Medications


Medications: 


                               Current Medications





Acetaminophen/Codeine Phosphate (Tylenol/Codeine 300 Mg/30 Mg)  1 ea PO Q4 PRN


   PRN Reason: Pain, Mild (1-3)


   Last Admin: 04/14/19 22:28 Dose:  1 ea


Amlodipine Besylate (Norvasc)  2.5 mg PO DAILY [facility]


   Last Admin: 04/15/19 10:38 Dose:  2.5 mg


Enalapril Maleate (Vasotec)  2.5 mg PO DAILY [facility]


   Last Admin: 04/15/19 10:37 Dose:  2.5 mg


Epoetin David (Procrit)  10,000 unit SC TTS [facility]


Gabapentin (Neurontin)  100 mg PO QID [facility]


   Last Admin: 04/15/19 10:37 Dose:  100 mg


Heparin Sodium (Porcine) (Heparin)  5,000 units SC Q12 [facility]


   Last Admin: 04/15/19 10:38 Dose:  5,000 units


Cefepime HCl (Maxipime Iv 1 Gm Premix)  1 gm in 50 mls @ 100 mls/hr IVPB Q12H 

[facility]; Protocol


   Last Admin: 04/15/19 08:23 Dose:  100 mls/hr


Insulin Glargine (Lantus)  8 unit SC HS [facility]


   Last Admin: 04/14/19 21:06 Dose:  8 units


Insulin Human Regular (Novolin R)  0 unit SC ACHS [facility]; Protocol


   Last Admin: 04/15/19 08:24 Dose:  4 units


Pantoprazole Sodium (Protonix Ec Tab)  40 mg PO DAILY [facility]


   Last Admin: 04/15/19 10:37 Dose:  40 mg


Saccharomyces Boulardii (Florastor)  250 mg PO BID [facility]


   Last Admin: 04/15/19 10:38 Dose:  250 mg


Sertraline HCl (Zoloft)  150 mg PO DAILY [facility]


   Last Admin: 04/15/19 10:37 Dose:  150 mg


Sodium Hypochlorite (Dakins Solution 0.125%)  0 appl TOP DAILY [facility]


   Last Admin: 04/15/19 10:38 Dose:  Not Given











- Labs


Labs: 


                                        





                                 04/15/19 06:29 





                                 04/15/19 06:29 





                                        











PT  12.1 SECONDS (9.7-12.2)   04/08/19  20:59    


 


INR  1.1   04/08/19  20:59    


 


APTT  30 SECONDS (21-34)   04/08/19  20:59    














- Constitutional


Appears: Non-toxic





- Head Exam


Head Exam: ATRAUMATIC, NORMAL INSPECTION, NORMOCEPHALIC





- Eye Exam


Eye Exam: EOMI, Normal appearance, PERRL


Pupil Exam: NORMAL ACCOMODATION, PERRL





- ENT Exam


ENT Exam: Mucous Membranes Moist, Normal Exam





- Neck Exam


Neck Exam: Full ROM, Normal Inspection.  absent: Lymphadenopathy





- Respiratory Exam


Respiratory Exam: Clear to Ausculation Bilateral, NORMAL BREATHING PATTERN





- Cardiovascular Exam


Cardiovascular Exam: REGULAR RHYTHM, +S1, +S2.  absent: Murmur





- GI/Abdominal Exam


GI & Abdominal Exam: Soft, Normal Bowel Sounds.  absent: Tenderness





- Rectal Exam


Rectal Exam: Deferred





-  Exam


 Exam: NORMAL INSPECTION





- Extremities Exam


Extremities Exam: Full ROM, Normal Capillary Refill, Normal Inspection.  absent:

Joint Swelling, Pedal Edema





- Back Exam


Back Exam: NORMAL INSPECTION





- Neurological Exam


Neurological Exam: Alert, Awake, CN II-XII Intact, Normal Gait, Oriented x3





- Psychiatric Exam


Psychiatric exam: Normal Affect, Normal Mood





- Skin


Skin Exam: Dry, Normal Color


Additional comments: 





wound right foot slowly healing








Assessment and Plan


(1) Osteomyelitis


Status: Acute   





(2) Cellulitis of right leg


Status: Acute   





(3) Chronic renal insufficiency


Status: Acute   





(4) Diabetic ulcer of foot associated with diabetes mellitus due to underlying 

condition, limited to breakdown of skin


Status: Acute   





(5) Leg pain


Status: Acute   





(6) Musculoskeletal pain


Status: Acute   





- Assessment and Plan (Free Text)


Assessment: 





cont IV Cubicin for 6-8 weeks Prescriptions electronically submitted to pharmacy from [facility]

## 2022-11-16 ENCOUNTER — FOLLOW UP (OUTPATIENT)
Dept: URBAN - METROPOLITAN AREA CLINIC 73 | Facility: CLINIC | Age: 57
End: 2022-11-16

## 2022-11-16 DIAGNOSIS — H43.813: ICD-10-CM

## 2022-11-16 DIAGNOSIS — E11.3293: ICD-10-CM

## 2022-11-16 PROCEDURE — 92014 COMPRE OPH EXAM EST PT 1/>: CPT

## 2022-11-16 PROCEDURE — 92134 CPTRZ OPH DX IMG PST SGM RTA: CPT

## 2022-11-16 PROCEDURE — 92202 OPSCPY EXTND ON/MAC DRAW: CPT

## 2022-11-16 ASSESSMENT — VISUAL ACUITY
OS_PH: 20/20-3
OD_CC: 20/40-1
OS_CC: 20/30-3
OD_PH: 20/25-1

## 2022-11-16 ASSESSMENT — TONOMETRY
OS_IOP_MMHG: 15
OD_IOP_MMHG: 15

## 2024-02-12 ENCOUNTER — CONSULT EP (OUTPATIENT)
Dept: URBAN - METROPOLITAN AREA CLINIC 73 | Facility: CLINIC | Age: 59
End: 2024-02-12

## 2024-02-12 DIAGNOSIS — E11.3293: ICD-10-CM

## 2024-02-12 DIAGNOSIS — H43.813: ICD-10-CM

## 2024-02-12 PROCEDURE — 92014 COMPRE OPH EXAM EST PT 1/>: CPT

## 2024-02-12 PROCEDURE — 92202 OPSCPY EXTND ON/MAC DRAW: CPT

## 2024-02-12 PROCEDURE — 92134 CPTRZ OPH DX IMG PST SGM RTA: CPT

## 2024-02-12 ASSESSMENT — VISUAL ACUITY
OD_CC: 20/40+1
OS_CC: 20/20-1

## 2024-02-12 ASSESSMENT — TONOMETRY
OS_IOP_MMHG: 12
OD_IOP_MMHG: 12